# Patient Record
Sex: FEMALE | Race: WHITE | Employment: OTHER | URBAN - METROPOLITAN AREA
[De-identification: names, ages, dates, MRNs, and addresses within clinical notes are randomized per-mention and may not be internally consistent; named-entity substitution may affect disease eponyms.]

---

## 2017-02-13 PROBLEM — N63.10 BREAST MASS, RIGHT: Status: ACTIVE | Noted: 2017-02-13

## 2018-04-17 DIAGNOSIS — Z12.31 VISIT FOR SCREENING MAMMOGRAM: Primary | ICD-10-CM

## 2018-04-30 ENCOUNTER — HOSPITAL ENCOUNTER (OUTPATIENT)
Dept: GENERAL RADIOLOGY | Age: 73
Discharge: HOME | End: 2018-05-02
Payer: MEDICARE

## 2018-04-30 DIAGNOSIS — Z12.31 VISIT FOR SCREENING MAMMOGRAM: ICD-10-CM

## 2018-04-30 PROCEDURE — 77067 SCR MAMMO BI INCL CAD: CPT

## 2019-04-26 LAB
ALBUMIN SERPL-MCNC: NORMAL G/DL
ALP BLD-CCNC: NORMAL U/L
ALT SERPL-CCNC: NORMAL U/L
ANION GAP SERPL CALCULATED.3IONS-SCNC: NORMAL MMOL/L
AST SERPL-CCNC: NORMAL U/L
AVERAGE GLUCOSE: NORMAL
BILIRUB SERPL-MCNC: NORMAL MG/DL
BUN BLDV-MCNC: NORMAL MG/DL
CALCIUM SERPL-MCNC: NORMAL MG/DL
CHLORIDE BLD-SCNC: NORMAL MMOL/L
CHOLESTEROL, TOTAL: NORMAL
CHOLESTEROL/HDL RATIO: NORMAL
CO2: NORMAL
CREAT SERPL-MCNC: NORMAL MG/DL
GFR CALCULATED: NORMAL
GLUCOSE BLD-MCNC: NORMAL MG/DL
HBA1C MFR BLD: 7.6 %
HDLC SERPL-MCNC: NORMAL MG/DL
LDL CHOLESTEROL CALCULATED: NORMAL
POTASSIUM SERPL-SCNC: NORMAL MMOL/L
SODIUM BLD-SCNC: NORMAL MMOL/L
TOTAL PROTEIN: NORMAL
TRIGL SERPL-MCNC: NORMAL MG/DL
VLDLC SERPL CALC-MCNC: NORMAL MG/DL

## 2019-05-29 ENCOUNTER — TELEPHONE (OUTPATIENT)
Dept: FAMILY MEDICINE CLINIC | Age: 74
End: 2019-05-29

## 2019-05-29 ENCOUNTER — TELEPHONE (OUTPATIENT)
Dept: BREAST CENTER | Age: 74
End: 2019-05-29

## 2019-05-29 DIAGNOSIS — M53.86 SCIATICA ASSOCIATED WITH DISORDER OF LUMBAR SPINE: Primary | ICD-10-CM

## 2019-05-29 DIAGNOSIS — Z12.31 VISIT FOR SCREENING MAMMOGRAM: Primary | ICD-10-CM

## 2019-05-29 NOTE — TELEPHONE ENCOUNTER
Patient called to schedule yearly mammogram.  Patient has not seen Dr. Julissa Lamar for 2 years, will send script up for mammogram this year, but encouraged to call PCP for screening mammogram script starting next year. Patient verbalized understanding.

## 2019-05-29 NOTE — TELEPHONE ENCOUNTER
Pt called asking for order for MRI of her back. She would like order sent to [facility]. She said the pain has gotten much worse    Order in EMR. Can you make sure is set up?

## 2019-05-31 NOTE — TELEPHONE ENCOUNTER
Allyson Mohan, would you mind checking to see if central scheduling has set up patients MRI yet? Thank you!

## 2019-06-04 ENCOUNTER — HOSPITAL ENCOUNTER (OUTPATIENT)
Dept: MRI IMAGING | Age: 74
Discharge: HOME | End: 2019-06-06
Payer: MEDICARE

## 2019-06-04 DIAGNOSIS — M53.86 SCIATICA ASSOCIATED WITH DISORDER OF LUMBAR SPINE: ICD-10-CM

## 2019-06-04 PROCEDURE — 6360000004 HC RX CONTRAST MEDICATION: Performed by: RADIOLOGY

## 2019-06-04 PROCEDURE — A9579 GAD-BASE MR CONTRAST NOS,1ML: HCPCS | Performed by: RADIOLOGY

## 2019-06-04 PROCEDURE — 72158 MRI LUMBAR SPINE W/O & W/DYE: CPT

## 2019-06-04 RX ADMIN — GADOTERIDOL 14 ML: 279.3 INJECTION, SOLUTION INTRAVENOUS at 09:57

## 2019-06-07 ENCOUNTER — HOSPITAL ENCOUNTER (OUTPATIENT)
Dept: GENERAL RADIOLOGY | Age: 74
Discharge: HOME | End: 2019-06-09
Payer: MEDICARE

## 2019-06-07 DIAGNOSIS — Z12.31 VISIT FOR SCREENING MAMMOGRAM: ICD-10-CM

## 2019-06-07 PROCEDURE — 77063 BREAST TOMOSYNTHESIS BI: CPT

## 2019-06-07 RX ORDER — ASCORBIC ACID 500 MG
500 TABLET ORAL DAILY
COMMUNITY

## 2019-06-07 RX ORDER — TRAMADOL HYDROCHLORIDE 50 MG/1
50 TABLET ORAL EVERY 12 HOURS
COMMUNITY
End: 2019-09-03 | Stop reason: SDUPTHER

## 2019-06-07 RX ORDER — IBUPROFEN 800 MG/1
800 TABLET ORAL DAILY PRN
COMMUNITY
End: 2020-02-21 | Stop reason: SDUPTHER

## 2019-06-08 PROBLEM — M79.609 PAIN IN LIMB: Status: ACTIVE | Noted: 2017-09-14

## 2019-06-08 PROBLEM — I73.9 PERIPHERAL VASCULAR DISEASE (HCC): Status: ACTIVE | Noted: 2017-09-14

## 2019-06-08 PROBLEM — E11.51 PERIPHERAL VASCULAR DISORDER DUE TO DIABETES MELLITUS (HCC): Status: ACTIVE | Noted: 2017-09-14

## 2019-06-08 PROBLEM — B35.1 ONYCHOMYCOSIS: Status: ACTIVE | Noted: 2017-09-14

## 2019-06-08 PROBLEM — R26.2 DISABILITY OF WALKING: Status: ACTIVE | Noted: 2017-09-14

## 2019-06-10 ENCOUNTER — TELEPHONE (OUTPATIENT)
Dept: ONCOLOGY | Age: 74
End: 2019-06-10

## 2019-06-10 ENCOUNTER — OFFICE VISIT (OUTPATIENT)
Dept: FAMILY MEDICINE CLINIC | Age: 74
End: 2019-06-10
Payer: MEDICARE

## 2019-06-10 VITALS
WEIGHT: 155.2 LBS | HEART RATE: 77 BPM | OXYGEN SATURATION: 96 % | DIASTOLIC BLOOD PRESSURE: 64 MMHG | BODY MASS INDEX: 28.56 KG/M2 | HEIGHT: 62 IN | SYSTOLIC BLOOD PRESSURE: 124 MMHG

## 2019-06-10 DIAGNOSIS — M51.16 LUMBAR DISC DISEASE WITH RADICULOPATHY: ICD-10-CM

## 2019-06-10 DIAGNOSIS — R92.8 MAMMOGRAM ABNORMAL: ICD-10-CM

## 2019-06-10 PROCEDURE — 99213 OFFICE O/P EST LOW 20 MIN: CPT | Performed by: FAMILY MEDICINE

## 2019-06-10 ASSESSMENT — PATIENT HEALTH QUESTIONNAIRE - PHQ9
SUM OF ALL RESPONSES TO PHQ QUESTIONS 1-9: 0
SUM OF ALL RESPONSES TO PHQ QUESTIONS 1-9: 0
2. FEELING DOWN, DEPRESSED OR HOPELESS: 0
1. LITTLE INTEREST OR PLEASURE IN DOING THINGS: 0
SUM OF ALL RESPONSES TO PHQ9 QUESTIONS 1 & 2: 0

## 2019-06-10 NOTE — PROGRESS NOTES
OFFICE NOTE    6/10/19  Name: Genia Santana  :1945   Sex:female   Age:73 y.o. SUBJECTIVE  Chief Complaint   Patient presents with    Results     discuss MRi results        HPI  Came in to discuss recent MRI and plan going forward. Also had abnormal mammogram report form [facility]      Review of Systems   Sciatica LLQ for more than a mos. No lumps, galactorrhea, or pain in breast (s). Current Outpatient Medications:     ibuprofen (ADVIL;MOTRIN) 800 MG tablet, Take 800 mg by mouth daily as needed for Pain, Disp: , Rfl:     traMADol (ULTRAM) 50 MG tablet, Take 50 mg by mouth every 12 hours. , Disp: , Rfl:     vitamin E 600 units capsule, Take 600 Units by mouth daily, Disp: , Rfl:     vitamin C (ASCORBIC ACID) 500 MG tablet, Take 500 mg by mouth daily, Disp: , Rfl:     atorvastatin (LIPITOR) 20 MG tablet, Take 20 mg by mouth daily, Disp: , Rfl:     Multiple Vitamins-Minerals (MULTIVITAMIN PO), Take by mouth, Disp: , Rfl:     Biotin 5000 MCG TABS, Take by mouth, Disp: , Rfl:     Omega-3 Fatty Acids (OMEGA 3 PO), Take by mouth, Disp: , Rfl:     metFORMIN (GLUCOPHAGE) 500 MG tablet, Take 500 mg by mouth 2 times daily (with meals). , Disp: , Rfl:     GLIPIZIDE PO, Take 4 mg by mouth 2 times daily (before meals) , Disp: , Rfl:     pioglitazone (ACTOS) 45 MG tablet, Take 45 mg by mouth daily. , Disp: , Rfl:     enalapril (VASOTEC) 20 MG tablet, Take 20 mg by mouth 2 times daily. , Disp: , Rfl:   No Known Allergies    Past Medical History:   Diagnosis Date    Chronic back pain     Corns and callosities     Diabetes mellitus ([facility] [address]     Hyperlipidemia     Hypertension     Onychomycosis     Peripheral vascular disease (HCC)     Sciatica     Type 2 diabetes mellitus without complication (HCC)      Past Surgical History:   Procedure Laterality Date    BACK SURGERY      BACK SURGERY      3 back surgerys 3-4 year ago    HYSTERECTOMY      TONSILLECTOMY       No family history on file.   Social about 6 months (around 12/10/2019).     Electronically signed by Johnnie Delatorre MD on 6/10/19 at 10:16 AM

## 2019-06-10 NOTE — TELEPHONE ENCOUNTER
Call to patient in reference to her mammogram performed at [facility] on June 7, 2019. Instructed patient that the radiologist has recommended some additional breast imaging, in order to make a final determination/result. Informed her that a request for Rx has been faxed to the ordering physician. Once we receive the script a  from [facility] will contact her to schedule the additional imaging study/studies. Verbalizes understanding and is agreeable to proceed.        Los Pearce RN, BSN, [address]

## 2019-06-13 ENCOUNTER — HOSPITAL ENCOUNTER (OUTPATIENT)
Dept: GENERAL RADIOLOGY | Age: 74
Discharge: HOME | End: 2019-06-15
Payer: MEDICARE

## 2019-06-13 DIAGNOSIS — R92.8 MAMMOGRAM ABNORMAL: ICD-10-CM

## 2019-06-13 PROCEDURE — G0279 TOMOSYNTHESIS, MAMMO: HCPCS

## 2019-06-13 PROCEDURE — 76642 ULTRASOUND BREAST LIMITED: CPT

## 2019-07-02 ENCOUNTER — PROCEDURE VISIT (OUTPATIENT)
Dept: PODIATRY | Age: 74
End: 2019-07-02
Payer: MEDICARE

## 2019-07-02 VITALS — BODY MASS INDEX: 28.89 KG/M2 | HEIGHT: 62 IN | WEIGHT: 157 LBS

## 2019-07-02 DIAGNOSIS — E11.51 TYPE II DIABETES MELLITUS WITH PERIPHERAL CIRCULATORY DISORDER (HCC): ICD-10-CM

## 2019-07-02 DIAGNOSIS — M79.674 PAIN OF TOE OF RIGHT FOOT: ICD-10-CM

## 2019-07-02 DIAGNOSIS — B35.1 ONYCHOMYCOSIS: Primary | ICD-10-CM

## 2019-07-02 DIAGNOSIS — M79.675 PAIN OF TOE OF LEFT FOOT: ICD-10-CM

## 2019-07-02 DIAGNOSIS — I73.9 PVD (PERIPHERAL VASCULAR DISEASE) (HCC): ICD-10-CM

## 2019-07-02 PROCEDURE — 11721 DEBRIDE NAIL 6 OR MORE: CPT | Performed by: PODIATRIST

## 2019-07-30 DIAGNOSIS — E11.9 TYPE 2 DIABETES MELLITUS WITHOUT COMPLICATION, WITHOUT LONG-TERM CURRENT USE OF INSULIN (HCC): Primary | ICD-10-CM

## 2019-07-30 DIAGNOSIS — M15.9 PRIMARY OSTEOARTHRITIS INVOLVING MULTIPLE JOINTS: ICD-10-CM

## 2019-08-26 ASSESSMENT — ENCOUNTER SYMPTOMS
CONSTIPATION: 0
EYES NEGATIVE: 1
ABDOMINAL PAIN: 0
CHEST TIGHTNESS: 0
COUGH: 0
VOMITING: 0
BLOOD IN STOOL: 0
WHEEZING: 0
DIARRHEA: 0

## 2019-09-03 ENCOUNTER — OFFICE VISIT (OUTPATIENT)
Dept: FAMILY MEDICINE CLINIC | Age: 74
End: 2019-09-03
Payer: MEDICARE

## 2019-09-03 VITALS
HEIGHT: 62 IN | SYSTOLIC BLOOD PRESSURE: 136 MMHG | BODY MASS INDEX: 27.53 KG/M2 | WEIGHT: 149.6 LBS | OXYGEN SATURATION: 98 % | HEART RATE: 79 BPM | DIASTOLIC BLOOD PRESSURE: 70 MMHG

## 2019-09-03 DIAGNOSIS — E11.51 TYPE II DIABETES MELLITUS WITH PERIPHERAL CIRCULATORY DISORDER (HCC): ICD-10-CM

## 2019-09-03 DIAGNOSIS — M53.86 SCIATICA ASSOCIATED WITH DISORDER OF LUMBAR SPINE: ICD-10-CM

## 2019-09-03 DIAGNOSIS — E78.49 OTHER HYPERLIPIDEMIA: ICD-10-CM

## 2019-09-03 DIAGNOSIS — I10 ESSENTIAL HYPERTENSION: ICD-10-CM

## 2019-09-03 DIAGNOSIS — I73.9 PERIPHERAL VASCULAR DISEASE (HCC): ICD-10-CM

## 2019-09-03 DIAGNOSIS — M15.9 PRIMARY OSTEOARTHRITIS INVOLVING MULTIPLE JOINTS: ICD-10-CM

## 2019-09-03 DIAGNOSIS — E11.9 TYPE 2 DIABETES MELLITUS WITHOUT COMPLICATION, WITHOUT LONG-TERM CURRENT USE OF INSULIN (HCC): Primary | ICD-10-CM

## 2019-09-03 DIAGNOSIS — M51.16 LUMBAR DISC DISEASE WITH RADICULOPATHY: ICD-10-CM

## 2019-09-03 PROBLEM — E78.5 HYPERLIPIDEMIA: Status: ACTIVE | Noted: 2019-09-03

## 2019-09-03 LAB — HBA1C MFR BLD: 8 %

## 2019-09-03 PROCEDURE — 99214 OFFICE O/P EST MOD 30 MIN: CPT | Performed by: FAMILY MEDICINE

## 2019-09-03 PROCEDURE — 83036 HEMOGLOBIN GLYCOSYLATED A1C: CPT | Performed by: FAMILY MEDICINE

## 2019-09-03 RX ORDER — GLIPIZIDE 5 MG/1
2 TABLET ORAL 2 TIMES DAILY
COMMUNITY

## 2019-09-03 RX ORDER — TRAMADOL HYDROCHLORIDE 50 MG/1
50 TABLET ORAL EVERY 12 HOURS
Qty: 30 TABLET | Refills: 2 | Status: SHIPPED | OUTPATIENT
Start: 2019-09-03 | End: 2019-12-02

## 2019-10-01 ENCOUNTER — PROCEDURE VISIT (OUTPATIENT)
Dept: PODIATRY | Age: 74
End: 2019-10-01
Payer: MEDICARE

## 2019-10-01 VITALS — RESPIRATION RATE: 20 BRPM | WEIGHT: 149 LBS | BODY MASS INDEX: 27.42 KG/M2 | HEIGHT: 62 IN

## 2019-10-01 DIAGNOSIS — M79.675 PAIN OF TOE OF LEFT FOOT: ICD-10-CM

## 2019-10-01 DIAGNOSIS — B35.1 ONYCHOMYCOSIS: Primary | ICD-10-CM

## 2019-10-01 DIAGNOSIS — E11.51 TYPE II DIABETES MELLITUS WITH PERIPHERAL CIRCULATORY DISORDER (HCC): ICD-10-CM

## 2019-10-01 DIAGNOSIS — I73.9 PVD (PERIPHERAL VASCULAR DISEASE) (HCC): ICD-10-CM

## 2019-10-01 DIAGNOSIS — M79.674 PAIN OF TOE OF RIGHT FOOT: ICD-10-CM

## 2019-10-01 PROCEDURE — 11721 DEBRIDE NAIL 6 OR MORE: CPT | Performed by: PODIATRIST

## 2019-11-19 LAB — DIABETIC RETINOPATHY: NEGATIVE

## 2019-12-03 ENCOUNTER — OFFICE VISIT (OUTPATIENT)
Dept: FAMILY MEDICINE CLINIC | Age: 74
End: 2019-12-03
Payer: MEDICARE

## 2019-12-03 VITALS
BODY MASS INDEX: 26.35 KG/M2 | SYSTOLIC BLOOD PRESSURE: 132 MMHG | TEMPERATURE: 97.6 F | DIASTOLIC BLOOD PRESSURE: 70 MMHG | HEART RATE: 75 BPM | WEIGHT: 143.2 LBS | OXYGEN SATURATION: 97 % | HEIGHT: 62 IN

## 2019-12-03 DIAGNOSIS — Z23 IMMUNIZATION DUE: Primary | ICD-10-CM

## 2019-12-03 DIAGNOSIS — E11.59 TYPE 2 DIABETES MELLITUS WITH OTHER CIRCULATORY COMPLICATION, WITHOUT LONG-TERM CURRENT USE OF INSULIN (HCC): ICD-10-CM

## 2019-12-03 DIAGNOSIS — E78.49 OTHER HYPERLIPIDEMIA: ICD-10-CM

## 2019-12-03 DIAGNOSIS — I10 ESSENTIAL HYPERTENSION: ICD-10-CM

## 2019-12-03 DIAGNOSIS — N63.10 BREAST MASS, RIGHT: ICD-10-CM

## 2019-12-03 PROBLEM — I73.9 PERIPHERAL VASCULAR DISEASE (HCC): Status: RESOLVED | Noted: 2017-09-14 | Resolved: 2019-12-03

## 2019-12-03 PROBLEM — E11.51 PERIPHERAL VASCULAR DISORDER DUE TO DIABETES MELLITUS (HCC): Status: RESOLVED | Noted: 2017-09-14 | Resolved: 2019-12-03

## 2019-12-03 PROBLEM — I73.9 PVD (PERIPHERAL VASCULAR DISEASE) (HCC): Status: RESOLVED | Noted: 2019-07-02 | Resolved: 2019-12-03

## 2019-12-03 LAB
CREATININE URINE POCT: ABNORMAL
HBA1C MFR BLD: 7.1 %
MICROALBUMIN/CREAT 24H UR: ABNORMAL MG/G{CREAT}
MICROALBUMIN/CREAT UR-RTO: ABNORMAL

## 2019-12-03 PROCEDURE — 83036 HEMOGLOBIN GLYCOSYLATED A1C: CPT | Performed by: FAMILY MEDICINE

## 2019-12-03 PROCEDURE — G8400 PT W/DXA NO RESULTS DOC: HCPCS | Performed by: FAMILY MEDICINE

## 2019-12-03 PROCEDURE — 3051F HG A1C>EQUAL 7.0%<8.0%: CPT | Performed by: FAMILY MEDICINE

## 2019-12-03 PROCEDURE — 90732 PPSV23 VACC 2 YRS+ SUBQ/IM: CPT | Performed by: FAMILY MEDICINE

## 2019-12-03 PROCEDURE — 82044 UR ALBUMIN SEMIQUANTITATIVE: CPT | Performed by: FAMILY MEDICINE

## 2019-12-03 PROCEDURE — 1036F TOBACCO NON-USER: CPT | Performed by: FAMILY MEDICINE

## 2019-12-03 PROCEDURE — G8482 FLU IMMUNIZE ORDER/ADMIN: HCPCS | Performed by: FAMILY MEDICINE

## 2019-12-03 PROCEDURE — 1090F PRES/ABSN URINE INCON ASSESS: CPT | Performed by: FAMILY MEDICINE

## 2019-12-03 PROCEDURE — 1123F ACP DISCUSS/DSCN MKR DOCD: CPT | Performed by: FAMILY MEDICINE

## 2019-12-03 PROCEDURE — 99214 OFFICE O/P EST MOD 30 MIN: CPT | Performed by: FAMILY MEDICINE

## 2019-12-03 PROCEDURE — 93000 ELECTROCARDIOGRAM COMPLETE: CPT | Performed by: FAMILY MEDICINE

## 2019-12-03 PROCEDURE — G0009 ADMIN PNEUMOCOCCAL VACCINE: HCPCS | Performed by: FAMILY MEDICINE

## 2019-12-03 PROCEDURE — G0008 ADMIN INFLUENZA VIRUS VAC: HCPCS | Performed by: FAMILY MEDICINE

## 2019-12-03 PROCEDURE — 2022F DILAT RTA XM EVC RTNOPTHY: CPT | Performed by: FAMILY MEDICINE

## 2019-12-03 PROCEDURE — G8417 CALC BMI ABV UP PARAM F/U: HCPCS | Performed by: FAMILY MEDICINE

## 2019-12-03 PROCEDURE — 4040F PNEUMOC VAC/ADMIN/RCVD: CPT | Performed by: FAMILY MEDICINE

## 2019-12-03 PROCEDURE — 90653 IIV ADJUVANT VACCINE IM: CPT | Performed by: FAMILY MEDICINE

## 2019-12-03 PROCEDURE — G8427 DOCREV CUR MEDS BY ELIG CLIN: HCPCS | Performed by: FAMILY MEDICINE

## 2019-12-03 PROCEDURE — 3017F COLORECTAL CA SCREEN DOC REV: CPT | Performed by: FAMILY MEDICINE

## 2019-12-03 ASSESSMENT — ENCOUNTER SYMPTOMS
BLOOD IN STOOL: 0
VOMITING: 0
CONSTIPATION: 0
DIARRHEA: 0
EYES NEGATIVE: 1
WHEEZING: 0
ABDOMINAL PAIN: 0
CHEST TIGHTNESS: 0
COUGH: 0

## 2019-12-10 ENCOUNTER — PROCEDURE VISIT (OUTPATIENT)
Dept: PODIATRY | Age: 74
End: 2019-12-10
Payer: MEDICARE

## 2019-12-10 VITALS — BODY MASS INDEX: 26.31 KG/M2 | HEIGHT: 62 IN | WEIGHT: 143 LBS

## 2019-12-10 DIAGNOSIS — B35.1 ONYCHOMYCOSIS: Primary | ICD-10-CM

## 2019-12-10 DIAGNOSIS — M79.675 PAIN OF TOE OF LEFT FOOT: ICD-10-CM

## 2019-12-10 DIAGNOSIS — E11.51 TYPE II DIABETES MELLITUS WITH PERIPHERAL CIRCULATORY DISORDER (HCC): ICD-10-CM

## 2019-12-10 DIAGNOSIS — R26.2 DISABILITY OF WALKING: ICD-10-CM

## 2019-12-10 DIAGNOSIS — M79.674 PAIN OF TOE OF RIGHT FOOT: ICD-10-CM

## 2019-12-10 PROCEDURE — 11721 DEBRIDE NAIL 6 OR MORE: CPT | Performed by: PODIATRIST

## 2019-12-16 ENCOUNTER — HOSPITAL ENCOUNTER (OUTPATIENT)
Dept: GENERAL RADIOLOGY | Age: 74
Discharge: HOME | End: 2019-12-18
Payer: MEDICARE

## 2019-12-16 DIAGNOSIS — R92.8 BI-RADS CATEGORY 3 MAMMOGRAM RESULT: ICD-10-CM

## 2019-12-16 PROCEDURE — 76642 ULTRASOUND BREAST LIMITED: CPT

## 2020-02-21 RX ORDER — IBUPROFEN 800 MG/1
800 TABLET ORAL DAILY PRN
Qty: 60 TABLET | Refills: 1 | Status: SHIPPED
Start: 2020-02-21 | End: 2020-07-13 | Stop reason: SDUPTHER

## 2020-03-04 ENCOUNTER — OFFICE VISIT (OUTPATIENT)
Dept: FAMILY MEDICINE CLINIC | Age: 75
End: 2020-03-04
Payer: MEDICARE

## 2020-03-04 VITALS
DIASTOLIC BLOOD PRESSURE: 64 MMHG | WEIGHT: 136.4 LBS | BODY MASS INDEX: 24.95 KG/M2 | SYSTOLIC BLOOD PRESSURE: 122 MMHG | TEMPERATURE: 97.4 F | OXYGEN SATURATION: 99 % | HEART RATE: 72 BPM

## 2020-03-04 PROBLEM — N63.10 BREAST MASS, RIGHT: Status: RESOLVED | Noted: 2017-02-13 | Resolved: 2020-03-04

## 2020-03-04 PROCEDURE — 1123F ACP DISCUSS/DSCN MKR DOCD: CPT | Performed by: FAMILY MEDICINE

## 2020-03-04 PROCEDURE — 1036F TOBACCO NON-USER: CPT | Performed by: FAMILY MEDICINE

## 2020-03-04 PROCEDURE — 1090F PRES/ABSN URINE INCON ASSESS: CPT | Performed by: FAMILY MEDICINE

## 2020-03-04 PROCEDURE — 99213 OFFICE O/P EST LOW 20 MIN: CPT | Performed by: FAMILY MEDICINE

## 2020-03-04 PROCEDURE — 3046F HEMOGLOBIN A1C LEVEL >9.0%: CPT | Performed by: FAMILY MEDICINE

## 2020-03-04 PROCEDURE — 2022F DILAT RTA XM EVC RTNOPTHY: CPT | Performed by: FAMILY MEDICINE

## 2020-03-04 PROCEDURE — G8482 FLU IMMUNIZE ORDER/ADMIN: HCPCS | Performed by: FAMILY MEDICINE

## 2020-03-04 PROCEDURE — G8400 PT W/DXA NO RESULTS DOC: HCPCS | Performed by: FAMILY MEDICINE

## 2020-03-04 PROCEDURE — 4040F PNEUMOC VAC/ADMIN/RCVD: CPT | Performed by: FAMILY MEDICINE

## 2020-03-04 PROCEDURE — G8420 CALC BMI NORM PARAMETERS: HCPCS | Performed by: FAMILY MEDICINE

## 2020-03-04 PROCEDURE — G8427 DOCREV CUR MEDS BY ELIG CLIN: HCPCS | Performed by: FAMILY MEDICINE

## 2020-03-04 PROCEDURE — 3017F COLORECTAL CA SCREEN DOC REV: CPT | Performed by: FAMILY MEDICINE

## 2020-03-04 RX ORDER — TRAMADOL HYDROCHLORIDE 50 MG/1
1 TABLET ORAL 2 TIMES DAILY
COMMUNITY
Start: 2020-01-20 | End: 2020-03-04 | Stop reason: SDUPTHER

## 2020-03-04 RX ORDER — TRAMADOL HYDROCHLORIDE 50 MG/1
50 TABLET ORAL 2 TIMES DAILY
Qty: 60 TABLET | Refills: 2 | Status: SHIPPED | OUTPATIENT
Start: 2020-03-04 | End: 2020-06-02

## 2020-03-04 ASSESSMENT — PATIENT HEALTH QUESTIONNAIRE - PHQ9
SUM OF ALL RESPONSES TO PHQ QUESTIONS 1-9: 0
SUM OF ALL RESPONSES TO PHQ QUESTIONS 1-9: 0
2. FEELING DOWN, DEPRESSED OR HOPELESS: 0
SUM OF ALL RESPONSES TO PHQ9 QUESTIONS 1 & 2: 0
1. LITTLE INTEREST OR PLEASURE IN DOING THINGS: 0

## 2020-03-04 ASSESSMENT — ENCOUNTER SYMPTOMS
COUGH: 0
CONSTIPATION: 0
DIARRHEA: 0
VOMITING: 0
ABDOMINAL PAIN: 0
EYES NEGATIVE: 1
CHEST TIGHTNESS: 0
BLOOD IN STOOL: 0
WHEEZING: 0

## 2020-03-04 NOTE — PROGRESS NOTES
glipiZIDE (GLUCOTROL) 5 MG tablet, Take 2 tablets by mouth 2 times daily, Disp: , Rfl:     metFORMIN (GLUCOPHAGE) 500 MG tablet, Take 2 tablets by mouth 2 times daily, Disp: , Rfl:     vitamin E 600 units capsule, Take 600 Units by mouth daily, Disp: , Rfl:     vitamin C (ASCORBIC ACID) 500 MG tablet, Take 500 mg by mouth daily, Disp: , Rfl:     Multiple Vitamins-Minerals (MULTIVITAMIN PO), Take by mouth, Disp: , Rfl:     Biotin 5000 MCG TABS, Take by mouth, Disp: , Rfl:     Omega-3 Fatty Acids (OMEGA 3 PO), Take by mouth, Disp: , Rfl:     pioglitazone (ACTOS) 45 MG tablet, Take 45 mg by mouth daily. , Disp: , Rfl:     enalapril (VASOTEC) 20 MG tablet, Take 20 mg by mouth 2 times daily. , Disp: , Rfl:   No Known Allergies    Past Medical History:   Diagnosis Date    Chronic back pain     Corns and callosities     Diabetes mellitus ([facility] [address]     Hyperlipidemia     Hypertension     Onychomycosis     Peripheral vascular disease (HCC)     Sciatica     Type 2 diabetes mellitus without complication (HCC)      Past Surgical History:   Procedure Laterality Date    BACK SURGERY      BACK SURGERY      3 back surgerys 3-4 year ago    HYSTERECTOMY      TONSILLECTOMY       No family history on file. Social History     Tobacco History     Smoking Status  Never Smoker    Smokeless Tobacco Use  Never Used          Alcohol History     Alcohol Use Status  No          Drug Use     Drug Use Status  No          Sexual Activity     Sexually Active  Not Currently Partners  Male Birth Control/Protection  Post-menopausal              OBJECTIVE  Vitals:    03/04/20 0838   BP: 122/64   Site: Right Upper Arm   Position: Sitting   Pulse: 72   Temp: 97.4 °F (36.3 °C)   TempSrc: Temporal   SpO2: 99%   Weight: 136 lb 6.4 oz (61.9 kg)       Body mass index is 24.95 kg/m². No orders of the defined types were placed in this encounter. EXAM   Physical Exam  Vitals signs and nursing note reviewed.    Constitutional: Appearance: Normal appearance. She is well-developed and normal weight. HENT:      Right Ear: Tympanic membrane, ear canal and external ear normal.      Left Ear: Tympanic membrane, ear canal and external ear normal.      Nose: Nose normal.      Mouth/Throat:      Pharynx: Oropharynx is clear. No oropharyngeal exudate. Eyes:      General: No scleral icterus. Conjunctiva/sclera: Conjunctivae normal.      Pupils: Pupils are equal, round, and reactive to light. Neck:      Musculoskeletal: Normal range of motion and neck supple. Thyroid: No thyroid mass or thyromegaly. Vascular: No carotid bruit or JVD. Trachea: Trachea normal.   Cardiovascular:      Rate and Rhythm: Normal rate and regular rhythm. Pulses: Normal pulses. Heart sounds: Normal heart sounds. No murmur. No gallop. Pulmonary:      Effort: Pulmonary effort is normal.      Breath sounds: Normal breath sounds. No wheezing, rhonchi or rales. Abdominal:      General: Bowel sounds are normal. There is no distension. Palpations: Abdomen is soft. There is no mass. Tenderness: There is no abdominal tenderness. There is no guarding. Musculoskeletal: Normal range of motion. General: No swelling or tenderness. Right lower leg: No edema. Left lower leg: No edema. Lymphadenopathy:      Cervical: No cervical adenopathy. Skin:     General: Skin is warm and dry. Coloration: Skin is not jaundiced. Findings: No bruising or rash. Neurological:      General: No focal deficit present. Mental Status: She is alert and oriented to person, place, and time. Motor: No abnormal muscle tone. Psychiatric:         Mood and Affect: Mood normal.         Behavior: Behavior normal.           ASSESSMENT/PLAN:  Patrizia Cason was seen today for back pain and stress. Diagnoses and all orders for this visit:    Lumbar disc disease with radiculopathy  -     traMADol (ULTRAM) 50 MG tablet;  Take 1 tablet by mouth 2 times daily for 90 days. OARRS reviewed. Takes 1 at hs on most days. Will renew    Type II diabetes mellitus with peripheral circulatory disorder ([facility] [address] Labs current, A1C well controlled, no changes made    Hypertension--well controlled no changes made    Mammogram abnormal. U/s suggested benign ectasia left breast, repeat mammogram in 6 mos recommended         Return in about 3 months (around 6/4/2020). Electronically signed by Myrna Jang MD on 3/4/20 at 9:23 AM    I have personally reviewed and updated the chief complaint, HPI, Past Medical, Family and Social History, as well as the above Review of Systems.

## 2020-03-09 ENCOUNTER — OFFICE VISIT (OUTPATIENT)
Dept: FAMILY MEDICINE CLINIC | Age: 75
End: 2020-03-09
Payer: MEDICARE

## 2020-03-09 VITALS
OXYGEN SATURATION: 98 % | DIASTOLIC BLOOD PRESSURE: 62 MMHG | HEART RATE: 86 BPM | TEMPERATURE: 98.3 F | SYSTOLIC BLOOD PRESSURE: 128 MMHG

## 2020-03-09 PROCEDURE — 99213 OFFICE O/P EST LOW 20 MIN: CPT | Performed by: PHYSICIAN ASSISTANT

## 2020-03-09 RX ORDER — CEFUROXIME AXETIL 250 MG/1
250 TABLET ORAL 2 TIMES DAILY
Qty: 20 TABLET | Refills: 0 | Status: SHIPPED | OUTPATIENT
Start: 2020-03-09 | End: 2020-03-19

## 2020-03-09 RX ORDER — METHYLPREDNISOLONE 4 MG/1
TABLET ORAL
Qty: 1 KIT | Refills: 0 | Status: SHIPPED
Start: 2020-03-09 | End: 2020-10-14

## 2020-03-09 ASSESSMENT — ENCOUNTER SYMPTOMS
GASTROINTESTINAL NEGATIVE: 1
CHOKING: 0
SHORTNESS OF BREATH: 0
SORE THROAT: 0
SINUS PAIN: 1
EYES NEGATIVE: 1
SINUS PRESSURE: 1
CHEST TIGHTNESS: 0
WHEEZING: 0
STRIDOR: 0
COUGH: 1
APNEA: 0

## 2020-03-10 ENCOUNTER — PROCEDURE VISIT (OUTPATIENT)
Dept: PODIATRY | Age: 75
End: 2020-03-10
Payer: MEDICARE

## 2020-03-10 VITALS
DIASTOLIC BLOOD PRESSURE: 80 MMHG | HEART RATE: 72 BPM | TEMPERATURE: 98.4 F | BODY MASS INDEX: 25.03 KG/M2 | OXYGEN SATURATION: 96 % | HEIGHT: 62 IN | SYSTOLIC BLOOD PRESSURE: 124 MMHG | WEIGHT: 136 LBS

## 2020-03-10 PROCEDURE — 11721 DEBRIDE NAIL 6 OR MORE: CPT | Performed by: PODIATRIST

## 2020-03-10 NOTE — PROGRESS NOTES
Patient in today for nail care. Patient does not have any complaints of pain at this time.  Patient's PCP is Iva Barger MD date of last ov   3-4-20      Crystal Mir LPN

## 2020-03-10 NOTE — PROGRESS NOTES
3/10/20     Gabrielle Thao    : 1945  Sex: female  Age: 69 y.o. Subjective: The patient is seen today for evaluation regarding diabetic foot evaluation and mycotic nail care. No other complaints noted. Chief Complaint   Patient presents with    Nail Problem     nail care       Current Medications:    Current Outpatient Medications:     cefUROXime (CEFTIN) 250 MG tablet, Take 1 tablet by mouth 2 times daily for 10 days, Disp: 20 tablet, Rfl: 0    methylPREDNISolone (MEDROL DOSEPACK) 4 MG tablet, Take by mouth., Disp: 1 kit, Rfl: 0    traMADol (ULTRAM) 50 MG tablet, Take 1 tablet by mouth 2 times daily for 90 days. , Disp: 60 tablet, Rfl: 2    ibuprofen (ADVIL;MOTRIN) 800 MG tablet, Take 1 tablet by mouth daily as needed for Pain, Disp: 60 tablet, Rfl: 1    glipiZIDE (GLUCOTROL) 5 MG tablet, Take 2 tablets by mouth 2 times daily, Disp: , Rfl:     metFORMIN (GLUCOPHAGE) 500 MG tablet, Take 2 tablets by mouth 2 times daily, Disp: , Rfl:     vitamin E 600 units capsule, Take 600 Units by mouth daily, Disp: , Rfl:     vitamin C (ASCORBIC ACID) 500 MG tablet, Take 500 mg by mouth daily, Disp: , Rfl:     Multiple Vitamins-Minerals (MULTIVITAMIN PO), Take by mouth, Disp: , Rfl:     Biotin 5000 MCG TABS, Take by mouth, Disp: , Rfl:     Omega-3 Fatty Acids (OMEGA 3 PO), Take by mouth, Disp: , Rfl:     pioglitazone (ACTOS) 45 MG tablet, Take 45 mg by mouth daily. , Disp: , Rfl:     enalapril (VASOTEC) 20 MG tablet, Take 20 mg by mouth 2 times daily. , Disp: , Rfl:     Allergies:  No Known Allergies    Past Surgical History:   Procedure Laterality Date    BACK SURGERY      BACK SURGERY      3 back surgerys 3-4 year ago    HYSTERECTOMY      TONSILLECTOMY       Past Medical History:   Diagnosis Date    Chronic back pain     Corns and callosities     Diabetes mellitus ([facility] [address]     Hyperlipidemia     Hypertension     Onychomycosis     Peripheral vascular disease (HCC)     Sciatica     Type 2 5. We will see the patient back at a later date for continued podiatric management and care. Patient was advised to call the office with any questions or concerns prior to their next appointment if needed. Seen By:    Bentley Goodpasture, DPM    Electronically signed by Bentley Goodpasture, DPM on 3/10/2020 at 8:53 AM      This note was created using voice recognition software. The note was reviewed however may contain grammatical errors.

## 2020-06-02 ENCOUNTER — PROCEDURE VISIT (OUTPATIENT)
Dept: PODIATRY | Age: 75
End: 2020-06-02
Payer: MEDICARE

## 2020-06-02 VITALS
HEIGHT: 62 IN | TEMPERATURE: 97.4 F | BODY MASS INDEX: 24.48 KG/M2 | WEIGHT: 133 LBS | HEART RATE: 71 BPM | OXYGEN SATURATION: 98 %

## 2020-06-02 PROCEDURE — 11721 DEBRIDE NAIL 6 OR MORE: CPT | Performed by: PODIATRIST

## 2020-06-02 NOTE — PROGRESS NOTES
Patient in today for nail care. Patient does not have any complaints of pain at this time.  Patient's PCP is Jaquelin Cast MD date of last ov 3-4-2020        Hortensia Reyes LPN

## 2020-06-02 NOTE — PROGRESS NOTES
20     Myriam Cano    : 1945  Sex: female  Age: 69 y.o. Subjective: The patient is seen today for evaluation regarding diabetic foot evaluation and mycotic nail care. No other complaints noted. Chief Complaint   Patient presents with    Nail Problem     nail care       Current Medications:    Current Outpatient Medications:     methylPREDNISolone (MEDROL DOSEPACK) 4 MG tablet, Take by mouth., Disp: 1 kit, Rfl: 0    traMADol (ULTRAM) 50 MG tablet, Take 1 tablet by mouth 2 times daily for 90 days. , Disp: 60 tablet, Rfl: 2    ibuprofen (ADVIL;MOTRIN) 800 MG tablet, Take 1 tablet by mouth daily as needed for Pain, Disp: 60 tablet, Rfl: 1    glipiZIDE (GLUCOTROL) 5 MG tablet, Take 2 tablets by mouth 2 times daily, Disp: , Rfl:     metFORMIN (GLUCOPHAGE) 500 MG tablet, Take 2 tablets by mouth 2 times daily, Disp: , Rfl:     vitamin E 600 units capsule, Take 600 Units by mouth daily, Disp: , Rfl:     vitamin C (ASCORBIC ACID) 500 MG tablet, Take 500 mg by mouth daily, Disp: , Rfl:     Multiple Vitamins-Minerals (MULTIVITAMIN PO), Take by mouth, Disp: , Rfl:     Biotin 5000 MCG TABS, Take by mouth, Disp: , Rfl:     Omega-3 Fatty Acids (OMEGA 3 PO), Take by mouth, Disp: , Rfl:     pioglitazone (ACTOS) 45 MG tablet, Take 45 mg by mouth daily. , Disp: , Rfl:     enalapril (VASOTEC) 20 MG tablet, Take 20 mg by mouth 2 times daily. , Disp: , Rfl:     Allergies:  No Known Allergies    Past Surgical History:   Procedure Laterality Date    BACK SURGERY      BACK SURGERY      3 back surgerys 3-4 year ago    HYSTERECTOMY      TONSILLECTOMY       Past Medical History:   Diagnosis Date    Chronic back pain     Corns and callosities     Diabetes mellitus ([facility] [address]     Hyperlipidemia     Hypertension     Onychomycosis     Peripheral vascular disease (HCC)     Sciatica     Type 2 diabetes mellitus without complication (HCC)        Vitals:    20 0837   Pulse: 71   Temp: 97.4 °F (36.3 °C)

## 2020-07-13 ENCOUNTER — OFFICE VISIT (OUTPATIENT)
Dept: FAMILY MEDICINE CLINIC | Age: 75
End: 2020-07-13
Payer: MEDICARE

## 2020-07-13 ENCOUNTER — HOSPITAL ENCOUNTER (OUTPATIENT)
Age: 75
Discharge: HOME | End: 2020-07-15
Payer: MEDICARE

## 2020-07-13 VITALS
HEART RATE: 62 BPM | SYSTOLIC BLOOD PRESSURE: 140 MMHG | RESPIRATION RATE: 16 BRPM | BODY MASS INDEX: 24.22 KG/M2 | WEIGHT: 132.4 LBS | DIASTOLIC BLOOD PRESSURE: 60 MMHG | OXYGEN SATURATION: 95 % | TEMPERATURE: 98.2 F

## 2020-07-13 LAB
ALBUMIN SERPL-MCNC: 4 G/DL (ref 3.5–5.2)
ALP BLD-CCNC: 59 U/L (ref 35–104)
ALT SERPL-CCNC: 13 U/L (ref 0–32)
ANION GAP SERPL CALCULATED.3IONS-SCNC: 10 MMOL/L (ref 7–16)
AST SERPL-CCNC: 15 U/L (ref 0–31)
BACTERIA: ABNORMAL /HPF
BASOPHILS ABSOLUTE: 0.05 E9/L (ref 0–0.2)
BASOPHILS RELATIVE PERCENT: 1.1 % (ref 0–2)
BILIRUB SERPL-MCNC: 0.3 MG/DL (ref 0–1.2)
BILIRUBIN URINE: NEGATIVE
BLOOD, URINE: NEGATIVE
BUN BLDV-MCNC: 28 MG/DL (ref 8–23)
CALCIUM SERPL-MCNC: 9.5 MG/DL (ref 8.6–10.2)
CHLORIDE BLD-SCNC: 106 MMOL/L (ref 98–107)
CHOLESTEROL, TOTAL: 208 MG/DL (ref 0–199)
CLARITY: ABNORMAL
CO2: 21 MMOL/L (ref 22–29)
COLOR: YELLOW
CREAT SERPL-MCNC: 1.1 MG/DL (ref 0.5–1)
CREATININE URINE: 131 MG/DL (ref 29–226)
EOSINOPHILS ABSOLUTE: 0.15 E9/L (ref 0.05–0.5)
EOSINOPHILS RELATIVE PERCENT: 3.4 % (ref 0–6)
GFR AFRICAN AMERICAN: 59
GFR NON-AFRICAN AMERICAN: 49 ML/MIN/1.73
GLUCOSE BLD-MCNC: 136 MG/DL (ref 74–99)
GLUCOSE URINE: NEGATIVE MG/DL
HBA1C MFR BLD: 9.7 % (ref 4–5.6)
HCT VFR BLD CALC: 38.7 % (ref 34–48)
HDLC SERPL-MCNC: 64 MG/DL
HEMOGLOBIN: 12.5 G/DL (ref 11.5–15.5)
IMMATURE GRANULOCYTES #: 0.02 E9/L
IMMATURE GRANULOCYTES %: 0.4 % (ref 0–5)
KETONES, URINE: NEGATIVE MG/DL
LDL CHOLESTEROL CALCULATED: 122 MG/DL (ref 0–99)
LEUKOCYTE ESTERASE, URINE: ABNORMAL
LYMPHOCYTES ABSOLUTE: 1.16 E9/L (ref 1.5–4)
LYMPHOCYTES RELATIVE PERCENT: 26.1 % (ref 20–42)
MCH RBC QN AUTO: 31.6 PG (ref 26–35)
MCHC RBC AUTO-ENTMCNC: 32.3 % (ref 32–34.5)
MCV RBC AUTO: 98 FL (ref 80–99.9)
MICROALBUMIN UR-MCNC: 37.6 MG/L
MICROALBUMIN/CREAT UR-RTO: 28.7 (ref 0–30)
MONOCYTES ABSOLUTE: 0.37 E9/L (ref 0.1–0.95)
MONOCYTES RELATIVE PERCENT: 8.3 % (ref 2–12)
NEUTROPHILS ABSOLUTE: 2.7 E9/L (ref 1.8–7.3)
NEUTROPHILS RELATIVE PERCENT: 60.7 % (ref 43–80)
NITRITE, URINE: POSITIVE
PDW BLD-RTO: 12.3 FL (ref 11.5–15)
PH UA: 5.5 (ref 5–9)
PLATELET # BLD: 247 E9/L (ref 130–450)
PMV BLD AUTO: 10.3 FL (ref 7–12)
POTASSIUM SERPL-SCNC: 4 MMOL/L (ref 3.5–5)
PROTEIN UA: NEGATIVE MG/DL
RBC # BLD: 3.95 E12/L (ref 3.5–5.5)
RBC UA: ABNORMAL /HPF (ref 0–2)
SODIUM BLD-SCNC: 137 MMOL/L (ref 132–146)
SPECIFIC GRAVITY UA: 1.02 (ref 1–1.03)
TOTAL PROTEIN: 6.4 G/DL (ref 6.4–8.3)
TRIGL SERPL-MCNC: 112 MG/DL (ref 0–149)
TSH SERPL DL<=0.05 MIU/L-ACNC: 1.73 UIU/ML (ref 0.27–4.2)
UROBILINOGEN, URINE: 0.2 E.U./DL
VLDLC SERPL CALC-MCNC: 22 MG/DL
WBC # BLD: 4.5 E9/L (ref 4.5–11.5)
WBC UA: >20 /HPF (ref 0–5)

## 2020-07-13 PROCEDURE — 3017F COLORECTAL CA SCREEN DOC REV: CPT | Performed by: FAMILY MEDICINE

## 2020-07-13 PROCEDURE — 1123F ACP DISCUSS/DSCN MKR DOCD: CPT | Performed by: FAMILY MEDICINE

## 2020-07-13 PROCEDURE — 81003 URINALYSIS AUTO W/O SCOPE: CPT | Performed by: FAMILY MEDICINE

## 2020-07-13 PROCEDURE — 80061 LIPID PANEL: CPT

## 2020-07-13 PROCEDURE — G8427 DOCREV CUR MEDS BY ELIG CLIN: HCPCS | Performed by: FAMILY MEDICINE

## 2020-07-13 PROCEDURE — 99214 OFFICE O/P EST MOD 30 MIN: CPT | Performed by: FAMILY MEDICINE

## 2020-07-13 PROCEDURE — 3046F HEMOGLOBIN A1C LEVEL >9.0%: CPT | Performed by: FAMILY MEDICINE

## 2020-07-13 PROCEDURE — 1036F TOBACCO NON-USER: CPT | Performed by: FAMILY MEDICINE

## 2020-07-13 PROCEDURE — 84443 ASSAY THYROID STIM HORMONE: CPT

## 2020-07-13 PROCEDURE — 82570 ASSAY OF URINE CREATININE: CPT

## 2020-07-13 PROCEDURE — 36415 COLL VENOUS BLD VENIPUNCTURE: CPT

## 2020-07-13 PROCEDURE — 82044 UR ALBUMIN SEMIQUANTITATIVE: CPT

## 2020-07-13 PROCEDURE — G8420 CALC BMI NORM PARAMETERS: HCPCS | Performed by: FAMILY MEDICINE

## 2020-07-13 PROCEDURE — 81001 URINALYSIS AUTO W/SCOPE: CPT

## 2020-07-13 PROCEDURE — 83036 HEMOGLOBIN GLYCOSYLATED A1C: CPT

## 2020-07-13 PROCEDURE — 4040F PNEUMOC VAC/ADMIN/RCVD: CPT | Performed by: FAMILY MEDICINE

## 2020-07-13 PROCEDURE — 1090F PRES/ABSN URINE INCON ASSESS: CPT | Performed by: FAMILY MEDICINE

## 2020-07-13 PROCEDURE — G8400 PT W/DXA NO RESULTS DOC: HCPCS | Performed by: FAMILY MEDICINE

## 2020-07-13 PROCEDURE — 85025 COMPLETE CBC W/AUTO DIFF WBC: CPT

## 2020-07-13 PROCEDURE — 2022F DILAT RTA XM EVC RTNOPTHY: CPT | Performed by: FAMILY MEDICINE

## 2020-07-13 PROCEDURE — 80053 COMPREHEN METABOLIC PANEL: CPT

## 2020-07-13 RX ORDER — IBUPROFEN 800 MG/1
800 TABLET ORAL DAILY PRN
Qty: 60 TABLET | Refills: 5 | Status: SHIPPED
Start: 2020-07-13 | End: 2021-01-13 | Stop reason: SDUPTHER

## 2020-07-13 ASSESSMENT — ENCOUNTER SYMPTOMS
ABDOMINAL PAIN: 0
BLOOD IN STOOL: 0
DIARRHEA: 0
CONSTIPATION: 0
COUGH: 0
CHEST TIGHTNESS: 0
EYES NEGATIVE: 1
PHOTOPHOBIA: 0
WHEEZING: 0
VOMITING: 0
EYE REDNESS: 0

## 2020-07-13 NOTE — PROGRESS NOTES
OFFICE NOTE    20  Name: Caridad Torres  :1945   Sex:female   Age:74 y.o. SUBJECTIVE  Chief Complaint   Patient presents with    Hypertension    Diabetes       HPI comes in for 4 mos checkup. Lost quite a bit of weight from stress of taking care of Saige Renteria she thinks, was able to put on a few lbs recently and light at end of tunnel for  His surgery. Skips glucotrol doses if sugar low. Last A1C 7.1    Review of Systems   Constitutional: Positive for unexpected weight change. Negative for appetite change and fever. HENT: Negative for congestion, ear pain and postnasal drip. Eyes: Negative. Negative for photophobia, redness and visual disturbance. Respiratory: Negative for cough, chest tightness and wheezing. Cardiovascular: Negative for chest pain and palpitations. Gastrointestinal: Negative for abdominal pain, blood in stool, constipation, diarrhea and vomiting. Endocrine: Negative for cold intolerance, polydipsia and polyuria. Genitourinary: Negative for dysuria and hematuria. Musculoskeletal: Positive for arthralgias. Negative for gait problem and joint swelling. Hurt left shoulder and thumb in fall running to turn water off at hose attachment. Seeing Dr. Rene Esparza who injected her thumb and feels shoulder should be ok   Skin: Negative for rash and wound. Allergic/Immunologic: Negative for environmental allergies and food allergies. Neurological: Positive for numbness. Negative for dizziness, tremors, weakness and headaches. Hematological: Negative for adenopathy. Does not bruise/bleed easily. Psychiatric/Behavioral: Negative for behavioral problems, confusion, dysphoric mood and sleep disturbance. The patient is nervous/anxious.              Current Outpatient Medications:     ibuprofen (ADVIL;MOTRIN) 800 MG tablet, Take 1 tablet by mouth daily as needed for Pain, Disp: 60 tablet, Rfl: 5    glipiZIDE (GLUCOTROL) 5 MG tablet, Take 2 tablets by mouth 2 times daily, distension. Palpations: Abdomen is soft. There is no mass. Tenderness: There is no abdominal tenderness. There is no guarding. Hernia: No hernia is present. Musculoskeletal: Normal range of motion. Right lower leg: No edema. Left lower leg: No edema. Comments: OA of hands in particular basal joint on left is crepitant   Lymphadenopathy:      Cervical: No cervical adenopathy. Skin:     General: Skin is warm and dry. Coloration: Skin is not jaundiced. Findings: No bruising or rash. Neurological:      General: No focal deficit present. Mental Status: She is alert and oriented to person, place, and time. Sensory: No sensory deficit. Motor: No abnormal muscle tone. Psychiatric:         Mood and Affect: Mood normal.         Behavior: Behavior normal.           Saurav Pandey was seen today for hypertension and diabetes. Diagnoses and all orders for this visit:    Encounter for screening mammogram for malignant neoplasm of breast  -     KASSIE DIGITAL SCREEN W OR WO CAD BILATERAL; Future. Had u/s last time. Wanted routine bilateral now at 6 mos    Primary osteoarthritis involving multiple joints  -     DEXA BONE DENSITY 2 SITES; Future  -     ibuprofen (ADVIL;MOTRIN) 800 MG tablet; Take 1 tablet by mouth daily as needed for Pain  Takes Ibuprofen 1 on majority of days never 2. Is diabetic we need to monitor    Type II diabetes mellitus with peripheral circulatory disorder (HCC)  -     Microalbumin / Creatinine Urine Ratio; Future  -     Hemoglobin A1C; Future  May be able to back off with recent weight loss on her glipizide. Labs ordered  Essential hypertension  -     CBC Auto Differential; Future  -     Comprehensive Metabolic Panel; Future  -     TSH without Reflex; Future  -     Urinalysis; Future  Borderline but diastolic good, no changes made    Other hyperlipidemia  -     Lipid Panel;  Future  Not on statin at this time, labs ordered        Return in about 6 months (around 1/13/2021).     Electronically signed by Mozelle Merlin, MD on 7/13/20 at 8:31 AM EDT

## 2020-07-14 ENCOUNTER — TELEPHONE (OUTPATIENT)
Dept: FAMILY MEDICINE CLINIC | Age: 75
End: 2020-07-14

## 2020-07-14 RX ORDER — INSULIN DETEMIR 100 [IU]/ML
20 INJECTION, SOLUTION SUBCUTANEOUS NIGHTLY
Qty: 5 PEN | Refills: 5 | Status: SHIPPED
Start: 2020-07-14 | End: 2021-01-13

## 2020-07-15 ENCOUNTER — TELEPHONE (OUTPATIENT)
Dept: FAMILY MEDICINE CLINIC | Age: 75
End: 2020-07-15

## 2020-07-15 NOTE — TELEPHONE ENCOUNTER
Ad Brian called about the new script for Insulin. She does not want to start the insulin knowing that she does not watch her diet at all and does not take her diabetic meds on most days. She understands that this has increased her A1c. She is certain that if you giver her 3 more months to work on this the sugar will come down. Please advise.

## 2020-07-15 NOTE — TELEPHONE ENCOUNTER
She has to take her meds consistently. I suspect she has allowed her DM to be uncontrolled for too long and it won't work but I will give her 3 mos. She has asked us for this before though.  She needs to schedule apt for 3 mos and will do A1C during office visit at point of service

## 2020-09-02 ENCOUNTER — PROCEDURE VISIT (OUTPATIENT)
Dept: PODIATRY | Age: 75
End: 2020-09-02
Payer: MEDICARE

## 2020-09-02 VITALS — TEMPERATURE: 98 F | HEIGHT: 62 IN | WEIGHT: 132 LBS | BODY MASS INDEX: 24.29 KG/M2

## 2020-09-02 PROCEDURE — 11721 DEBRIDE NAIL 6 OR MORE: CPT | Performed by: PODIATRIST

## 2020-09-02 NOTE — PROGRESS NOTES
Patient in today for nail care. Patient does not have any complaints of pain at this time.  Patient's PCP is Sharri Pepper MD date of last ov    7-     Adeline Osorio LPN

## 2020-09-02 NOTE — PROGRESS NOTES
20     Escobar Postal    : 1945  Sex: female  Age: 69 y.o. Subjective: The patient is seen today for evaluation regarding diabetic foot evaluation and mycotic nail care. No other complaints noted. Chief Complaint   Patient presents with    Nail Problem       Current Medications:    Current Outpatient Medications:     insulin detemir (LEVEMIR FLEXTOUCH) 100 UNIT/ML injection pen, Inject 20 Units into the skin nightly Please include appropriate number of needles for 1 mos of injections with refills at same time as flexpens, Disp: 5 pen, Rfl: 5    ibuprofen (ADVIL;MOTRIN) 800 MG tablet, Take 1 tablet by mouth daily as needed for Pain, Disp: 60 tablet, Rfl: 5    methylPREDNISolone (MEDROL DOSEPACK) 4 MG tablet, Take by mouth., Disp: 1 kit, Rfl: 0    glipiZIDE (GLUCOTROL) 5 MG tablet, Take 2 tablets by mouth 2 times daily, Disp: , Rfl:     metFORMIN (GLUCOPHAGE) 500 MG tablet, Take 2 tablets by mouth 2 times daily, Disp: , Rfl:     vitamin E 600 units capsule, Take 600 Units by mouth daily, Disp: , Rfl:     vitamin C (ASCORBIC ACID) 500 MG tablet, Take 500 mg by mouth daily, Disp: , Rfl:     Multiple Vitamins-Minerals (MULTIVITAMIN PO), Take by mouth, Disp: , Rfl:     Biotin 5000 MCG TABS, Take by mouth, Disp: , Rfl:     Omega-3 Fatty Acids (OMEGA 3 PO), Take by mouth, Disp: , Rfl:     pioglitazone (ACTOS) 45 MG tablet, Take 45 mg by mouth daily. , Disp: , Rfl:     enalapril (VASOTEC) 20 MG tablet, Take 20 mg by mouth 2 times daily. , Disp: , Rfl:     Allergies:  No Known Allergies    Past Surgical History:   Procedure Laterality Date    BACK SURGERY      BACK SURGERY      3 back surgerys 3-4 year ago    HYSTERECTOMY      TONSILLECTOMY       Past Medical History:   Diagnosis Date    Chronic back pain     Corns and callosities     Diabetes mellitus ([place] [address]     Hyperlipidemia     Hypertension     Onychomycosis     Peripheral vascular disease (HCC)     Sciatica     Type 2 diabetes mellitus without complication (HCC)        Vitals:    09/02/20 0741   Temp: 98 °F (36.7 °C)   Weight: 132 lb (59.9 kg)   Height: 5' 2\" (1.575 m)       Exam:  Neurovascular status unchanged. At this time the nail/s 1, 2, 5 right foot and nail/s 1, 2, 5 left foot are noted to be thickened, dystrophic and discolored with subungual debris present. Tenderness noted to palpation. Minimal hair growth is noted to both lower extremities. Edema noted with both varicosities and stasis skin changes present bilaterally. Coolness is noted to the digital regions to palpation. Capillary fill time delayed digital areas bilateral foot. No heel fissuring or macerations of the web spaces. No plantar calluses and/or ulcerative areas are noted. Patient is having difficulty with gait/walking. Plan Per Assessment  Parth Liz was seen today for nail problem. Diagnoses and all orders for this visit:    Onychomycosis    Pain of toe of right foot    Pain of toe of left foot    PVD (peripheral vascular disease) ([facility] [address]    Type II diabetes mellitus with peripheral circulatory disorder ([facility] [address]        1. Evaluation and Management  2. Manual and electrical debridement of the mycotic nails was performed for thickness and length to prevent injection and/or ulceration. 3. I recommended antifungal cream to the nails daily. 4. It was discussed in detail with the patient proper caring for the vascular compromised foot. The fact that they have compromised blood flow put the patient at risk for infection/gangrene/amputation. The patient should not walk barefoot. Shoe gear should fit properly and socks should be worn with shoes. Exercise is very important to prevent worsening of the disease process but before performing an exercise program should check with their family physician first.  If any skin lesions are noted, they are instructed to contact the office immediately.     5. It was discussed in detail with the patient proper

## 2020-09-08 RX ORDER — TRAMADOL HYDROCHLORIDE 50 MG/1
50 TABLET ORAL 2 TIMES DAILY
Qty: 60 TABLET | Refills: 2 | Status: SHIPPED
Start: 2020-09-08 | End: 2021-01-13 | Stop reason: SDUPTHER

## 2020-09-08 NOTE — TELEPHONE ENCOUNTER
Last Appointment:  7/13/2020  Future Appointments   Date Time Provider Mariam Pleitez   10/14/2020  8:30 AM MD ROBERTA Finnegan [facility]   12/2/2020  1:30 PM JOE Florez [facility]   1/13/2021  9:00 AM MD ROBERTA Finnegan [facility]      Tr[facility]adol to [facility]

## 2020-09-17 ENCOUNTER — HOSPITAL ENCOUNTER (OUTPATIENT)
Dept: GENERAL RADIOLOGY | Age: 75
Discharge: HOME | End: 2020-09-19
Payer: MEDICARE

## 2020-09-17 PROCEDURE — 77063 BREAST TOMOSYNTHESIS BI: CPT

## 2020-09-17 PROCEDURE — 77080 DXA BONE DENSITY AXIAL: CPT

## 2020-10-14 ENCOUNTER — OFFICE VISIT (OUTPATIENT)
Dept: FAMILY MEDICINE CLINIC | Age: 75
End: 2020-10-14
Payer: MEDICARE

## 2020-10-14 VITALS
WEIGHT: 134 LBS | SYSTOLIC BLOOD PRESSURE: 140 MMHG | HEART RATE: 77 BPM | OXYGEN SATURATION: 98 % | DIASTOLIC BLOOD PRESSURE: 78 MMHG | BODY MASS INDEX: 24.51 KG/M2 | TEMPERATURE: 97.7 F

## 2020-10-14 LAB — HBA1C MFR BLD: 7 %

## 2020-10-14 PROCEDURE — G0008 ADMIN INFLUENZA VIRUS VAC: HCPCS | Performed by: FAMILY MEDICINE

## 2020-10-14 PROCEDURE — G8420 CALC BMI NORM PARAMETERS: HCPCS | Performed by: FAMILY MEDICINE

## 2020-10-14 PROCEDURE — 1036F TOBACCO NON-USER: CPT | Performed by: FAMILY MEDICINE

## 2020-10-14 PROCEDURE — 4040F PNEUMOC VAC/ADMIN/RCVD: CPT | Performed by: FAMILY MEDICINE

## 2020-10-14 PROCEDURE — 90694 VACC AIIV4 NO PRSRV 0.5ML IM: CPT | Performed by: FAMILY MEDICINE

## 2020-10-14 PROCEDURE — 99214 OFFICE O/P EST MOD 30 MIN: CPT | Performed by: FAMILY MEDICINE

## 2020-10-14 PROCEDURE — G8427 DOCREV CUR MEDS BY ELIG CLIN: HCPCS | Performed by: FAMILY MEDICINE

## 2020-10-14 PROCEDURE — 83036 HEMOGLOBIN GLYCOSYLATED A1C: CPT | Performed by: FAMILY MEDICINE

## 2020-10-14 PROCEDURE — 3051F HG A1C>EQUAL 7.0%<8.0%: CPT | Performed by: FAMILY MEDICINE

## 2020-10-14 PROCEDURE — 3017F COLORECTAL CA SCREEN DOC REV: CPT | Performed by: FAMILY MEDICINE

## 2020-10-14 PROCEDURE — 1123F ACP DISCUSS/DSCN MKR DOCD: CPT | Performed by: FAMILY MEDICINE

## 2020-10-14 PROCEDURE — 1090F PRES/ABSN URINE INCON ASSESS: CPT | Performed by: FAMILY MEDICINE

## 2020-10-14 PROCEDURE — G8484 FLU IMMUNIZE NO ADMIN: HCPCS | Performed by: FAMILY MEDICINE

## 2020-10-14 PROCEDURE — G8399 PT W/DXA RESULTS DOCUMENT: HCPCS | Performed by: FAMILY MEDICINE

## 2020-10-14 PROCEDURE — 2022F DILAT RTA XM EVC RTNOPTHY: CPT | Performed by: FAMILY MEDICINE

## 2020-10-14 RX ORDER — ESCITALOPRAM OXALATE 10 MG/1
10 TABLET ORAL DAILY
Qty: 30 TABLET | Refills: 5 | Status: SHIPPED
Start: 2020-10-14 | End: 2021-01-13 | Stop reason: SDUPTHER

## 2020-10-14 ASSESSMENT — ENCOUNTER SYMPTOMS
CHEST TIGHTNESS: 0
EYES NEGATIVE: 1
COUGH: 0
PHOTOPHOBIA: 0
EYE REDNESS: 0
VOMITING: 0
CONSTIPATION: 0
WHEEZING: 0
DIARRHEA: 0
BLOOD IN STOOL: 0
SHORTNESS OF BREATH: 0
SINUS PRESSURE: 1
ABDOMINAL PAIN: 0

## 2020-10-14 NOTE — PROGRESS NOTES
OFFICE NOTE    10/14/20  Name: Hortensia Amezquita  :1945   Sex:female   Age:74 y.o. SUBJECTIVE  Chief Complaint   Patient presents with    Diabetes       Patient presents for routine follow up. Continues to be stressed out by situation with brother. Denies need for medication refills. Never started insulin ordered after last A1c as she states she wasn't taking her already prescribed oral medications. A1c today 7.0   Admits she is overwhelmed taking care of her brother. Seems somewhat passive dependent. Experiencing caregiver burnout        Review of Systems   Constitutional: Positive for fatigue. Negative for appetite change, fever and unexpected weight change. HENT: Positive for sinus pressure. Negative for congestion, ear pain and postnasal drip. Eyes: Negative. Negative for photophobia, redness and visual disturbance. Respiratory: Negative for cough, chest tightness, shortness of breath and wheezing. Cardiovascular: Positive for palpitations. Negative for chest pain. Gastrointestinal: Negative for abdominal pain, blood in stool, constipation, diarrhea and vomiting. Endocrine: Negative for cold intolerance, polydipsia and polyuria. Genitourinary: Negative for dysuria and hematuria. Musculoskeletal: Positive for arthralgias. Negative for gait problem and joint swelling. Skin: Negative for rash and wound. Allergic/Immunologic: Negative for environmental allergies and food allergies. Neurological: Negative for dizziness, tremors, seizures, weakness, numbness and headaches. Hematological: Negative for adenopathy. Does not bruise/bleed easily. Psychiatric/Behavioral: Negative for behavioral problems, confusion, dysphoric mood and sleep disturbance. The patient is nervous/anxious.              Current Outpatient Medications:     escitalopram (LEXAPRO) 10 MG tablet, Take 1 tablet by mouth daily, Disp: 30 tablet, Rfl: 5    traMADol (ULTRAM) 50 MG tablet, Take 1 tablet by mouth 2 times daily for 90 days. , Disp: 60 tablet, Rfl: 2    insulin detemir (LEVEMIR FLEXTOUCH) 100 UNIT/ML injection pen, Inject 20 Units into the skin nightly Please include appropriate number of needles for 1 mos of injections with refills at same time as flexpens, Disp: 5 pen, Rfl: 5    ibuprofen (ADVIL;MOTRIN) 800 MG tablet, Take 1 tablet by mouth daily as needed for Pain, Disp: 60 tablet, Rfl: 5    glipiZIDE (GLUCOTROL) 5 MG tablet, Take 2 tablets by mouth 2 times daily, Disp: , Rfl:     metFORMIN (GLUCOPHAGE) 500 MG tablet, Take 2 tablets by mouth 2 times daily, Disp: , Rfl:     vitamin E 600 units capsule, Take 600 Units by mouth daily, Disp: , Rfl:     vitamin C (ASCORBIC ACID) 500 MG tablet, Take 500 mg by mouth daily, Disp: , Rfl:     Multiple Vitamins-Minerals (MULTIVITAMIN PO), Take by mouth, Disp: , Rfl:     Biotin 5000 MCG TABS, Take by mouth, Disp: , Rfl:     Omega-3 Fatty Acids (OMEGA 3 PO), Take by mouth, Disp: , Rfl:     pioglitazone (ACTOS) 45 MG tablet, Take 45 mg by mouth daily. , Disp: , Rfl:     enalapril (VASOTEC) 20 MG tablet, Take 20 mg by mouth 2 times daily. , Disp: , Rfl:   No Known Allergies    Past Medical History:   Diagnosis Date    Chronic back pain     Corns and callosities     Diabetes mellitus ([facility] [address]     Hyperlipidemia     Hypertension     Onychomycosis     Peripheral vascular disease (HCC)     Sciatica     Type 2 diabetes mellitus without complication (HCC)      Past Surgical History:   Procedure Laterality Date    BACK SURGERY      BACK SURGERY      3 back surgerys 3-4 year ago    HYSTERECTOMY      TONSILLECTOMY       No family history on file.   Social History     Tobacco History     Smoking Status  Never Smoker    Smokeless Tobacco Use  Never Used          Alcohol History     Alcohol Use Status  No          Drug Use     Drug Use Status  No          Sexual Activity     Sexually Active  Not Currently Partners  Male Birth Control/Protection  Post-menopausal OBJECTIVE  Vitals:    10/14/20 0844   BP: (!) 164/74   Site: Left Upper Arm   Position: Sitting   Pulse: 77   Temp: 97.7 °F (36.5 °C)   TempSrc: Temporal   SpO2: 98%   Weight: 134 lb (60.8 kg)        Body mass index is 24.51 kg/m². Patient is normal weight  2  Orders Placed This Encounter   Procedures    INFLUENZA, QUADV, ADJUVANTED, 65 YRS =, IM, PF, PREFILL SYR, 0.5ML (FLUAD)    POCT glycosylated hemoglobin (Hb A1C)        EXAM   Physical Exam  Vitals signs and nursing note reviewed. Constitutional:       Appearance: Normal appearance. She is normal weight. HENT:      Mouth/Throat:      Pharynx: Oropharynx is clear. Eyes:      Conjunctiva/sclera: Conjunctivae normal.   Cardiovascular:      Rate and Rhythm: Normal rate and regular rhythm. Pulses: Normal pulses. Heart sounds: No murmur. Pulmonary:      Effort: Pulmonary effort is normal.      Breath sounds: Normal breath sounds. No wheezing. Abdominal:      Palpations: There is no mass. Tenderness: There is no abdominal tenderness. Musculoskeletal:      Right lower leg: No edema. Left lower leg: No edema. Lymphadenopathy:      Cervical: No cervical adenopathy. Skin:     Coloration: Skin is not jaundiced. Findings: No bruising or rash. Neurological:      General: No focal deficit present. Mental Status: She is alert and oriented to person, place, and time. Psychiatric:      Comments: Tearful. Visibly relieved that Hgb A1C was normal           Rosamaria was seen today for diabetes. Diagnoses and all orders for this visit:    Type II diabetes mellitus with peripheral circulatory disorder (HCC)  -     POCT glycosylated hemoglobin (Hb A1C)  Hgb A1C went from 9.7 to 7.0 in 3 mos. No insulin will  Be needed    Burnout of caregiver  -     escitalopram (LEXAPRO) 10 MG tablet; Take 1 tablet by mouth daily  I will need to talk to his orthopedist about surgery.  Will clear if can go to [facility] after surgery for rehab and probably to live  Immunization due  -     INFLUENZA, QUADV, ADJUVANTED, 65 YRS =, IM, PF, PREFILL SYR, 0.5ML (FLUAD)    HBP. Came down on recheck. Was visibly upset at start of visit      Return in about 4 months (around 2/14/2021). Sooner if BP high at home or fails to improve with Lexapro. Electronically signed by Mary Albright MD on 10/14/20 at 9:13 AM EDT    I have personally reviewed and updated the chief complaint, HPI, Past Medical, Family and Social History, as well as the above Review of Systems.

## 2021-01-06 ENCOUNTER — PROCEDURE VISIT (OUTPATIENT)
Dept: PODIATRY | Age: 76
End: 2021-01-06
Payer: MEDICARE

## 2021-01-06 VITALS — WEIGHT: 134 LBS | HEIGHT: 62 IN | BODY MASS INDEX: 24.66 KG/M2

## 2021-01-06 DIAGNOSIS — M79.674 PAIN OF TOE OF RIGHT FOOT: ICD-10-CM

## 2021-01-06 DIAGNOSIS — M79.675 PAIN OF TOE OF LEFT FOOT: ICD-10-CM

## 2021-01-06 DIAGNOSIS — B35.1 ONYCHOMYCOSIS: Primary | ICD-10-CM

## 2021-01-06 DIAGNOSIS — E11.51 TYPE II DIABETES MELLITUS WITH PERIPHERAL CIRCULATORY DISORDER (HCC): ICD-10-CM

## 2021-01-06 DIAGNOSIS — I73.9 PVD (PERIPHERAL VASCULAR DISEASE) (HCC): ICD-10-CM

## 2021-01-06 PROCEDURE — 11721 DEBRIDE NAIL 6 OR MORE: CPT | Performed by: PODIATRIST

## 2021-01-06 NOTE — PROGRESS NOTES
21     Barrie Gottron    : 1945  Sex: female  Age: 70 y.o. Subjective: The patient is seen today for evaluation regarding diabetic foot evaluation and mycotic nail care. No other complaints noted. Chief Complaint   Patient presents with    Diabetes    Nail Problem     nail care       Current Medications:    Current Outpatient Medications:     escitalopram (LEXAPRO) 10 MG tablet, Take 1 tablet by mouth daily, Disp: 30 tablet, Rfl: 5    insulin detemir (LEVEMIR FLEXTOUCH) 100 UNIT/ML injection pen, Inject 20 Units into the skin nightly Please include appropriate number of needles for 1 mos of injections with refills at same time as flexpens, Disp: 5 pen, Rfl: 5    ibuprofen (ADVIL;MOTRIN) 800 MG tablet, Take 1 tablet by mouth daily as needed for Pain, Disp: 60 tablet, Rfl: 5    glipiZIDE (GLUCOTROL) 5 MG tablet, Take 2 tablets by mouth 2 times daily, Disp: , Rfl:     metFORMIN (GLUCOPHAGE) 500 MG tablet, Take 2 tablets by mouth 2 times daily, Disp: , Rfl:     vitamin E 600 units capsule, Take 600 Units by mouth daily, Disp: , Rfl:     vitamin C (ASCORBIC ACID) 500 MG tablet, Take 500 mg by mouth daily, Disp: , Rfl:     Multiple Vitamins-Minerals (MULTIVITAMIN PO), Take by mouth, Disp: , Rfl:     Biotin 5000 MCG TABS, Take by mouth, Disp: , Rfl:     Omega-3 Fatty Acids (OMEGA 3 PO), Take by mouth, Disp: , Rfl:     pioglitazone (ACTOS) 45 MG tablet, Take 45 mg by mouth daily. , Disp: , Rfl:     enalapril (VASOTEC) 20 MG tablet, Take 20 mg by mouth 2 times daily. , Disp: , Rfl:     Allergies:  No Known Allergies    Past Surgical History:   Procedure Laterality Date    BACK SURGERY      BACK SURGERY      3 back surgerys 3-4 year ago    HYSTERECTOMY      TONSILLECTOMY       Past Medical History:   Diagnosis Date    Chronic back pain     Corns and callosities     Diabetes mellitus ([address]     Hyperlipidemia     Hypertension     Onychomycosis     Peripheral vascular disease ([facility] [address]     Sciatica     Type 2 diabetes mellitus without complication ([facility])        Vitals:    01/06/21 0903   Weight: 134 lb (60.8 kg)   Height: 5' 2\" (1.575 m)       Exam:  Neurovascular status unchanged. At this time the nail/s 1, 2, 5 right foot and nail/s 1, 2, 5 left foot are noted to be thickened, dystrophic and discolored with subungual debris present. Tenderness noted to palpation. Minimal hair growth is noted to both lower extremities. Edema noted with both varicosities and stasis skin changes present bilaterally. Coolness is noted to the digital regions to palpation. Capillary fill time delayed digital areas bilateral foot. No heel fissuring or macerations of the web spaces. No plantar calluses and/or ulcerative areas are noted. Patient is having difficulty with gait/walking. Plan Per Assessment  Nuria Davies was seen today for diabetes and nail problem. Diagnoses and all orders for this visit:    Onychomycosis    Pain of toe of right foot    Pain of toe of left foot    PVD (peripheral vascular disease) ([facility] [address]    Type II diabetes mellitus with peripheral circulatory disorder ([facility] [address]        1. Evaluation and Management  2. Manual and electrical debridement of the mycotic nails was performed for thickness and length to prevent injection and/or ulceration. 3. I recommended antifungal cream to the nails daily. 4. It was discussed in detail with the patient proper caring for the vascular compromised foot. The fact that they have compromised blood flow put the patient at risk for infection/gangrene/amputation. The patient should not walk barefoot. Shoe gear should fit properly and socks should be worn with shoes. Exercise is very important to prevent worsening of the disease process but before performing an exercise program should check with their family physician first.  If any skin lesions are noted, they are instructed to contact the office immediately.     5. It was discussed in detail with the patient proper hygiene for the diabetic foot. They are to get in the habit of looking at their feet or have someone look at them. If they are unable to do daily, they are to look for any signs of redness, blistering, cracking, swelling, drainage, open lesions, etc.  They are to dry in between the toes after each bath or shower gently. The water should be tested with the elbow to prevent burns. The patient is to refrain from soaking their feet unless instructed by myself to do otherwise. They are to refrain from going barefoot. Shoe gear should be inspected for any foreign objects. Shoes should have a deep wide toe box. With any type of shoe, the feet should be inspected for any signs of pressure, i.e. redness, blistering, or open sores. Further instructional guidelines were dispensed to the patient. 6. We will see the patient back at a later date for continued podiatric management and care. Patient was advised to call the office with any questions or concerns prior to their next appointment if needed. Seen By:    Eric Plasencia DPM    Electronically signed by Eric Plasencia DPM on 1/6/2021 at 9:21 AM      This note was created using voice recognition software. The note was reviewed however may contain grammatical errors.

## 2021-01-06 NOTE — PROGRESS NOTES
Patient here for routine nail care. Patient has no new concerns for today.   Last ov with pcp was 7/13/2020 with Christiano Ruiz MD      Electronically signed by Tawana Duverney, MA on 1/6/2021 at 9:03 AM

## 2021-01-13 ENCOUNTER — OFFICE VISIT (OUTPATIENT)
Dept: FAMILY MEDICINE CLINIC | Age: 76
End: 2021-01-13
Payer: MEDICARE

## 2021-01-13 VITALS
HEART RATE: 84 BPM | SYSTOLIC BLOOD PRESSURE: 122 MMHG | DIASTOLIC BLOOD PRESSURE: 68 MMHG | OXYGEN SATURATION: 98 % | HEIGHT: 62 IN | BODY MASS INDEX: 24.84 KG/M2 | WEIGHT: 135 LBS

## 2021-01-13 DIAGNOSIS — Z73.0 BURNOUT OF CAREGIVER: ICD-10-CM

## 2021-01-13 DIAGNOSIS — K57.90 DIVERTICULOSIS: ICD-10-CM

## 2021-01-13 DIAGNOSIS — E11.51 TYPE II DIABETES MELLITUS WITH PERIPHERAL CIRCULATORY DISORDER (HCC): Primary | ICD-10-CM

## 2021-01-13 DIAGNOSIS — M15.9 PRIMARY OSTEOARTHRITIS INVOLVING MULTIPLE JOINTS: ICD-10-CM

## 2021-01-13 LAB — HBA1C MFR BLD: 6.5 %

## 2021-01-13 PROCEDURE — G8399 PT W/DXA RESULTS DOCUMENT: HCPCS | Performed by: FAMILY MEDICINE

## 2021-01-13 PROCEDURE — G8420 CALC BMI NORM PARAMETERS: HCPCS | Performed by: FAMILY MEDICINE

## 2021-01-13 PROCEDURE — G8484 FLU IMMUNIZE NO ADMIN: HCPCS | Performed by: FAMILY MEDICINE

## 2021-01-13 PROCEDURE — 83036 HEMOGLOBIN GLYCOSYLATED A1C: CPT | Performed by: FAMILY MEDICINE

## 2021-01-13 PROCEDURE — 3017F COLORECTAL CA SCREEN DOC REV: CPT | Performed by: FAMILY MEDICINE

## 2021-01-13 PROCEDURE — G8427 DOCREV CUR MEDS BY ELIG CLIN: HCPCS | Performed by: FAMILY MEDICINE

## 2021-01-13 PROCEDURE — 1036F TOBACCO NON-USER: CPT | Performed by: FAMILY MEDICINE

## 2021-01-13 PROCEDURE — 4040F PNEUMOC VAC/ADMIN/RCVD: CPT | Performed by: FAMILY MEDICINE

## 2021-01-13 PROCEDURE — 1090F PRES/ABSN URINE INCON ASSESS: CPT | Performed by: FAMILY MEDICINE

## 2021-01-13 PROCEDURE — 99214 OFFICE O/P EST MOD 30 MIN: CPT | Performed by: FAMILY MEDICINE

## 2021-01-13 PROCEDURE — 2022F DILAT RTA XM EVC RTNOPTHY: CPT | Performed by: FAMILY MEDICINE

## 2021-01-13 PROCEDURE — 3044F HG A1C LEVEL LT 7.0%: CPT | Performed by: FAMILY MEDICINE

## 2021-01-13 PROCEDURE — 1123F ACP DISCUSS/DSCN MKR DOCD: CPT | Performed by: FAMILY MEDICINE

## 2021-01-13 RX ORDER — ESCITALOPRAM OXALATE 10 MG/1
10 TABLET ORAL DAILY
Qty: 30 TABLET | Refills: 5 | Status: SHIPPED
Start: 2021-01-13 | End: 2021-05-05 | Stop reason: SDUPTHER

## 2021-01-13 RX ORDER — TRAMADOL HYDROCHLORIDE 50 MG/1
50 TABLET ORAL 2 TIMES DAILY
Qty: 60 TABLET | Refills: 2 | Status: SHIPPED
Start: 2021-01-13 | End: 2021-05-05 | Stop reason: SDUPTHER

## 2021-01-13 RX ORDER — IBUPROFEN 800 MG/1
800 TABLET ORAL DAILY PRN
Qty: 60 TABLET | Refills: 5 | Status: SHIPPED | OUTPATIENT
Start: 2021-01-13

## 2021-01-13 ASSESSMENT — ENCOUNTER SYMPTOMS
EYES NEGATIVE: 1
CHEST TIGHTNESS: 0
COUGH: 0
ABDOMINAL PAIN: 0
BLOOD IN STOOL: 0
DIARRHEA: 0
VOMITING: 0
SHORTNESS OF BREATH: 0
PHOTOPHOBIA: 0
CONSTIPATION: 0
WHEEZING: 0
EYE REDNESS: 0

## 2021-01-13 NOTE — PROGRESS NOTES
OFFICE NOTE    21  Name: Kody Segura  :1945   Sex:female   Age:75 y.o. SUBJECTIVE  Chief Complaint   Patient presents with    Diabetes       HPI comes in for f/u. Was very stressed caring for Stoney Gomez, now in ECF having fallen and broken femur proximal to TKR. We were concerned as her DM was slipping seriously and she was having a lot of anxiety and mood dysphoria    Review of Systems   Constitutional: Positive for fatigue. Negative for appetite change, fever and unexpected weight change. Appetite improving   HENT: Negative for congestion, ear pain and postnasal drip. Eyes: Negative. Negative for photophobia, redness and visual disturbance. Respiratory: Negative for cough, chest tightness, shortness of breath and wheezing. Cardiovascular: Negative for chest pain and palpitations. Gastrointestinal: Negative for abdominal pain, blood in stool, constipation, diarrhea and vomiting. Endocrine: Negative for cold intolerance, polydipsia and polyuria. Genitourinary: Positive for urgency. Negative for dysuria and hematuria. Musculoskeletal: Positive for arthralgias. Negative for gait problem and joint swelling. Skin: Negative for rash and wound. Allergic/Immunologic: Negative for environmental allergies and food allergies. Neurological: Negative for dizziness, tremors, seizures, weakness, numbness and headaches. Hematological: Negative for adenopathy. Does not bruise/bleed easily. Psychiatric/Behavioral: Negative for behavioral problems, confusion, dysphoric mood and sleep disturbance. The patient is nervous/anxious. All other systems reviewed and are negative.            Current Outpatient Medications:     ibuprofen (ADVIL;MOTRIN) 800 MG tablet, Take 1 tablet by mouth daily as needed for Pain, Disp: 60 tablet, Rfl: 5    glipiZIDE (GLUCOTROL) 5 MG tablet, Take 2 tablets by mouth 2 times daily, Disp: , Rfl:     metFORMIN (GLUCOPHAGE) 500 MG tablet, Take 2 tablets by mouth 2 times daily, Disp: , Rfl:     vitamin E 600 units capsule, Take 600 Units by mouth daily, Disp: , Rfl:     vitamin C (ASCORBIC ACID) 500 MG tablet, Take 500 mg by mouth daily, Disp: , Rfl:     Multiple Vitamins-Minerals (MULTIVITAMIN PO), Take by mouth, Disp: , Rfl:     Biotin 5000 MCG TABS, Take by mouth, Disp: , Rfl:     Omega-3 Fatty Acids (OMEGA 3 PO), Take by mouth, Disp: , Rfl:     enalapril (VASOTEC) 20 MG tablet, Take 20 mg by mouth 2 times daily. , Disp: , Rfl:     escitalopram (LEXAPRO) 10 MG tablet, Take 1 tablet by mouth daily, Disp: 30 tablet, Rfl: 5    pioglitazone (ACTOS) 45 MG tablet, Take 45 mg by mouth daily. , Disp: , Rfl:   No Known Allergies    Past Medical History:   Diagnosis Date    Chronic back pain     Corns and callosities     Diabetes mellitus ([facility] [address]     Hyperlipidemia     Hypertension     Onychomycosis     Peripheral vascular disease (HCC)     Sciatica     Type 2 diabetes mellitus without complication (HCC)      Past Surgical History:   Procedure Laterality Date    BACK SURGERY      BACK SURGERY      3 back surgerys 3-4 year ago    HYSTERECTOMY      TONSILLECTOMY       No family history on file. Social History     Tobacco History     Smoking Status  Never Smoker    Smokeless Tobacco Use  Never Used          Alcohol History     Alcohol Use Status  No          Drug Use     Drug Use Status  No          Sexual Activity     Sexually Active  Not Currently Partners  Male Birth Control/Protection  Post-menopausal                OBJECTIVE  Vitals:    01/13/21 0900   BP: 122/68   Pulse: 84   SpO2: 98%   Weight: 135 lb (61.2 kg)   Height: 5' 2\" (1.575 m)        Body mass index is 24.69 kg/m². Orders Placed This Encounter   Procedures    POCT glycosylated hemoglobin (Hb A1C)        EXAM   Physical Exam  Vitals signs and nursing note reviewed. Constitutional:       Appearance: Normal appearance. She is well-developed and normal weight.    HENT:      Right Ear: Tympanic membrane, ear canal and external ear normal.      Left Ear: Tympanic membrane, ear canal and external ear normal.      Nose: Nose normal.      Mouth/Throat:      Pharynx: Oropharynx is clear. Eyes:      Conjunctiva/sclera: Conjunctivae normal.      Pupils: Pupils are equal, round, and reactive to light. Neck:      Musculoskeletal: Normal range of motion and neck supple. Thyroid: No thyroid mass or thyromegaly. Vascular: No carotid bruit or JVD. Trachea: Trachea normal.   Cardiovascular:      Rate and Rhythm: Normal rate and regular rhythm. Pulses: Normal pulses. Heart sounds: Normal heart sounds. No murmur. No gallop. Pulmonary:      Effort: Pulmonary effort is normal.      Breath sounds: Normal breath sounds. No wheezing, rhonchi or rales. Abdominal:      General: Bowel sounds are normal. There is no distension. Palpations: Abdomen is soft. There is no mass. Tenderness: There is no abdominal tenderness. There is no guarding. Hernia: No hernia is present. Musculoskeletal: Normal range of motion. General: No swelling or tenderness. Right lower leg: No edema. Left lower leg: No edema. Lymphadenopathy:      Cervical: No cervical adenopathy. Skin:     General: Skin is warm and dry. Capillary Refill: Capillary refill takes less than 2 seconds. Coloration: Skin is not jaundiced. Findings: No bruising or rash. Neurological:      General: No focal deficit present. Mental Status: She is alert and oriented to person, place, and time. Sensory: No sensory deficit. Motor: No weakness or abnormal muscle tone. Coordination: Coordination normal.      Gait: Gait normal.   Psychiatric:         Mood and Affect: Mood normal.         Behavior: Behavior normal.           Yesenia Casas was seen today for diabetes.     Diagnoses and all orders for this visit:    Type II diabetes mellitus with peripheral circulatory disorder ([facility]  -

## 2021-04-07 ENCOUNTER — PROCEDURE VISIT (OUTPATIENT)
Dept: PODIATRY | Age: 76
End: 2021-04-07
Payer: MEDICARE

## 2021-04-07 VITALS — WEIGHT: 135 LBS | BODY MASS INDEX: 24.84 KG/M2 | HEIGHT: 62 IN

## 2021-04-07 DIAGNOSIS — M79.675 PAIN OF TOE OF LEFT FOOT: ICD-10-CM

## 2021-04-07 DIAGNOSIS — R26.2 DIFFICULTY WALKING: ICD-10-CM

## 2021-04-07 DIAGNOSIS — M79.674 PAIN OF TOE OF RIGHT FOOT: ICD-10-CM

## 2021-04-07 DIAGNOSIS — B35.1 ONYCHOMYCOSIS: Primary | ICD-10-CM

## 2021-04-07 DIAGNOSIS — E11.51 TYPE II DIABETES MELLITUS WITH PERIPHERAL CIRCULATORY DISORDER (HCC): ICD-10-CM

## 2021-04-07 DIAGNOSIS — I73.9 PVD (PERIPHERAL VASCULAR DISEASE) (HCC): ICD-10-CM

## 2021-04-07 PROCEDURE — 11721 DEBRIDE NAIL 6 OR MORE: CPT | Performed by: PODIATRIST

## 2021-04-07 NOTE — PROGRESS NOTES
m)       Exam:  Neurovascular status unchanged. At this time the nail/s 1, 2, 5 right foot and nail/s 1, 2, 5 left foot are noted to be thickened, dystrophic and discolored with subungual debris present. Tenderness noted to palpation. Minimal hair growth is noted to both lower extremities. Edema noted with both varicosities and stasis skin changes present bilaterally. Coolness is noted to the digital regions to palpation. Capillary fill time delayed digital areas bilateral foot. No heel fissuring or macerations of the web spaces. No plantar calluses and/or ulcerative areas are noted. Patient is having difficulty with gait/walking. Plan Per Assessment  Maximilian Rey was seen today for nail problem. Diagnoses and all orders for this visit:    Onychomycosis    Pain of toe of right foot    Pain of toe of left foot    Type II diabetes mellitus with peripheral circulatory disorder (HCC)    PVD (peripheral vascular disease) ([facility])    Difficulty walking        1. Evaluation and Management  2. Manual and electrical debridement of the mycotic nails was performed for thickness and length to prevent injection and/or ulceration. 3. I recommended antifungal cream to the nails daily. 4. It was discussed in detail with the patient proper caring for the vascular compromised foot. The fact that they have compromised blood flow put the patient at risk for infection/gangrene/amputation. The patient should not walk barefoot. Shoe gear should fit properly and socks should be worn with shoes. Exercise is very important to prevent worsening of the disease process but before performing an exercise program should check with their family physician first.  If any skin lesions are noted, they are instructed to contact the office immediately. 5. It was discussed in detail with the patient proper hygiene for the diabetic foot. They are to get in the habit of looking at their feet or have someone look at them.    If they are unable to do daily, they are to look for any signs of redness, blistering, cracking, swelling, drainage, open lesions, etc.  They are to dry in between the toes after each bath or shower gently. The water should be tested with the elbow to prevent burns. The patient is to refrain from soaking their feet unless instructed by myself to do otherwise. They are to refrain from going barefoot. Shoe gear should be inspected for any foreign objects. Shoes should have a deep wide toe box. With any type of shoe, the feet should be inspected for any signs of pressure, i.e. redness, blistering, or open sores. Further instructional guidelines were dispensed to the patient. 6. We will see the patient back at a later date for continued podiatric management and care. Patient was advised to call the office with any questions or concerns prior to their next appointment if needed. Seen By:    Linnette Flood DPM    Electronically signed by Linnette Flood DPM on 4/7/2021 at 9:47 AM      This note was created using voice recognition software. The note was reviewed however may contain grammatical errors.

## 2021-05-05 ENCOUNTER — OFFICE VISIT (OUTPATIENT)
Dept: FAMILY MEDICINE CLINIC | Age: 76
End: 2021-05-05
Payer: MEDICARE

## 2021-05-05 VITALS
SYSTOLIC BLOOD PRESSURE: 122 MMHG | HEIGHT: 62 IN | HEART RATE: 75 BPM | DIASTOLIC BLOOD PRESSURE: 70 MMHG | WEIGHT: 140.8 LBS | BODY MASS INDEX: 25.91 KG/M2 | TEMPERATURE: 97.8 F | OXYGEN SATURATION: 97 %

## 2021-05-05 DIAGNOSIS — M15.9 PRIMARY OSTEOARTHRITIS INVOLVING MULTIPLE JOINTS: ICD-10-CM

## 2021-05-05 DIAGNOSIS — Z00.00 ROUTINE GENERAL MEDICAL EXAMINATION AT A HEALTH CARE FACILITY: ICD-10-CM

## 2021-05-05 DIAGNOSIS — E78.49 OTHER HYPERLIPIDEMIA: ICD-10-CM

## 2021-05-05 DIAGNOSIS — K57.90 DIVERTICULOSIS: Primary | ICD-10-CM

## 2021-05-05 DIAGNOSIS — Z73.0 BURNOUT OF CAREGIVER: ICD-10-CM

## 2021-05-05 DIAGNOSIS — E11.51 TYPE II DIABETES MELLITUS WITH PERIPHERAL CIRCULATORY DISORDER (HCC): ICD-10-CM

## 2021-05-05 LAB
CREATININE URINE POCT: ABNORMAL
HBA1C MFR BLD: 6.4 %
MICROALBUMIN/CREAT 24H UR: ABNORMAL MG/G{CREAT}
MICROALBUMIN/CREAT UR-RTO: ABNORMAL

## 2021-05-05 PROCEDURE — 1090F PRES/ABSN URINE INCON ASSESS: CPT | Performed by: FAMILY MEDICINE

## 2021-05-05 PROCEDURE — 99214 OFFICE O/P EST MOD 30 MIN: CPT | Performed by: FAMILY MEDICINE

## 2021-05-05 PROCEDURE — G8417 CALC BMI ABV UP PARAM F/U: HCPCS | Performed by: FAMILY MEDICINE

## 2021-05-05 PROCEDURE — 82044 UR ALBUMIN SEMIQUANTITATIVE: CPT | Performed by: FAMILY MEDICINE

## 2021-05-05 PROCEDURE — 3017F COLORECTAL CA SCREEN DOC REV: CPT | Performed by: FAMILY MEDICINE

## 2021-05-05 PROCEDURE — G0439 PPPS, SUBSEQ VISIT: HCPCS | Performed by: FAMILY MEDICINE

## 2021-05-05 PROCEDURE — G8427 DOCREV CUR MEDS BY ELIG CLIN: HCPCS | Performed by: FAMILY MEDICINE

## 2021-05-05 PROCEDURE — 2022F DILAT RTA XM EVC RTNOPTHY: CPT | Performed by: FAMILY MEDICINE

## 2021-05-05 PROCEDURE — G8399 PT W/DXA RESULTS DOCUMENT: HCPCS | Performed by: FAMILY MEDICINE

## 2021-05-05 PROCEDURE — 4040F PNEUMOC VAC/ADMIN/RCVD: CPT | Performed by: FAMILY MEDICINE

## 2021-05-05 PROCEDURE — 1036F TOBACCO NON-USER: CPT | Performed by: FAMILY MEDICINE

## 2021-05-05 PROCEDURE — 3044F HG A1C LEVEL LT 7.0%: CPT | Performed by: FAMILY MEDICINE

## 2021-05-05 PROCEDURE — 83036 HEMOGLOBIN GLYCOSYLATED A1C: CPT | Performed by: FAMILY MEDICINE

## 2021-05-05 PROCEDURE — 1123F ACP DISCUSS/DSCN MKR DOCD: CPT | Performed by: FAMILY MEDICINE

## 2021-05-05 RX ORDER — ESCITALOPRAM OXALATE 10 MG/1
10 TABLET ORAL DAILY
Qty: 30 TABLET | Refills: 5 | Status: SHIPPED | OUTPATIENT
Start: 2021-05-05

## 2021-05-05 RX ORDER — TRAMADOL HYDROCHLORIDE 50 MG/1
50 TABLET ORAL 2 TIMES DAILY
Qty: 60 TABLET | Refills: 2 | Status: SHIPPED | OUTPATIENT
Start: 2021-05-05 | End: 2021-08-03

## 2021-05-05 ASSESSMENT — PATIENT HEALTH QUESTIONNAIRE - PHQ9: SUM OF ALL RESPONSES TO PHQ QUESTIONS 1-9: 0

## 2021-05-05 ASSESSMENT — LIFESTYLE VARIABLES
HOW OFTEN DO YOU HAVE SIX OR MORE DRINKS ON ONE OCCASION: NEVER
HOW OFTEN DO YOU HAVE A DRINK CONTAINING ALCOHOL: MONTHLY OR LESS
HOW OFTEN DURING THE LAST YEAR HAVE YOU FAILED TO DO WHAT WAS NORMALLY EXPECTED FROM YOU BECAUSE OF DRINKING: NEVER
HOW OFTEN DURING THE LAST YEAR HAVE YOU NEEDED AN ALCOHOLIC DRINK FIRST THING IN THE MORNING TO GET YOURSELF GOING AFTER A NIGHT OF HEAVY DRINKING: 0
AUDIT-C TOTAL SCORE: 0
HOW OFTEN DURING THE LAST YEAR HAVE YOU FAILED TO DO WHAT WAS NORMALLY EXPECTED FROM YOU BECAUSE OF DRINKING: 0
HOW OFTEN DURING THE LAST YEAR HAVE YOU HAD A FEELING OF GUILT OR REMORSE AFTER DRINKING: 0
HOW MANY STANDARD DRINKS CONTAINING ALCOHOL DO YOU HAVE ON A TYPICAL DAY: ONE OR TWO
AUDIT TOTAL SCORE: 0
HAS A RELATIVE, FRIEND, DOCTOR, OR ANOTHER HEALTH PROFESSIONAL EXPRESSED CONCERN ABOUT YOUR DRINKING OR SUGGESTED YOU CUT DOWN: NO
HAVE YOU OR SOMEONE ELSE BEEN INJURED AS A RESULT OF YOUR DRINKING: NO
HOW OFTEN DURING THE LAST YEAR HAVE YOU BEEN UNABLE TO REMEMBER WHAT HAPPENED THE NIGHT BEFORE BECAUSE YOU HAD BEEN DRINKING: NEVER
HOW OFTEN DURING THE LAST YEAR HAVE YOU BEEN UNABLE TO REMEMBER WHAT HAPPENED THE NIGHT BEFORE BECAUSE YOU HAD BEEN DRINKING: 0
HAVE YOU OR SOMEONE ELSE BEEN INJURED AS A RESULT OF YOUR DRINKING: 0
HOW OFTEN DURING THE LAST YEAR HAVE YOU FOUND THAT YOU WERE NOT ABLE TO STOP DRINKING ONCE YOU HAD STARTED: NEVER
AUDIT TOTAL SCORE: 1
HOW OFTEN DURING THE LAST YEAR HAVE YOU HAD A FEELING OF GUILT OR REMORSE AFTER DRINKING: NEVER
HOW OFTEN DURING THE LAST YEAR HAVE YOU NEEDED AN ALCOHOLIC DRINK FIRST THING IN THE MORNING TO GET YOURSELF GOING AFTER A NIGHT OF HEAVY DRINKING: NEVER
HOW OFTEN DURING THE LAST YEAR HAVE YOU FOUND THAT YOU WERE NOT ABLE TO STOP DRINKING ONCE YOU HAD STARTED: 0

## 2021-05-05 NOTE — PROGRESS NOTES
Medicare Annual Wellness Visit  Name: Amanda Georges Date: 2021   MRN: [id number] Sex: Female   Age: 70 y.o. Ethnicity: Non-/Non    : 1945 Race: Annabelle Duenas is here for Medicare AWV and Diabetes    Screenings for behavioral, psychosocial and functional/safety risks, and cognitive dysfunction are all negative except as indicated below. These results, as well as other patient data from the [address] form, are documented in Flowsheets linked to this Encounter. No Known Allergies    Prior to Visit Medications    Medication Sig Taking? Authorizing Provider   traMADol (ULTRAM) 50 MG tablet Take 1 tablet by mouth 2 times daily for 90 days. Yes Yvette Isidro MD   escitalopram (LEXAPRO) 10 MG tablet Take 1 tablet by mouth daily Yes Yvette Isidro MD   ibuprofen (ADVIL;MOTRIN) 800 MG tablet Take 1 tablet by mouth daily as needed for Pain  Yvette Isidro MD   glipiZIDE (GLUCOTROL) 5 MG tablet Take 2 tablets by mouth 2 times daily  Historical Provider, MD   metFORMIN (GLUCOPHAGE) 500 MG tablet Take 2 tablets by mouth 2 times daily  Historical Provider, MD   vitamin E 600 units capsule Take 600 Units by mouth daily  Historical Provider, MD   vitamin C (ASCORBIC ACID) 500 MG tablet Take 500 mg by mouth daily  Historical Provider, MD   Multiple Vitamins-Minerals (MULTIVITAMIN PO) Take by mouth  Historical Provider, MD   Biotin 5000 MCG TABS Take by mouth  Historical Provider, MD   Omega-3 Fatty Acids (OMEGA 3 PO) Take by mouth  Historical Provider, MD   pioglitazone (ACTOS) 45 MG tablet Take 45 mg by mouth daily. Historical Provider, MD   enalapril (VASOTEC) 20 MG tablet Take 20 mg by mouth 2 times daily.   Historical Provider, MD       Past Medical History:   Diagnosis Date    Chronic back pain     Corns and callosities     Diabetes mellitus ([facility]     Hyperlipidemia     Hypertension     Onychomycosis     Peripheral vascular disease (HCC)     Sciatica     Type 2 for at least 20 minutes 2-3 times per week?: (!) No  Have you lost any weight without trying in the past 3 months?: No  Do you eat only one meal per day?: No  Have you seen the dentist within the past year?: Yes  Body mass index: (!) 25.75  Health Habits/Nutrition Interventions:  · Inadequate physical activity:  educational materials provided to promote increased physical activity       Personalized Preventive Plan   Current Health Maintenance Status  Immunization History   Administered Date(s) Administered    Influenza, High Dose (Fluzone 65 yrs and older) 10/19/2018    Influenza, Quadv, adjuvanted, 65 yrs +, IM, PF (Fluad) 10/14/2020    Influenza, Triv, inactivated, subunit, adjuvanted, IM (Fluad 65 yrs and older) 12/03/2019    Pneumococcal Polysaccharide ([id number]) 12/03/2019    Td, unspecified formulation 06/20/2015        Health Maintenance   Topic Date Due    Hepatitis C screen  Never done    COVID-19 Vaccine (1) Never done    DTaP/Tdap/Td vaccine (1 - Tdap) 11/29/1966    Shingles Vaccine (1 of 2) Never done   [employer] Visit (AWV)  Never done    Diabetic microalbuminuria test  07/13/2021    Lipid screen  07/13/2021    Potassium monitoring  07/13/2021    Creatinine monitoring  07/13/2021    Diabetic retinal exam  11/19/2021    Diabetic foot exam  01/06/2022    A1C test (Diabetic or Prediabetic)  01/13/2022    Breast cancer screen  09/17/2022    Colon cancer screen colonoscopy  04/24/2028    DEXA (modify frequency per FRAX score)  Completed    Flu vaccine  Completed    Pneumococcal 65+ years Vaccine  Completed    Hepatitis A vaccine  Aged Out    Hib vaccine  Aged Out    Meningococcal (ACWY) vaccine  Aged Out     Recommendations for [place] Due: see orders and patient instructions/AVS.  .   Recommended screening schedule for the next 5-10 years is provided to the patient in written form: see Patient Ryder Schrader was seen today for medicare awv and diabetes. Diagnoses and all orders for this visit:    Diverticulosis    Primary osteoarthritis involving multiple joints  -     traMADol (ULTRAM) 50 MG tablet; Take 1 tablet by mouth 2 times daily for 90 days. Burnout of caregiver  -     escitalopram (LEXAPRO) 10 MG tablet; Take 1 tablet by mouth daily    Other hyperlipidemia    Type II diabetes mellitus with peripheral circulatory disorder (HCC)  -     POCT glycosylated hemoglobin (Hb A1C)  -     POCT microalbumin    Routine general medical examination at a health care facility           OFFICE NOTE    21  Name: Zaira Munoz  :1945   Sex:female   Age:75 y.o. SUBJECTIVE  Chief Complaint   Patient presents with    Medicare AWV    Diabetes       HPI /comes in for checkup and refills. Nickolas Jaramillo not showing much interest in making friends at [facility], stays in his room. She visits him twice a week, by appointment  Review of Systems   Primarily negative. No HEENT complaints, gets regular eye checkups. Denies cough, wheezing, palpitations, orthopnea, syncope. No rectal bleeding. Some heartburn and crampy abdominal pain. OA symptoms no joint swelling, no sciatica or neuralgia.  Anxious no psychotic symptoms        Current Outpatient Medications:     ibuprofen (ADVIL;MOTRIN) 800 MG tablet, Take 1 tablet by mouth daily as needed for Pain, Disp: 60 tablet, Rfl: 5    escitalopram (LEXAPRO) 10 MG tablet, Take 1 tablet by mouth daily, Disp: 30 tablet, Rfl: 5    glipiZIDE (GLUCOTROL) 5 MG tablet, Take 2 tablets by mouth 2 times daily, Disp: , Rfl:     metFORMIN (GLUCOPHAGE) 500 MG tablet, Take 2 tablets by mouth 2 times daily, Disp: , Rfl:     vitamin E 600 units capsule, Take 600 Units by mouth daily, Disp: , Rfl:     vitamin C (ASCORBIC ACID) 500 MG tablet, Take 500 mg by mouth daily, Disp: , Rfl:     Multiple Vitamins-Minerals (MULTIVITAMIN PO), Take by mouth, Disp: , Rfl:     Biotin 5000 MCG TABS, Take by mouth, Disp: , Rfl:     Omega-3 Fatty Acids (OMEGA 3 PO), Take by mouth, Disp: , Rfl:     pioglitazone (ACTOS) 45 MG tablet, Take 45 mg by mouth daily. , Disp: , Rfl:     enalapril (VASOTEC) 20 MG tablet, Take 20 mg by mouth 2 times daily. , Disp: , Rfl:   No Known Allergies    Past Medical History:   Diagnosis Date    Chronic back pain     Corns and callosities     Diabetes mellitus ([facility] [facility]     Hyperlipidemia     Hypertension     Onychomycosis     Peripheral vascular disease (HCC)     Sciatica     Type 2 diabetes mellitus without complication (HCC)      Past Surgical History:   Procedure Laterality Date    BACK SURGERY      BACK SURGERY      3 back surgerys 3-4 year ago    HYSTERECTOMY      TONSILLECTOMY       No family history on file. Social History     Tobacco History     Smoking Status  Never Smoker    Smokeless Tobacco Use  Never Used          Alcohol History     Alcohol Use Status  No          Drug Use     Drug Use Status  No          Sexual Activity     Sexually Active  Not Currently Partners  Male Birth Control/Protection  Post-menopausal                OBJECTIVE  Vitals:    05/05/21 0902   BP: 122/70   Site: Left Upper Arm   Position: Sitting   Pulse: 75   Temp: 97.8 °F (36.6 °C)   TempSrc: Temporal   SpO2: 97%   Weight: 140 lb 12.8 oz (63.9 kg)   Height: 5' 2\" (1.575 m)        Body mass index is 25.75 kg/m². No orders of the defined types were placed in this encounter. EXAM   Physical Exam  HEENT slight rosacea. TMs and throat clear, no thyroid enlargement or carotid bruits. Lungs clear to A&P, Cors RSR without murmurs. Abdomen without organomegally or masses, no joint effusions. Anxious, no psychotic symptoms      [place] was seen today for medicare awv and diabetes. Diagnoses and all orders for this visit:    Primary osteoarthritis involving multiple joints  Discouraged use Ibuprofen ant more than 400 mg 2-3 times a day    Burnout of caregiver  Things are better than when Juvenal Garrett was living with her.  Still frets, but has gotten him into Medicaid. At this point not interested in counselling. Will continue Lexapro        No follow-ups on file.     Electronically signed by Noel Reed MD on 5/5/21 at 9:21 AM EDT

## 2021-05-05 NOTE — LETTER
[address] [place] [address] Drive  [address] [place] [address]  Phone: [phone]  Fax: Lakesha Schumacher MD        May 5, 2021     [facility] [address] [address]      Dear Ian Hull:    Below are the results from your recent visit:    Resulted Orders   POCT glycosylated hemoglobin (Hb A1C)   Result Value Ref Range    Hemoglobin A1C 6.4 %   POCT microalbumin   Result Value Ref Range    Microalb, Ur 80mg/L     Creatinine Ur POCT 200mg/dL     Microalbumin Creatinine Ratio 30-300mg/g      The test results show that your current treatment is working. Please continue your current medication and plan. We recommend that you repeat the above test(s) in 1 year. If you have any questions or concerns, please don't hesitate to call.     Sincerely,        Linda Salazar MD

## 2021-07-07 ENCOUNTER — TELEPHONE (OUTPATIENT)
Dept: PODIATRY | Age: 76
End: 2021-07-07

## 2021-07-09 ENCOUNTER — PROCEDURE VISIT (OUTPATIENT)
Dept: PODIATRY | Age: 76
End: 2021-07-09
Payer: MEDICARE

## 2021-07-09 VITALS — BODY MASS INDEX: 25.76 KG/M2 | HEIGHT: 62 IN | WEIGHT: 140 LBS

## 2021-07-09 DIAGNOSIS — M79.674 PAIN OF TOE OF RIGHT FOOT: ICD-10-CM

## 2021-07-09 DIAGNOSIS — M79.675 PAIN OF TOE OF LEFT FOOT: ICD-10-CM

## 2021-07-09 DIAGNOSIS — I73.9 PVD (PERIPHERAL VASCULAR DISEASE) (HCC): ICD-10-CM

## 2021-07-09 DIAGNOSIS — R26.2 DIFFICULTY WALKING: ICD-10-CM

## 2021-07-09 DIAGNOSIS — E11.51 TYPE II DIABETES MELLITUS WITH PERIPHERAL CIRCULATORY DISORDER (HCC): ICD-10-CM

## 2021-07-09 DIAGNOSIS — B35.1 ONYCHOMYCOSIS: Primary | ICD-10-CM

## 2021-07-09 PROCEDURE — 11721 DEBRIDE NAIL 6 OR MORE: CPT | Performed by: PODIATRIST

## 2021-07-09 NOTE — PROGRESS NOTES
Patient in today for nail care. Patient does not have any complaints of pain at this time.  Patient's PCP is Vivi Pascual MD date of last ov 5/5/2021        Dasha Madrigal LPN

## 2021-07-10 NOTE — PROGRESS NOTES
21     [facility]    : 1945  Sex: female  Age: 70 y.o. Subjective: The patient is seen today for evaluation regarding diabetic foot evaluation and mycotic nail care. No other complaints noted. Chief Complaint   Patient presents with    Nail Problem       Current Medications:    Current Outpatient Medications:     traMADol (ULTRAM) 50 MG tablet, Take 1 tablet by mouth 2 times daily for 90 days. , Disp: 60 tablet, Rfl: 2    escitalopram (LEXAPRO) 10 MG tablet, Take 1 tablet by mouth daily, Disp: 30 tablet, Rfl: 5    ibuprofen (ADVIL;MOTRIN) 800 MG tablet, Take 1 tablet by mouth daily as needed for Pain, Disp: 60 tablet, Rfl: 5    glipiZIDE (GLUCOTROL) 5 MG tablet, Take 2 tablets by mouth 2 times daily, Disp: , Rfl:     metFORMIN (GLUCOPHAGE) 500 MG tablet, Take 2 tablets by mouth 2 times daily, Disp: , Rfl:     vitamin E 600 units capsule, Take 600 Units by mouth daily, Disp: , Rfl:     vitamin C (ASCORBIC ACID) 500 MG tablet, Take 500 mg by mouth daily, Disp: , Rfl:     Multiple Vitamins-Minerals (MULTIVITAMIN PO), Take by mouth, Disp: , Rfl:     Biotin 5000 MCG TABS, Take by mouth, Disp: , Rfl:     Omega-3 Fatty Acids (OMEGA 3 PO), Take by mouth, Disp: , Rfl:     pioglitazone (ACTOS) 45 MG tablet, Take 45 mg by mouth daily. , Disp: , Rfl:     enalapril (VASOTEC) 20 MG tablet, Take 20 mg by mouth 2 times daily. , Disp: , Rfl:     Allergies:  No Known Allergies    Past Surgical History:   Procedure Laterality Date    BACK SURGERY      BACK SURGERY      3 back surgerys 3-4 year ago    HYSTERECTOMY      TONSILLECTOMY       Past Medical History:   Diagnosis Date    Chronic back pain     Corns and callosities     Diabetes [facility] [address]     Hyperlipidemia     Hypertension     Onychomycosis     Peripheral vascular disease (HCC)     Sciatica     Type 2 diabetes mellitus without complication (HCC)        Vitals:    21 0821   Weight: 140 lb (63.5 kg)   Height: 5' 2\" (1.575 m)       Exam:  Neurovascular status unchanged. At this time the nail/s 1, 2, 5 right foot and nail/s 1, 2, 5 left foot are noted to be thickened, dystrophic and discolored with subungual debris present. Tenderness noted to palpation. Minimal hair growth is noted to both lower extremities. Edema noted with both varicosities and stasis skin changes present. Coolness is noted to the digital regions to palpation. Capillary fill time delayed digital areas bilateral foot. No heel fissuring or macerations of the web spaces. No plantar calluses and/or ulcerative areas are noted. Patient is having difficulty with gait/walking. Plan Per Assessment  Fannie Hodge was seen today for nail problem. Diagnoses and all orders for this visit:    Onychomycosis    Pain of toe of right foot    Pain of toe of left foot    PVD (peripheral vascular disease) ([address]    Type II diabetes mellitus with peripheral circulatory disorder (HCC)    Difficulty walking        1. Evaluation and Management  2. Manual and electrical debridement of the mycotic nails was performed for thickness and length to prevent injection and/or ulceration. 3. I recommended antifungal cream to the nails daily. 4. It was discussed in detail with the patient proper hygiene for the diabetic foot. They are to get in the habit of looking at their feet or have someone look at them. If they are unable to do daily, they are to look for any signs of redness, blistering, cracking, swelling, drainage, open lesions, etc.  They are to dry in between the toes after each bath or shower gently. The water should be tested with the elbow to prevent burns. The patient is to refrain from soaking their feet unless instructed by myself to do otherwise. They are to refrain from going barefoot. Shoe gear should be inspected for any foreign objects. Shoes should have a deep wide toe box.   With any type of shoe, the feet should be inspected for any signs of pressure, i.e. redness, blistering, or open sores. Further instructional guidelines were dispensed to the patient. 5. We will see the patient back at a later date for continued podiatric management and care. Patient was advised to call the office with any questions or concerns prior to their next appointment if needed. Seen By:    Jyotsna Pinzon DPM    Electronically signed by Jyotsna Pinzon DPM on 7/9/2021 at 10:15 PM      This note was created using voice recognition software. The note was reviewed however may contain grammatical errors.

## 2021-09-07 ENCOUNTER — OFFICE VISIT (OUTPATIENT)
Dept: FAMILY MEDICINE CLINIC | Age: 76
End: 2021-09-07
Payer: MEDICARE

## 2021-09-07 VITALS
BODY MASS INDEX: 25.42 KG/M2 | WEIGHT: 139 LBS | SYSTOLIC BLOOD PRESSURE: 126 MMHG | TEMPERATURE: 97.5 F | HEART RATE: 85 BPM | DIASTOLIC BLOOD PRESSURE: 66 MMHG | OXYGEN SATURATION: 97 %

## 2021-09-07 DIAGNOSIS — Z11.59 ENCOUNTER FOR HEPATITIS C SCREENING TEST FOR LOW RISK PATIENT: ICD-10-CM

## 2021-09-07 DIAGNOSIS — E78.49 OTHER HYPERLIPIDEMIA: ICD-10-CM

## 2021-09-07 DIAGNOSIS — Z23 IMMUNIZATION DUE: ICD-10-CM

## 2021-09-07 DIAGNOSIS — E11.51 TYPE II DIABETES MELLITUS WITH PERIPHERAL CIRCULATORY DISORDER (HCC): ICD-10-CM

## 2021-09-07 DIAGNOSIS — Z12.31 ENCOUNTER FOR SCREENING MAMMOGRAM FOR MALIGNANT NEOPLASM OF BREAST: Primary | ICD-10-CM

## 2021-09-07 PROCEDURE — 4040F PNEUMOC VAC/ADMIN/RCVD: CPT | Performed by: FAMILY MEDICINE

## 2021-09-07 PROCEDURE — 1123F ACP DISCUSS/DSCN MKR DOCD: CPT | Performed by: FAMILY MEDICINE

## 2021-09-07 PROCEDURE — 1090F PRES/ABSN URINE INCON ASSESS: CPT | Performed by: FAMILY MEDICINE

## 2021-09-07 PROCEDURE — 99214 OFFICE O/P EST MOD 30 MIN: CPT | Performed by: FAMILY MEDICINE

## 2021-09-07 PROCEDURE — 3017F COLORECTAL CA SCREEN DOC REV: CPT | Performed by: FAMILY MEDICINE

## 2021-09-07 PROCEDURE — G8417 CALC BMI ABV UP PARAM F/U: HCPCS | Performed by: FAMILY MEDICINE

## 2021-09-07 PROCEDURE — 2022F DILAT RTA XM EVC RTNOPTHY: CPT | Performed by: FAMILY MEDICINE

## 2021-09-07 PROCEDURE — G8427 DOCREV CUR MEDS BY ELIG CLIN: HCPCS | Performed by: FAMILY MEDICINE

## 2021-09-07 PROCEDURE — G8399 PT W/DXA RESULTS DOCUMENT: HCPCS | Performed by: FAMILY MEDICINE

## 2021-09-07 PROCEDURE — 1036F TOBACCO NON-USER: CPT | Performed by: FAMILY MEDICINE

## 2021-09-07 PROCEDURE — 3044F HG A1C LEVEL LT 7.0%: CPT | Performed by: FAMILY MEDICINE

## 2021-09-07 PROCEDURE — 90694 VACC AIIV4 NO PRSRV 0.5ML IM: CPT | Performed by: FAMILY MEDICINE

## 2021-09-07 PROCEDURE — G0008 ADMIN INFLUENZA VIRUS VAC: HCPCS | Performed by: FAMILY MEDICINE

## 2021-09-07 ASSESSMENT — ENCOUNTER SYMPTOMS
CONSTIPATION: 0
VOMITING: 0
BLOOD IN STOOL: 0
CHEST TIGHTNESS: 0
SINUS PRESSURE: 1
COUGH: 0
EYES NEGATIVE: 1
DIARRHEA: 1
SHORTNESS OF BREATH: 0
PHOTOPHOBIA: 0
WHEEZING: 0
ABDOMINAL PAIN: 0
EYE REDNESS: 0

## 2021-09-07 NOTE — PROGRESS NOTES
OFFICE NOTE    21  Name: Rylan Santos  :1945   Sex:female   Age:75 y.o. SUBJECTIVE  Chief Complaint   Patient presents with    Anxiety       Patient presents for routine follow up. Denies new complaints or concerns. Requires routine medication refills. sees Idalia Duffy twice a week, still frets about him        Review of Systems   Constitutional: Positive for fatigue. Negative for appetite change, fever and unexpected weight change. HENT: Positive for congestion and sinus pressure. Negative for ear pain and postnasal drip. Eyes: Negative. Negative for photophobia and redness. Respiratory: Negative for cough, chest tightness, shortness of breath and wheezing. Cardiovascular: Negative for chest pain and palpitations. Gastrointestinal: Positive for diarrhea. Negative for abdominal pain, blood in stool, constipation and vomiting. Sounds like IBS-D   Endocrine: Negative for cold intolerance, polydipsia and polyuria. Genitourinary: Positive for urgency. Negative for dysuria and hematuria. Musculoskeletal: Negative for arthralgias, gait problem and joint swelling. Skin: Negative for rash and wound. Allergic/Immunologic: Negative for environmental allergies and food allergies. Neurological: Negative for dizziness, tremors, seizures, weakness, numbness and headaches. Hematological: Negative for adenopathy. Does not bruise/bleed easily. Psychiatric/Behavioral: Negative for behavioral problems, confusion, dysphoric mood and sleep disturbance. The patient is nervous/anxious.              Current Outpatient Medications:     escitalopram (LEXAPRO) 10 MG tablet, Take 1 tablet by mouth daily, Disp: 30 tablet, Rfl: 5    ibuprofen (ADVIL;MOTRIN) 800 MG tablet, Take 1 tablet by mouth daily as needed for Pain, Disp: 60 tablet, Rfl: 5    glipiZIDE (GLUCOTROL) 5 MG tablet, Take 2 tablets by mouth 2 times daily, Disp: , Rfl:     metFORMIN (GLUCOPHAGE) 500 MG tablet, Take 2 tablets by mouth 2 times daily, Disp: , Rfl:     vitamin E 600 units capsule, Take 600 Units by mouth daily, Disp: , Rfl:     vitamin C (ASCORBIC ACID) 500 MG tablet, Take 500 mg by mouth daily, Disp: , Rfl:     Multiple Vitamins-Minerals (MULTIVITAMIN PO), Take by mouth, Disp: , Rfl:     Biotin 5000 MCG TABS, Take by mouth, Disp: , Rfl:     Omega-3 Fatty Acids (OMEGA 3 PO), Take by mouth, Disp: , Rfl:     pioglitazone (ACTOS) 45 MG tablet, Take 45 mg by mouth daily. , Disp: , Rfl:     enalapril (VASOTEC) 20 MG tablet, Take 20 mg by mouth 2 times daily. , Disp: , Rfl:   No Known Allergies    Past Medical History:   Diagnosis Date    Chronic back pain     Corns and callosities     Diabetes mellitus ([facility] [address]     Hyperlipidemia     Hypertension     Onychomycosis     Peripheral vascular disease (HCC)     Sciatica     Type 2 diabetes mellitus without complication (HCC)      Past Surgical History:   Procedure Laterality Date    BACK SURGERY      BACK SURGERY      3 back surgerys 3-4 year ago    HYSTERECTOMY      TONSILLECTOMY       No family history on file. Social History     Tobacco History     Smoking Status  Never Smoker    Smokeless Tobacco Use  Never Used          Alcohol History     Alcohol Use Status  No          Drug Use     Drug Use Status  No          Sexual Activity     Sexually Active  Not Currently Partners  Male Birth Control/Protection  Post-menopausal              OBJECTIVE  Vitals:    09/07/21 1009   BP: 126/66   Site: Left Upper Arm   Position: Sitting   Pulse: 85   Temp: 97.5 °F (36.4 °C)   TempSrc: Temporal   SpO2: 97%   Weight: 139 lb (63 kg)        Body mass index is 25.42 kg/m².     Patient is normal weight    Orders Placed This Encounter   Procedures    [facility]SIE WIL DIGITAL SCREEN BILATERAL PER PROTOCOL     Further imaging can be completed per [address] protocol     Standing Status:   Future     Standing Expiration Date:   11/7/2022    INFLUENZA, QUADV, ADJUVANTED, 65 YRS =, IM, PF, PREFILL SYR, 0.5ML (FLUAD)    CBC Auto Differential     Standing Status:   Future     Number of Occurrences:   1     Standing Expiration Date:   9/7/2022    Comprehensive Metabolic Panel     Standing Status:   Future     Number of Occurrences:   1     Standing Expiration Date:   9/7/2022    Lipid Panel     Standing Status:   Future     Number of Occurrences:   1     Standing Expiration Date:   9/7/2022     Order Specific Question:   Is Patient Fasting?/# of Hours     Answer:   8    TSH without Reflex     Standing Status:   Future     Number of Occurrences:   1     Standing Expiration Date:   9/7/2022    Hemoglobin A1C     Standing Status:   Future     Number of Occurrences:   1     Standing Expiration Date:   9/7/2022    Hepatitis C Antibody     Standing Status:   Future     Number of Occurrences:   1     Standing Expiration Date:   9/7/2022    Urinalysis     Standing Status:   Future     Number of Occurrences:   1     Standing Expiration Date:   9/7/2022     Order Specific Question:   SPECIFY(EX-CATH,MIDSTREAM,CYSTO,ETC)? Answer:   midstream        EXAM   Physical Exam  Vitals and nursing note reviewed. Constitutional:       Appearance: Normal appearance. She is well-developed and normal weight. HENT:      Right Ear: Tympanic membrane, ear canal and external ear normal.      Left Ear: Tympanic membrane, ear canal and external ear normal.      Nose: Nose normal.      Mouth/Throat:      Pharynx: Oropharynx is clear. No posterior oropharyngeal erythema. Eyes:      General: No scleral icterus. Conjunctiva/sclera: Conjunctivae normal.      Pupils: Pupils are equal, round, and reactive to light. Neck:      Thyroid: No thyroid mass or thyromegaly. Vascular: No carotid bruit or JVD. Trachea: Trachea normal.   Cardiovascular:      Rate and Rhythm: Normal rate and regular rhythm. Pulses: Normal pulses. Heart sounds: Normal heart sounds. No murmur heard. No gallop.     Pulmonary: Effort: Pulmonary effort is normal.      Breath sounds: Normal breath sounds. No wheezing, rhonchi or rales. Abdominal:      General: Bowel sounds are normal. There is no distension. Palpations: Abdomen is soft. There is no mass. Tenderness: There is no abdominal tenderness. There is no guarding. Hernia: No hernia is present. Musculoskeletal:         General: No swelling or tenderness. Normal range of motion. Cervical back: Normal range of motion and neck supple. Right lower leg: No edema. Left lower leg: No edema. Comments: Some OA of knees   Lymphadenopathy:      Cervical: No cervical adenopathy. Skin:     General: Skin is warm and dry. Capillary Refill: Capillary refill takes less than 2 seconds. Coloration: Skin is not jaundiced. Findings: No bruising or rash. Neurological:      General: No focal deficit present. Mental Status: She is alert and oriented to person, place, and time. Motor: No abnormal muscle tone. Psychiatric:         Mood and Affect: Mood normal.         Behavior: Behavior normal.           Hortensia Baires was seen today for anxiety. Diagnoses and all orders for this visit:    Encounter for screening mammogram for malignant neoplasm of breast  -     [facility] WIL DIGITAL SCREEN BILATERAL PER PROTOCOL; Future    Type II diabetes mellitus with peripheral circulatory disorder (HCC)  -     CBC Auto Differential; Future  -     Comprehensive Metabolic Panel; Future  -     Lipid Panel; Future  -     TSH without Reflex; Future  -     Hemoglobin A1C; Future  -     Urinalysis; Future  Control historically has been pretty good  Other hyperlipidemia  -     CBC Auto Differential; Future  -     Comprehensive Metabolic Panel; Future  -     Lipid Panel; Future  -     TSH without Reflex; Future  -     Urinalysis;  Future  Is not on a statin at this time  Encounter for hepatitis C screening test for low risk patient  -     Hepatitis C Antibody; Future    Immunization due  -     INFLUENZA, QUADV, ADJUVANTED, 65 YRS =, IM, PF, PREFILL SYR, 0.5ML (FLUAD)          Return in about 6 months (around 3/7/2022). Electronically signed by Anay Molina MD on 9/7/21 at 11:26 AM EDT    I have personally reviewed and updated the chief complaint, HPI, Past Medical, Family and Social History, as well as the above Review of Systems.

## 2021-09-13 ENCOUNTER — PROCEDURE VISIT (OUTPATIENT)
Dept: PODIATRY | Age: 76
End: 2021-09-13
Payer: MEDICARE

## 2021-09-13 VITALS — HEIGHT: 62 IN | WEIGHT: 139 LBS | BODY MASS INDEX: 25.58 KG/M2

## 2021-09-13 DIAGNOSIS — B35.1 ONYCHOMYCOSIS: Primary | ICD-10-CM

## 2021-09-13 DIAGNOSIS — E11.51 TYPE II DIABETES MELLITUS WITH PERIPHERAL CIRCULATORY DISORDER (HCC): ICD-10-CM

## 2021-09-13 DIAGNOSIS — M79.675 PAIN OF TOE OF LEFT FOOT: ICD-10-CM

## 2021-09-13 DIAGNOSIS — I73.9 PVD (PERIPHERAL VASCULAR DISEASE) (HCC): ICD-10-CM

## 2021-09-13 DIAGNOSIS — R26.2 DIFFICULTY WALKING: ICD-10-CM

## 2021-09-13 DIAGNOSIS — M79.674 PAIN OF TOE OF RIGHT FOOT: ICD-10-CM

## 2021-09-13 PROCEDURE — 11721 DEBRIDE NAIL 6 OR MORE: CPT | Performed by: PODIATRIST

## 2021-09-13 NOTE — PROGRESS NOTES
Patient in today for nail care. Patient does not have any complaints of pain at this time.  Patient's PCP is Shasta Sanchez MD date of last ov   9/7/2021      Lauri Rubin LPN

## 2021-09-13 NOTE — PROGRESS NOTES
21     Francis Li    : 1945  Sex: female  Age: 70 y.o. Subjective: The patient is seen today for evaluation regarding diabetic foot evaluation and mycotic nail care. No other complaints noted. Chief Complaint   Patient presents with    Nail Problem       Current Medications:    Current Outpatient Medications:     escitalopram (LEXAPRO) 10 MG tablet, Take 1 tablet by mouth daily, Disp: 30 tablet, Rfl: 5    ibuprofen (ADVIL;MOTRIN) 800 MG tablet, Take 1 tablet by mouth daily as needed for Pain, Disp: 60 tablet, Rfl: 5    glipiZIDE (GLUCOTROL) 5 MG tablet, Take 2 tablets by mouth 2 times daily, Disp: , Rfl:     metFORMIN (GLUCOPHAGE) 500 MG tablet, Take 2 tablets by mouth 2 times daily, Disp: , Rfl:     vitamin E 600 units capsule, Take 600 Units by mouth daily, Disp: , Rfl:     vitamin C (ASCORBIC ACID) 500 MG tablet, Take 500 mg by mouth daily, Disp: , Rfl:     Multiple Vitamins-Minerals (MULTIVITAMIN PO), Take by mouth, Disp: , Rfl:     Biotin 5000 MCG TABS, Take by mouth, Disp: , Rfl:     Omega-3 Fatty Acids (OMEGA 3 PO), Take by mouth, Disp: , Rfl:     pioglitazone (ACTOS) 45 MG tablet, Take 45 mg by mouth daily. , Disp: , Rfl:     enalapril (VASOTEC) 20 MG tablet, Take 20 mg by mouth 2 times daily. , Disp: , Rfl:     Allergies:  No Known Allergies    Past Surgical History:   Procedure Laterality Date    BACK SURGERY      BACK SURGERY      3 back surgerys 3-4 year ago    HYSTERECTOMY      TONSILLECTOMY       Past Medical History:   Diagnosis Date    Chronic back pain     Corns and callosities     Diabetes mellitus ([address]     Hyperlipidemia     Hypertension     Onychomycosis     [facility]     Sciatica     Type 2 diabetes mellitus without complication ([facility])        Vitals:    21 0926   Weight: 139 lb (63 kg)   Height: 5' 2\" (1.575 m)       Exam:  Pedal pulses diminished to palpation bilateral foot.   Capillary refill time delayed digital regions bilateral foot. At this time the nail/s 1, 2, 5 right foot and nail/s 1, 2, 5 left foot are noted to be thickened, dystrophic and discolored with subungual debris present. Tenderness noted to palpation. Minimal hair growth is noted to both lower extremities. Edema noted with both varicosities and stasis skin changes present bilaterally. Coolness is noted to the digital regions to palpation. Capillary fill time delayed digital areas bilateral foot. No heel fissuring or macerations of the web spaces. No plantar calluses and/or ulcerative areas are noted. Patient is having difficulty with gait/walking. Plan Per Assessment  Karin Sexton was seen today for nail problem. Diagnoses and all orders for this visit:    Onychomycosis    Pain of toe of right foot    Pain of toe of left foot    PVD (peripheral vascular disease) ([address]    Type II diabetes mellitus with peripheral circulatory disorder (HCC)    Difficulty walking        1. Evaluation and Management  2. Manual and electrical debridement of the mycotic nails was performed for thickness and length to prevent injection and/or ulceration. 3. I recommended antifungal cream to the nails daily. 4. It was discussed in detail with the patient proper caring for the vascular compromised foot. The fact that they have compromised blood flow put the patient at risk for infection/gangrene/amputation. The patient should not walk barefoot. Shoe gear should fit properly and socks should be worn with shoes. Exercise is very important to prevent worsening of the disease process but before performing an exercise program should check with their family physician first.  If any skin lesions are noted, they are instructed to contact the office immediately. 5. We will see the patient back at a later date for continued podiatric management and care.   Patient was advised to call the office with any questions or concerns prior to their next appointment if

## 2021-09-28 ENCOUNTER — HOSPITAL ENCOUNTER (OUTPATIENT)
Dept: GENERAL RADIOLOGY | Age: 76
Discharge: HOME | End: 2021-09-30
Payer: MEDICARE

## 2021-09-28 DIAGNOSIS — Z12.31 ENCOUNTER FOR SCREENING MAMMOGRAM FOR MALIGNANT NEOPLASM OF BREAST: ICD-10-CM

## 2021-09-28 PROCEDURE — 77063 BREAST TOMOSYNTHESIS BI: CPT

## 2021-11-19 ENCOUNTER — PROCEDURE VISIT (OUTPATIENT)
Dept: PODIATRY | Age: 76
End: 2021-11-19
Payer: MEDICARE

## 2021-11-19 VITALS — BODY MASS INDEX: 25.58 KG/M2 | WEIGHT: 139 LBS | HEIGHT: 62 IN

## 2021-11-19 DIAGNOSIS — E11.51 TYPE II DIABETES MELLITUS WITH PERIPHERAL CIRCULATORY DISORDER (HCC): ICD-10-CM

## 2021-11-19 DIAGNOSIS — B35.1 ONYCHOMYCOSIS: Primary | ICD-10-CM

## 2021-11-19 DIAGNOSIS — M79.674 PAIN OF TOE OF RIGHT FOOT: ICD-10-CM

## 2021-11-19 DIAGNOSIS — I73.9 PVD (PERIPHERAL VASCULAR DISEASE) (HCC): ICD-10-CM

## 2021-11-19 DIAGNOSIS — R26.2 DIFFICULTY WALKING: ICD-10-CM

## 2021-11-19 DIAGNOSIS — M79.675 PAIN OF TOE OF LEFT FOOT: ICD-10-CM

## 2021-11-19 PROCEDURE — 11721 DEBRIDE NAIL 6 OR MORE: CPT | Performed by: PODIATRIST

## 2021-11-19 NOTE — PROGRESS NOTES
21     [facility]    : 1945  Sex: female  Age: 70 y.o. Subjective: The patient is seen today for evaluation regarding diabetic foot evaluation and mycotic nail care. No other complaints noted. Chief Complaint   Patient presents with    Nail Problem       Current Medications:    Current Outpatient Medications:     escitalopram (LEXAPRO) 10 MG tablet, Take 1 tablet by mouth daily, Disp: 30 tablet, Rfl: 5    ibuprofen (ADVIL;MOTRIN) 800 MG tablet, Take 1 tablet by mouth daily as needed for Pain, Disp: 60 tablet, Rfl: 5    glipiZIDE (GLUCOTROL) 5 MG tablet, Take 2 tablets by mouth 2 times daily, Disp: , Rfl:     metFORMIN (GLUCOPHAGE) 500 MG tablet, Take 2 tablets by mouth 2 times daily, Disp: , Rfl:     vitamin E 600 units capsule, Take 600 Units by mouth daily, Disp: , Rfl:     vitamin C (ASCORBIC ACID) 500 MG tablet, Take 500 mg by mouth daily, Disp: , Rfl:     Multiple Vitamins-Minerals (MULTIVITAMIN PO), Take by mouth, Disp: , Rfl:     Biotin 5000 MCG TABS, Take by mouth, Disp: , Rfl:     Omega-3 Fatty Acids (OMEGA 3 PO), Take by mouth, Disp: , Rfl:     pioglitazone (ACTOS) 45 MG tablet, Take 45 mg by mouth daily. , Disp: , Rfl:     enalapril (VASOTEC) 20 MG tablet, Take 20 mg by mouth 2 times daily. , Disp: , Rfl:     Allergies:  No Known Allergies    Past Surgical History:   Procedure Laterality Date    BACK SURGERY      BACK SURGERY      3 back surgerys 3-4 year ago    BREAST BIOPSY Right     stereotactic    HYSTERECTOMY      TONSILLECTOMY       Past Medical History:   Diagnosis Date    Chronic back pain     Corns and callosities     Diabetes mellitus ([address]     Hyperlipidemia     Hypertension     Onychomycosis     Peripheral vascular disease (HCC)     Sciatica     Type 2 diabetes mellitus without complication ([address]        Vitals:    21 0907   Weight: 139 lb (63 kg)   Height: 5' 2\" (1.575 m)       Exam:  Pedal pulses diminished to palpation bilateral foot. Capillary refill time delayed digital regions bilateral foot. At this time the nail/s 1, 2, 5 right foot and nail/s 1, 2, 5 left foot are noted to be thickened, dystrophic and discolored with subungual debris present. Tenderness noted to palpation. Minimal hair growth is noted to both lower extremities. Edema noted with both varicosities and stasis skin changes noted bilaterally. Coolness is noted to the digital regions to palpation. Capillary fill time delayed digital areas bilateral foot. No heel fissuring or macerations of the web spaces. No plantar calluses and/or ulcerative areas are noted. Patient is having difficulty with gait/walking. Plan Per Assessment  Rosamaria was seen today for nail problem. Diagnoses and all orders for this visit:    Onychomycosis    Pain of toe of right foot    Pain of toe of left foot    PVD (peripheral vascular disease) ([address]    Type II diabetes mellitus with peripheral circulatory disorder (HCC)    Difficulty walking        1. Evaluation and Management  2. Manual and electrical debridement of the mycotic nails was performed for thickness and length to prevent injection and/or ulceration. 3. I recommended antifungal cream to the nails daily. 4. It was discussed in detail with the patient proper caring for the vascular compromised foot. The fact that they have compromised blood flow put the patient at risk for infection/gangrene/amputation. The patient should not walk barefoot. Shoe gear should fit properly and socks should be worn with shoes. Exercise is very important to prevent worsening of the disease process but before performing an exercise program should check with their family physician first.  If any skin lesions are noted, they are instructed to contact the office immediately. 5. We will see the patient back at a later date for continued podiatric management and care.   Patient was advised to call the office with any questions or concerns prior to their next appointment if needed. Seen By:    Caryn Dakins, DPM    Electronically signed by Caryn Dakins, DPM on 11/19/2021 at 9:25 AM      This note was created using voice recognition software. The note was reviewed however may contain grammatical errors.

## 2021-11-19 NOTE — PROGRESS NOTES
Patient in today for nail care. Patient does not have any complaints of pain at this time.  Patient's PCP is Estephanie Rosales MD date of last ov  9/7/2021       Silvia Funes LPN

## 2022-01-21 ENCOUNTER — PROCEDURE VISIT (OUTPATIENT)
Dept: PODIATRY | Age: 77
End: 2022-01-21
Payer: MEDICARE

## 2022-01-21 VITALS — WEIGHT: 139 LBS | HEIGHT: 62 IN | BODY MASS INDEX: 25.58 KG/M2

## 2022-01-21 DIAGNOSIS — E11.51 TYPE II DIABETES MELLITUS WITH PERIPHERAL CIRCULATORY DISORDER (HCC): ICD-10-CM

## 2022-01-21 DIAGNOSIS — R26.2 DIFFICULTY WALKING: ICD-10-CM

## 2022-01-21 DIAGNOSIS — I73.9 PVD (PERIPHERAL VASCULAR DISEASE) (HCC): ICD-10-CM

## 2022-01-21 DIAGNOSIS — M79.675 PAIN OF TOE OF LEFT FOOT: ICD-10-CM

## 2022-01-21 DIAGNOSIS — B35.1 ONYCHOMYCOSIS: Primary | ICD-10-CM

## 2022-01-21 DIAGNOSIS — M79.674 PAIN OF TOE OF RIGHT FOOT: ICD-10-CM

## 2022-01-21 PROCEDURE — 11721 DEBRIDE NAIL 6 OR MORE: CPT | Performed by: PODIATRIST

## 2022-01-21 NOTE — PROGRESS NOTES
22     [facility]    : 1945  Sex: female  Age: 78 y.o. Subjective: The patient is seen today for evaluation regarding diabetic foot evaluation and mycotic nail care. No other complaints noted. Chief Complaint   Patient presents with    Nail Problem       Current Medications:    Current Outpatient Medications:     escitalopram (LEXAPRO) 10 MG tablet, Take 1 tablet by mouth daily, Disp: 30 tablet, Rfl: 5    ibuprofen (ADVIL;MOTRIN) 800 MG tablet, Take 1 tablet by mouth daily as needed for Pain, Disp: 60 tablet, Rfl: 5    glipiZIDE (GLUCOTROL) 5 MG tablet, Take 2 tablets by mouth 2 times daily, Disp: , Rfl:     metFORMIN (GLUCOPHAGE) 500 MG tablet, Take 2 tablets by mouth 2 times daily, Disp: , Rfl:     vitamin E 600 units capsule, Take 600 Units by mouth daily, Disp: , Rfl:     vitamin C (ASCORBIC ACID) 500 MG tablet, Take 500 mg by mouth daily, Disp: , Rfl:     Multiple Vitamins-Minerals (MULTIVITAMIN PO), Take by mouth, Disp: , Rfl:     Biotin 5000 MCG TABS, Take by mouth, Disp: , Rfl:     Omega-3 Fatty Acids (OMEGA 3 PO), Take by mouth, Disp: , Rfl:     pioglitazone (ACTOS) 45 MG tablet, Take 45 mg by mouth daily. , Disp: , Rfl:     enalapril (VASOTEC) 20 MG tablet, Take 20 mg by mouth 2 times daily. , Disp: , Rfl:     Allergies:  No Known Allergies    Past Surgical History:   Procedure Laterality Date    BACK SURGERY      BACK SURGERY      3 back surgerys 3-4 year ago    BREAST BIOPSY Right     stereotactic    HYSTERECTOMY      TONSILLECTOMY       Past Medical History:   Diagnosis Date    Chronic back pain     Corns and callosities     Diabetes mellitus ([address]     Hyperlipidemia     Hypertension     Onychomycosis     Peripheral vascular disease (HCC)     Sciatica     Type 2 diabetes mellitus without complication [facility]        Vitals:    22 0857   Weight: 139 lb (63 kg)   Height: 5' 2\" (1.575 m)       Exam:  Pedal pulses diminished to palpation bilateral foot. Capillary refill time delayed digital regions bilateral foot. At this time the nail/s 1, 2, 5 right foot and nail/s 1, 2, 5 left foot are noted to be thickened, dystrophic and discolored with subungual debris present. Tenderness noted to palpation. Minimal hair growth is noted to both lower extremities. Edema noted with both varicosities and stasis skin changes present bilaterally. Coolness is noted to the digital regions to palpation. Capillary fill time delayed digital areas bilateral foot. No heel fissuring or macerations of the web spaces. No plantar calluses and/or ulcerative areas are noted. Patient is having difficulty with gait/walking. Plan Per Assessment  Jazzy Ward was seen today for nail problem. Diagnoses and all orders for this visit:    Onychomycosis    Pain of toe of right foot    Pain of toe of left foot    PVD (peripheral vascular disease) ([address]    Type II diabetes mellitus with peripheral circulatory disorder (HCC)    Difficulty walking        1. Evaluation and Management  2. Manual and electrical debridement of the mycotic nails was performed for thickness and length to prevent injection and/or ulceration. 3. I recommended antifungal cream to the nails daily. 4. It was discussed in detail with the patient proper caring for the vascular compromised foot. The fact that they have compromised blood flow put the patient at risk for infection/gangrene/amputation. The patient should not walk barefoot. Shoe gear should fit properly and socks should be worn with shoes. Exercise is very important to prevent worsening of the disease process but before performing an exercise program should check with their family physician first.  If any skin lesions are noted, they are instructed to contact the office immediately. 5. We will see the patient back at a later date for continued podiatric management and care.   Patient was advised to call the office with any questions or concerns prior to their next appointment if needed. Seen By:    Alok Sierra DPM    Electronically signed by Alok Sierra DPM on 1/21/2022 at 9:14 AM      This note was created using voice recognition software. The note was reviewed however may contain grammatical errors.

## 2022-01-26 ENCOUNTER — OFFICE VISIT (OUTPATIENT)
Dept: FAMILY MEDICINE CLINIC | Age: 77
End: 2022-01-26
Payer: MEDICARE

## 2022-01-26 VITALS
HEART RATE: 86 BPM | TEMPERATURE: 97.3 F | SYSTOLIC BLOOD PRESSURE: 150 MMHG | OXYGEN SATURATION: 98 % | WEIGHT: 138.8 LBS | DIASTOLIC BLOOD PRESSURE: 82 MMHG | BODY MASS INDEX: 25.39 KG/M2

## 2022-01-26 DIAGNOSIS — E78.49 OTHER HYPERLIPIDEMIA: ICD-10-CM

## 2022-01-26 DIAGNOSIS — I10 PRIMARY HYPERTENSION: Primary | ICD-10-CM

## 2022-01-26 DIAGNOSIS — Z12.11 SCREEN FOR COLON CANCER: ICD-10-CM

## 2022-01-26 DIAGNOSIS — E11.42 TYPE 2 DIABETES MELLITUS WITH DIABETIC POLYNEUROPATHY, WITHOUT LONG-TERM CURRENT USE OF INSULIN (HCC): ICD-10-CM

## 2022-01-26 PROBLEM — E11.51 TYPE II DIABETES MELLITUS WITH PERIPHERAL CIRCULATORY DISORDER (HCC): Status: RESOLVED | Noted: 2019-07-02 | Resolved: 2022-01-26

## 2022-01-26 LAB
CREATININE URINE POCT: NORMAL
HBA1C MFR BLD: 7.7 %
MICROALBUMIN/CREAT 24H UR: NORMAL MG/G{CREAT}
MICROALBUMIN/CREAT UR-RTO: NORMAL

## 2022-01-26 PROCEDURE — 3051F HG A1C>EQUAL 7.0%<8.0%: CPT | Performed by: FAMILY MEDICINE

## 2022-01-26 PROCEDURE — 3017F COLORECTAL CA SCREEN DOC REV: CPT | Performed by: FAMILY MEDICINE

## 2022-01-26 PROCEDURE — G8399 PT W/DXA RESULTS DOCUMENT: HCPCS | Performed by: FAMILY MEDICINE

## 2022-01-26 PROCEDURE — 93000 ELECTROCARDIOGRAM COMPLETE: CPT | Performed by: FAMILY MEDICINE

## 2022-01-26 PROCEDURE — G8417 CALC BMI ABV UP PARAM F/U: HCPCS | Performed by: FAMILY MEDICINE

## 2022-01-26 PROCEDURE — 82044 UR ALBUMIN SEMIQUANTITATIVE: CPT | Performed by: FAMILY MEDICINE

## 2022-01-26 PROCEDURE — 2022F DILAT RTA XM EVC RTNOPTHY: CPT | Performed by: FAMILY MEDICINE

## 2022-01-26 PROCEDURE — 4040F PNEUMOC VAC/ADMIN/RCVD: CPT | Performed by: FAMILY MEDICINE

## 2022-01-26 PROCEDURE — 99214 OFFICE O/P EST MOD 30 MIN: CPT | Performed by: FAMILY MEDICINE

## 2022-01-26 PROCEDURE — 1036F TOBACCO NON-USER: CPT | Performed by: FAMILY MEDICINE

## 2022-01-26 PROCEDURE — G8484 FLU IMMUNIZE NO ADMIN: HCPCS | Performed by: FAMILY MEDICINE

## 2022-01-26 PROCEDURE — 83036 HEMOGLOBIN GLYCOSYLATED A1C: CPT | Performed by: FAMILY MEDICINE

## 2022-01-26 PROCEDURE — 1123F ACP DISCUSS/DSCN MKR DOCD: CPT | Performed by: FAMILY MEDICINE

## 2022-01-26 PROCEDURE — 1090F PRES/ABSN URINE INCON ASSESS: CPT | Performed by: FAMILY MEDICINE

## 2022-01-26 PROCEDURE — G8427 DOCREV CUR MEDS BY ELIG CLIN: HCPCS | Performed by: FAMILY MEDICINE

## 2022-01-26 RX ORDER — TRAMADOL HYDROCHLORIDE 50 MG/1
TABLET ORAL
COMMUNITY
Start: 2022-01-05 | End: 2022-08-30

## 2022-01-26 RX ORDER — AMLODIPINE BESYLATE 5 MG/1
5 TABLET ORAL DAILY
Qty: 90 TABLET | Refills: 1 | Status: SHIPPED
Start: 2022-01-26 | End: 2022-08-30 | Stop reason: SDUPTHER

## 2022-01-26 ASSESSMENT — ENCOUNTER SYMPTOMS
CHEST TIGHTNESS: 0
CONSTIPATION: 0
VOMITING: 0
BLOOD IN STOOL: 0
EYE REDNESS: 0
PHOTOPHOBIA: 0
DIARRHEA: 0
EYES NEGATIVE: 1
ABDOMINAL PAIN: 0
SHORTNESS OF BREATH: 0
COUGH: 0
WHEEZING: 0

## 2022-01-26 NOTE — PROGRESS NOTES
OFFICE NOTE    22  Name: Alberto Headings  :1945   Sex:female   Age:76 y.o. SUBJECTIVE  Chief Complaint   Patient presents with    Diabetes       HPI comes in for checkup and refills. Deepti Patterson settled in at [facility], she still has to pay him daily visits. Discussed how she could wean this down some. Review of Systems   Constitutional: Positive for fatigue. Negative for appetite change, fever and unexpected weight change. HENT: Positive for congestion. Negative for ear pain and postnasal drip. Eyes: Negative. Negative for photophobia, redness and visual disturbance. Respiratory: Negative for cough, chest tightness, shortness of breath and wheezing. Cardiovascular: Negative for chest pain and palpitations. Gastrointestinal: Negative for abdominal pain, blood in stool, constipation, diarrhea and vomiting. Endocrine: Negative for cold intolerance, polydipsia and polyuria. Genitourinary: Positive for urgency. Negative for dysuria and hematuria. Musculoskeletal: Positive for arthralgias. Negative for gait problem and joint swelling. Skin: Negative for rash and wound. Allergic/Immunologic: Negative for environmental allergies and food allergies. Neurological: Negative for dizziness, tremors, seizures, weakness, numbness and headaches. Hematological: Negative for adenopathy. Does not bruise/bleed easily. Psychiatric/Behavioral: Negative for behavioral problems, confusion, dysphoric mood and sleep disturbance. The patient is nervous/anxious. All other systems reviewed and are negative.            Current Outpatient Medications:     traMADol (ULTRAM) 50 MG tablet, TAKE 1 TABLET BY MOUTH EVERY 4 TO 6 HOURS AS NEEDED FOR PAIN, Disp: , Rfl:     escitalopram (LEXAPRO) 10 MG tablet, Take 1 tablet by mouth daily, Disp: 30 tablet, Rfl: 5    ibuprofen (ADVIL;MOTRIN) 800 MG tablet, Take 1 tablet by mouth daily as needed for Pain, Disp: 60 tablet, Rfl: 5    glipiZIDE (GLUCOTROL) 5 MG tablet, Take 2 tablets by mouth 2 times daily, Disp: , Rfl:     metFORMIN (GLUCOPHAGE) 500 MG tablet, Take 2 tablets by mouth 2 times daily, Disp: , Rfl:     vitamin E 600 units capsule, Take 600 Units by mouth daily, Disp: , Rfl:     vitamin C (ASCORBIC ACID) 500 MG tablet, Take 500 mg by mouth daily, Disp: , Rfl:     Multiple Vitamins-Minerals (MULTIVITAMIN PO), Take by mouth, Disp: , Rfl:     Biotin 5000 MCG TABS, Take by mouth, Disp: , Rfl:     Omega-3 Fatty Acids (OMEGA 3 PO), Take by mouth, Disp: , Rfl:     pioglitazone (ACTOS) 45 MG tablet, Take 45 mg by mouth daily. , Disp: , Rfl:     enalapril (VASOTEC) 20 MG tablet, Take 20 mg by mouth 2 times daily. , Disp: , Rfl:   No Known Allergies    Past Medical History:   Diagnosis Date    Chronic back pain     Corns and callosities     [facility]     Hyperlipidemia     Hypertension     Onychomycosis     Peripheral vascular disease (HCC)     Sciatica     Type 2 diabetes mellitus without complication (HCC)      Past Surgical History:   Procedure Laterality Date    BACK SURGERY      BACK SURGERY      3 back surgerys 3-4 year ago    BREAST BIOPSY Right     stereotactic    HYSTERECTOMY      TONSILLECTOMY       No family history on file. Social History     Tobacco History     Smoking Status  Never Smoker    Smokeless Tobacco Use  Never Used          Alcohol History     Alcohol Use Status  No          Drug Use     Drug Use Status  No          Sexual Activity     Sexually Active  Not Currently Partners  Male Birth Control/Protection  Post-menopausal                OBJECTIVE  Vitals:    01/26/22 1012   BP: (!) 182/86   Site: Right Upper Arm   Position: Sitting   Pulse: 86   Temp: 97.3 °F (36.3 °C)   TempSrc: Temporal   SpO2: 98%   Weight: 138 lb 12.8 oz (63 kg)        Body mass index is 25.39 kg/m².     Orders Placed This Encounter   Procedures    POCT glycosylated hemoglobin (Hb A1C)    POCT microalbumin    POCT Fit Test     Standing Status: Future     Standing Expiration Date:   1/26/2023    EKG 12 Lead     Standing Status:   Future     Standing Expiration Date:   1/26/2023     Order Specific Question:   Reason for Exam?     Answer:   Hypertension        EXAM   Physical Exam  Vitals and nursing note reviewed. Constitutional:       Appearance: She is well-developed. HENT:      Right Ear: Tympanic membrane, ear canal and external ear normal.      Left Ear: Tympanic membrane, ear canal and external ear normal.      Nose: Congestion and rhinorrhea present. Mouth/Throat:      Pharynx: Oropharynx is clear. Posterior oropharyngeal erythema present. Eyes:      Conjunctiva/sclera: Conjunctivae normal.      Pupils: Pupils are equal, round, and reactive to light. Neck:      Thyroid: No thyroid mass or thyromegaly. Vascular: No carotid bruit or JVD. Trachea: Trachea normal.   Cardiovascular:      Rate and Rhythm: Normal rate and regular rhythm. Pulses: Normal pulses. Heart sounds: Normal heart sounds. No murmur heard. No gallop. Pulmonary:      Effort: Pulmonary effort is normal.      Breath sounds: Normal breath sounds. No wheezing, rhonchi or rales. Abdominal:      General: Bowel sounds are normal. There is no distension. Palpations: Abdomen is soft. There is no mass. Tenderness: There is no abdominal tenderness. There is no guarding. Musculoskeletal:         General: Normal range of motion. Cervical back: Normal range of motion and neck supple. No tenderness. Lymphadenopathy:      Cervical: No cervical adenopathy. Skin:     General: Skin is warm and dry. Findings: No rash. Neurological:      Mental Status: She is alert and oriented to person, place, and time. Motor: No abnormal muscle tone. Psychiatric:         Behavior: Behavior normal.           Jay Jay Momin was seen today for diabetes.     Diagnoses and all orders for this visit:    Primary hypertension  -     EKG 12 Lead; Future  -     POCT glycosylated hemoglobin (Hb A1C)  -     POCT microalbumin  Not taking her meds reliably she admits. Says she is too busy worrying about Eliza Danielle. Will add amlodipine 5 mg at hs  Type 2 diabetes mellitus with diabetic polyneuropathy, without long-term current use of insulin ([facility])  -     POCT glycosylated hemoglobin (Hb A1C)  -     POCT microalbumin  If above 8 will need to refer her to Endocrinology. Other hyperlipidemia  Not on statin, not interested at this time. May try her on combination product with amlodipine  Screen for colon cancer  -     POCT Fit Test; Future          No follow-ups on file.     Electronically signed by Elise Aviles MD on 1/26/22 at 11:15 AM EST

## 2022-04-15 ENCOUNTER — OFFICE VISIT (OUTPATIENT)
Dept: ORTHOPEDIC SURGERY | Age: 77
End: 2022-04-15
Payer: MEDICARE

## 2022-04-15 VITALS
SYSTOLIC BLOOD PRESSURE: 178 MMHG | WEIGHT: 138 LBS | DIASTOLIC BLOOD PRESSURE: 86 MMHG | BODY MASS INDEX: 25.4 KG/M2 | HEIGHT: 62 IN

## 2022-04-15 DIAGNOSIS — S80.02XA CONTUSION OF LEFT KNEE, INITIAL ENCOUNTER: ICD-10-CM

## 2022-04-15 DIAGNOSIS — M25.562 ACUTE PAIN OF LEFT KNEE: Primary | ICD-10-CM

## 2022-04-15 PROCEDURE — 99212 OFFICE O/P EST SF 10 MIN: CPT | Performed by: PHYSICIAN ASSISTANT

## 2022-04-15 NOTE — PATIENT INSTRUCTIONS
Patient Education        Contusion: Care Instructions  Overview     Contusion is the medical term for a bruise. It is the result of a direct blowor an impact, such as a fall. Contusions are common sports injuries. Most people think of a bruise as a black-and-blue spot. This happens when small blood vessels get torn and leak blood under the skin. But bones, muscles, and organs can also get bruised. This may damage deep tissues but not cause abruise you can see. The doctor will do a physical exam to find the location of your contusion. You may also have tests to make sure you do not have a more serious injury, such as a broken bone or nerve damage. These may include X-rays or other imaging testslike a CT scan or MRI. Deep-tissue contusions may cause pain and swelling. But if there is no seriousdamage, they will often get better in a few weeks with home treatment. The doctor has checked you carefully, but problems can develop later. If you notice any problems or new symptoms, get medical treatment right away. Follow-up care is a key part of your treatment and safety. Be sure to make and go to all appointments, and call your doctor if you are having problems. It's also a good idea to know your test results and keep alist of the medicines you take. How can you care for yourself at home?  Put ice or a cold pack on the sore area for 10 to 20 minutes at a time to stop swelling. Put a thin cloth between the ice pack and your skin.  Be safe with medicines. Read and follow all instructions on the label. ? If the doctor gave you a prescription medicine for pain, take it as prescribed. ? If you are not taking a prescription pain medicine, ask your doctor if you can take an over-the-counter medicine.  If you can, prop up the sore area on pillows as much as possible for the next few days. Try to keep the sore area above the level of your heart. When should you call for help?    Call your doctor now or seek immediate medical care if:     Your pain gets worse.      You have new or worse swelling.      You have tingling, weakness, or numbness in the area near the contusion.      The area near the contusion is cold or pale. Watch closely for changes in your health, and be sure to contact your doctor if:     You do not get better as expected. Where can you learn more? Go to https://chpepiceweb.[employer]. org and sign in to your dotCloud account. Enter S947 in the [facility] box to learn more about \"Contusion: Care Instructions. \"     If you do not have an account, please click on the \"Sign Up Now\" link. Current as of: July 1, 2021               Content Version: 13.2  © 2006-2022 [employer]. Care instructions adapted under license by [facility] ([facility]). If you have questions about a medical condition or this instruction, always ask your healthcare professional. [place] [address] any warranty or liability for your use of this information. Patient Education        Knee Pain or Injury: Care Instructions  Your Care Instructions     Injuries are a common cause of knee problems. Sudden (acute) injuries may be caused by a direct blow to the knee. They can also be caused by abnormal twisting, bending, or falling on the knee. Pain, bruising, or swelling may besevere, and may start within minutes of the injury. Overuse is another cause of knee pain. Other causes are climbing stairs, kneeling, and other activities that use the knee. Everyday wear and tear,especially as you get older, also can cause knee pain. Rest, along with home treatment, often relieves pain and allows your knee toheal. If you have a serious knee injury, you may need tests and treatment. Follow-up care is a key part of your treatment and safety. Be sure to make and go to all appointments, and call your doctor if you are having problems.  It's also a good idea to know your test results and keep alist of the medicines you take. How can you care for yourself at home?  Be safe with medicines. Read and follow all instructions on the label. ? If the doctor gave you a prescription medicine for pain, take it as prescribed. ? If you are not taking a prescription pain medicine, ask your doctor if you can take an over-the-counter medicine.  Rest and protect your knee. Take a break from any activity that may cause pain.  Put ice or a cold pack on your knee for 10 to 20 minutes at a time. Put a thin cloth between the ice and your skin.  Prop up a sore knee on a pillow when you ice it or anytime you sit or lie down for the next 3 days. Try to keep it above the level of your heart. This will help reduce swelling.  If your knee is not swollen, you can put moist heat, a heating pad, or a warm cloth on your knee.  If your doctor recommends an elastic bandage, sleeve, or other type of support for your knee, wear it as directed.  Follow your doctor's instructions about how much weight you can put on your leg. Use a cane, crutches, or a walker as instructed.  Follow your doctor's instructions about activity during your healing process. If you can do mild exercise, slowly increase your activity.  Reach and stay at a healthy weight. Extra weight can strain the joints, especially the knees and hips, and make the pain worse. Losing even a few pounds may help. When should you call for help? Call 911 anytime you think you may need emergency care. For example, call if:     You have symptoms of a blood clot in your lung (called a pulmonary embolism). These may include:  ? Sudden chest pain. ? Trouble breathing. ? Coughing up blood. Call your doctor now or seek immediate medical care if:     You have severe or increasing pain.      Your leg or foot turns cold or changes color.      You cannot stand or put weight on your knee.      Your knee looks twisted or bent out of shape.      You cannot move your knee.    You have signs of infection, such as:  ? Increased pain, swelling, warmth, or redness. ? Red streaks leading from the knee. ? Pus draining from a place on your knee. ? A fever.      You have signs of a blood clot in your leg (called a deep vein thrombosis), such as:  ? Pain in your calf, back of the knee, thigh, or groin. ? Redness and swelling in your leg or groin. Watch closely for changes in your health, and be sure to contact your doctor if:     You have tingling, weakness, or numbness in your knee.      You have any new symptoms, such as swelling.      You have bruises from a knee injury that last longer than 2 weeks.      You do not get better as expected. Where can you learn more? Go to https://[employer]. org and sign in to your [employer] account. Enter K195 in the [employer] box to learn more about \"Knee Pain or Injury: Care Instructions. \"     If you do not have an account, please click on the \"Sign Up Now\" link. Current as of: July 1, 2021               Content Version: 13.2  © 8548-6079 [employer]. Care instructions adapted under license by [facility] ([facility]). If you have questions about a medical condition or this instruction, always ask your healthcare professional. [address] any warranty or liability for your use of this information.

## 2022-04-15 NOTE — PROGRESS NOTES
[address] [facility] In Care  New Patient Note      CHIEF COMPLAINT:   Chief Complaint   Patient presents with    Knee Pain     Pt presents this this PM with L knee pain that she noticed after falling on the sidewalk. States that she notices tightness with movement, and pain is medial.       HISTORY OF PRESENT ILLNESS:                The patient is a 78 y.o. female who presents today with complaints of left knee pain that started earlier today after falling on the side walk. She states she hit her left knee and scraped her chin but did not hit her head in the fall. She does have an abrasion on the front of the knee. She does not have much pain now as she has used ice, tylenol, ibuprofen. She also uses Tramadol as she just had rotator cuff surgery recently. She denies any calf pain, pain that radiates into the lower leg or foot or decreased sensation in the leg. She has never injured this knee in the past.       Past Medical History:        Diagnosis Date    Arthritis     Carpal tunnel syndrome     Chronic back pain     Corns and callosities     Diabetes mellitus ([address]     Fibromyositis     Hyperlipidemia     Hypertension     Onychomycosis     Osteoarthritis     Osteoporosis     Peripheral vascular disease ([address]     Sciatica     Type 2 diabetes mellitus without complication (HCC)      Past Surgical History:        Procedure Laterality Date    ABDOMEN SURGERY      hysterectomy    BACK SURGERY      BACK SURGERY      3 back surgerys 3-4 year ago    BREAST BIOPSY Right     stereotactic    CARPAL TUNNEL RELEASE  2019    Left    HYSTERECTOMY      SHOULDER SURGERY      RTC, B    SPINAL FUSION      lumbar    TONSILLECTOMY       Current Medications:   No current facility-administered medications for this visit. Allergies:  Patient has no known allergies. Social History:   TOBACCO:   reports that she has never smoked.  She has never used smokeless tobacco.  ETOH:   reports no history of alcohol use. DRUGS:   reports no history of drug use. Review of Systems   Constitutional: Negative for fever, chills, diaphoresis, appetite change and fatigue. HENT: Negative for dental issues, hearing loss and tinnitus. Negative for congestion, sinus pressure, sneezing, sore throat. Negative for headache. Eyes: Negative for visual disturbance, blurred and double vision. Negative for pain, discharge, redness and itching  Respiratory: Negative for cough, shortness of breath and wheezing. Cardiovascular: Negative for chest pain, palpitations and leg swelling. No dyspnea on exertion   Gastrointestinal:   Negative for nausea, vomiting, abdominal pain, diarrhea, constipation  or black or bloody. Hematologic\Lymphatic:  negative for bleeding, petechiae,   Genitourinary: Negative for hematuria and difficulty urinating. Musculoskeletal: Negative for neck pain and stiffness. Negative for back pain and gait problem. +left knee pain, swelling  Skin: Negative for pallor, rash. +Abrasion on her chin  Neurological: Negative for dizziness, tremors, seizures, weakness, light-headedness, no TIA or stroke symptoms. No numbness and headaches. Psychiatric/Behavioral: Negative.      Physical Examination:   General appearance: alert, well appearing, and in no distress,  normal appearing weight  Mental status: alert, oriented to person, place, and time, normal mood, behavior, speech, dress, motor activity, and thought processes  Resp:   resp easy and unlabored, no audible wheezes note  Cardiac: distal pulses palpable, skin well perfused  Neurological: alert, oriented X3, normal speech, no focal findings or movement disorder noted, motor and sensory grossly normal bilaterally, normal muscle tone  HEENT: normochephalic atraumatic, external ears and eyes normal, sclera normal, neck supple  Extremities:   peripheral pulses normal, no edema, redness or tenderness in the calves   Skin: normal coloration, no rashes, no suspicious for any signs of infection such as erythema, drainage. If she develops any signs of infection she will call our office. We did obtain 4 view left knee x-rays in the office today which were negative for fx or dislocation. She does have moderate fluid collection. Because of this I recommended left knee aspiration. She gave verbal consent. We were unable to aspirate any fluid from her knee. I recommended ice, 20 minutes on 20 minutes off repeating throughout the day as tolerated. She can continue her ibuprofen, tylenol and tramadol as prescribed for symptomatic relief. I also recommended compression using ace wrap during the day for the next 48-72 hours, removing for sleep. Lastly, she should elevate the extremity to help decrease swelling. Patient voiced understanding and agrees with the treatment plan outlined for her in the office today. She will call the office with any additional questions or concerns she may have. If her pain does not improve or symptoms worsen she should call the office or return to the clinic. Otherwise, I am more than happy to see her back on an as-needed basis. Electronically signed by Geovanna Thurston PA-C on 4/15/22 at 2:44 PM EDT    **This report was transcribed using voice recognition software. Every effort was made to ensure accuracy; however, inadvertent computerized transcription errors may be present. **

## 2022-04-22 ENCOUNTER — PROCEDURE VISIT (OUTPATIENT)
Dept: PODIATRY | Age: 77
End: 2022-04-22
Payer: MEDICARE

## 2022-04-22 VITALS — BODY MASS INDEX: 25.4 KG/M2 | WEIGHT: 138 LBS | HEIGHT: 62 IN

## 2022-04-22 DIAGNOSIS — R26.2 DIFFICULTY WALKING: ICD-10-CM

## 2022-04-22 DIAGNOSIS — E11.51 TYPE II DIABETES MELLITUS WITH PERIPHERAL CIRCULATORY DISORDER (HCC): ICD-10-CM

## 2022-04-22 DIAGNOSIS — M79.674 PAIN OF TOE OF RIGHT FOOT: ICD-10-CM

## 2022-04-22 DIAGNOSIS — M79.675 PAIN OF TOE OF LEFT FOOT: ICD-10-CM

## 2022-04-22 DIAGNOSIS — I73.9 PVD (PERIPHERAL VASCULAR DISEASE) (HCC): ICD-10-CM

## 2022-04-22 DIAGNOSIS — B35.1 ONYCHOMYCOSIS: Primary | ICD-10-CM

## 2022-04-22 PROCEDURE — 11721 DEBRIDE NAIL 6 OR MORE: CPT | Performed by: PODIATRIST

## 2022-04-22 NOTE — PROGRESS NOTES
22     Barry Calderon    : 1945  Sex: female  Age: 78 y.o. Subjective: The patient is seen today for evaluation regarding diabetic foot evaluation and mycotic nail care. No other complaints noted. Chief Complaint   Patient presents with    Nail Problem    Diabetes    Callouses       Current Medications:    Current Outpatient Medications:     traMADol (ULTRAM) 50 MG tablet, TAKE 1 TABLET BY MOUTH EVERY 4 TO 6 HOURS AS NEEDED FOR PAIN, Disp: , Rfl:     amLODIPine (NORVASC) 5 MG tablet, Take 1 tablet by mouth daily, Disp: 90 tablet, Rfl: 1    escitalopram (LEXAPRO) 10 MG tablet, Take 1 tablet by mouth daily, Disp: 30 tablet, Rfl: 5    ibuprofen (ADVIL;MOTRIN) 800 MG tablet, Take 1 tablet by mouth daily as needed for Pain, Disp: 60 tablet, Rfl: 5    glipiZIDE (GLUCOTROL) 5 MG tablet, Take 2 tablets by mouth 2 times daily, Disp: , Rfl:     metFORMIN (GLUCOPHAGE) 500 MG tablet, Take 2 tablets by mouth 2 times daily, Disp: , Rfl:     vitamin E 600 units capsule, Take 600 Units by mouth daily, Disp: , Rfl:     vitamin C (ASCORBIC ACID) 500 MG tablet, Take 500 mg by mouth daily, Disp: , Rfl:     Multiple Vitamins-Minerals (MULTIVITAMIN PO), Take by mouth, Disp: , Rfl:     Biotin 5000 MCG TABS, Take by mouth, Disp: , Rfl:     Omega-3 Fatty Acids (OMEGA 3 PO), Take by mouth, Disp: , Rfl:     pioglitazone (ACTOS) 45 MG tablet, Take 45 mg by mouth daily. , Disp: , Rfl:     enalapril (VASOTEC) 20 MG tablet, Take 20 mg by mouth 2 times daily. , Disp: , Rfl:     Allergies:  No Known Allergies    Past Surgical History:   Procedure Laterality Date    ABDOMEN SURGERY      hysterectomy    BACK SURGERY      BACK SURGERY      3 back surgerys 3-4 year ago    BREAST BIOPSY Right     stereotactic    CARPAL TUNNEL RELEASE  2019    Left    HYSTERECTOMY      SHOULDER SURGERY      RTC, B    SPINAL FUSION      lumbar    TONSILLECTOMY       Past Medical History:   Diagnosis Date    Arthritis     Carpal tunnel syndrome     Chronic back pain     Corns and callosities     Diabetes mellitus [facility]     Fibromyositis     Hyperlipidemia     Hypertension     Onychomycosis     Osteoarthritis     Osteoporosis     Peripheral vascular disease ([facility]     Sciatica     Type 2 diabetes mellitus without complication (HCC)        Vitals:    04/22/22 0831   Weight: 138 lb (62.6 kg)   Height: 5' 2\" (1.575 m)       Exam:  Pedal pulses diminished to palpation bilateral foot. Capillary refill time delayed digital regions bilateral foot. At this time the nail/s 1, 2, 5 right foot and nail/s 1, 2, 5 left foot are noted to be thickened, dystrophic and discolored with subungual debris present. Tenderness noted to palpation. Minimal hair growth is noted to both lower extremities. Edema noted with both varicosities and stasis skin changes present bilaterally. Coolness is noted to the digital regions to palpation. Capillary fill time delayed digital areas bilateral foot. No heel fissuring or macerations of the web spaces. No plantar calluses and/or ulcerative areas are noted. Patient is having difficulty with gait/walking. Plan Per Assessment  Roc Velazquez was seen today for nail problem, diabetes and callouses. Diagnoses and all orders for this visit:    Onychomycosis    Pain of toe of right foot    Pain of toe of left foot    PVD (peripheral vascular disease) ([facility]    Type II diabetes mellitus with peripheral circulatory disorder (HCC)    Difficulty walking        1. Evaluation and Management  2. Manual and electrical debridement of the mycotic nails was performed for thickness and length to prevent injection and/or ulceration. 3. Discussed additional diabetic foot care techniques with patient in detail today. 4. It was discussed in detail with the patient proper caring for the vascular compromised foot. The fact that they have compromised blood flow put the patient at risk for infection/gangrene/amputation. The patient should not walk barefoot. Shoe gear should fit properly and socks should be worn with shoes. Exercise is very important to prevent worsening of the disease process but before performing an exercise program should check with their family physician first.  If any skin lesions are noted, they are instructed to contact the office immediately. 5. We will see the patient back at a later date for continued podiatric management and care. Patient was advised to call the office with any questions or concerns prior to their next appointment if needed. Seen By:    Joy Edward DPM    Electronically signed by Joy Edward DPM on 4/22/2022 at 8:45 AM      This note was created using voice recognition software. The note was reviewed however may contain grammatical errors.

## 2022-04-22 NOTE — PROGRESS NOTES
Patient in today for nail care and DM foot exam. Patient does not have any complaints of pain at this time.  Patient's PCP is Humble Rodríguez MD date of last ov 01/26/2022        Lien De Dios LPN

## 2022-07-25 ENCOUNTER — PROCEDURE VISIT (OUTPATIENT)
Dept: PODIATRY | Age: 77
End: 2022-07-25
Payer: MEDICARE

## 2022-07-25 VITALS — WEIGHT: 138 LBS | BODY MASS INDEX: 25.4 KG/M2 | HEIGHT: 62 IN

## 2022-07-25 DIAGNOSIS — M79.674 PAIN OF TOE OF RIGHT FOOT: ICD-10-CM

## 2022-07-25 DIAGNOSIS — R26.2 DIFFICULTY WALKING: ICD-10-CM

## 2022-07-25 DIAGNOSIS — B35.1 ONYCHOMYCOSIS: Primary | ICD-10-CM

## 2022-07-25 DIAGNOSIS — E11.51 TYPE II DIABETES MELLITUS WITH PERIPHERAL CIRCULATORY DISORDER (HCC): ICD-10-CM

## 2022-07-25 DIAGNOSIS — M79.675 PAIN OF TOE OF LEFT FOOT: ICD-10-CM

## 2022-07-25 DIAGNOSIS — I73.9 PVD (PERIPHERAL VASCULAR DISEASE) (HCC): ICD-10-CM

## 2022-07-25 PROCEDURE — 11721 DEBRIDE NAIL 6 OR MORE: CPT | Performed by: PODIATRIST

## 2022-07-25 NOTE — PROGRESS NOTES
Patient in today for nail care. Patient does not have any complaints of pain at this time.  Patient's PCP is Sonam Souza MD date of last ov 01/26/2022        Tatum Bains LPN

## 2022-07-25 NOTE — PROGRESS NOTES
22     Tamiko Keller    : 1945  Sex: female  Age: 78 y.o. Subjective: The patient is seen today for evaluation regarding diabetic foot evaluation and mycotic nail care. No other complaints noted. Chief Complaint   Patient presents with    Nail Problem       Current Medications:    Current Outpatient Medications:     traMADol (ULTRAM) 50 MG tablet, TAKE 1 TABLET BY MOUTH EVERY 4 TO 6 HOURS AS NEEDED FOR PAIN, Disp: , Rfl:     amLODIPine (NORVASC) 5 MG tablet, Take 1 tablet by mouth daily, Disp: 90 tablet, Rfl: 1    escitalopram (LEXAPRO) 10 MG tablet, Take 1 tablet by mouth daily, Disp: 30 tablet, Rfl: 5    ibuprofen (ADVIL;MOTRIN) 800 MG tablet, Take 1 tablet by mouth daily as needed for Pain, Disp: 60 tablet, Rfl: 5    glipiZIDE (GLUCOTROL) 5 MG tablet, Take 2 tablets by mouth 2 times daily, Disp: , Rfl:     metFORMIN (GLUCOPHAGE) 500 MG tablet, Take 2 tablets by mouth 2 times daily, Disp: , Rfl:     vitamin E 600 units capsule, Take 600 Units by mouth daily, Disp: , Rfl:     vitamin C (ASCORBIC ACID) 500 MG tablet, Take 500 mg by mouth daily, Disp: , Rfl:     Multiple Vitamins-Minerals (MULTIVITAMIN PO), Take by mouth, Disp: , Rfl:     Biotin 5000 MCG TABS, Take by mouth, Disp: , Rfl:     Omega-3 Fatty Acids (OMEGA 3 PO), Take by mouth, Disp: , Rfl:     pioglitazone (ACTOS) 45 MG tablet, Take 45 mg by mouth daily. , Disp: , Rfl:     enalapril (VASOTEC) 20 MG tablet, Take 20 mg by mouth 2 times daily. , Disp: , Rfl:     Allergies:  No Known Allergies    Past Surgical History:   Procedure Laterality Date    ABDOMEN SURGERY      hysterectomy    BACK SURGERY      BACK SURGERY      3 back surgerys 3-4 year ago    BREAST BIOPSY Right     stereotactic    CARPAL TUNNEL RELEASE  2019    Left    HYSTERECTOMY (CERVIX STATUS UNKNOWN)      SHOULDER SURGERY      RTC, B    SPINAL FUSION      lumbar    TONSILLECTOMY       Past Medical History:   Diagnosis Date    Arthritis     Carpal tunnel syndrome     Chronic back pain     Corns and callosities     Diabetes mellitus ([facility]     Fibromyositis     Hyperlipidemia     Hypertension     Onychomycosis     Osteoarthritis     Osteoporosis     Peripheral vascular disease ([facility]     Sciatica     Type 2 diabetes mellitus without complication (HCC)        Vitals:    07/25/22 0825   Weight: 138 lb (62.6 kg)   Height: 5' 2\" (1.575 m)       Exam:  Pedal pulses diminished to palpation bilateral foot. Capillary refill time delayed digital regions bilateral foot. At this time the nail/s 1, 2, 5 right foot and nail/s 1, 2, 5 left foot are noted to be thickened, dystrophic and discolored with subungual debris present. Tenderness noted to palpation. Minimal hair growth is noted to both lower extremities. Edema noted with both varicosities and stasis skin changes present bilaterally. Coolness is noted to the digital regions to palpation. Capillary fill time delayed digital areas bilateral foot. No heel fissuring or macerations of the web spaces. No plantar calluses and/or ulcerative areas are noted. Patient is having difficulty with gait/walking. Plan Per Assessment  Jamel Andersen was seen today for nail problem. Diagnoses and all orders for this visit:    Onychomycosis    Pain of toe of right foot    Pain of toe of left foot    PVD (peripheral vascular disease) ([facility]    Type II diabetes mellitus with peripheral circulatory disorder (HCC)    Difficulty walking        1. Evaluation and Management  2. Manual and electrical debridement of the mycotic nails was performed for thickness and length to prevent injection and/or ulceration. 3. Discussed additional diabetic lower extremity care techniques with patient today. 4. It was discussed in detail with the patient proper caring for the vascular compromised foot. The fact that they have compromised blood flow put the patient at risk for infection/gangrene/amputation. The patient should not walk barefoot.   Shoe gear should fit properly and socks should be worn with shoes. If any skin lesions are noted, they are instructed to contact the office immediately. 5. We will see the patient back at a later date for continued podiatric management and care. Patient was advised to call the office with any questions or concerns prior to their next appointment if needed. Seen By:    Maurisio Armendariz DPM    Electronically signed by Maurisio Armendariz DPM on 7/25/2022 at 8:51 AM      This note was created using voice recognition software. The note was reviewed however may contain grammatical errors.

## 2022-08-30 ENCOUNTER — OFFICE VISIT (OUTPATIENT)
Dept: FAMILY MEDICINE CLINIC | Age: 77
End: 2022-08-30
Payer: MEDICARE

## 2022-08-30 VITALS
WEIGHT: 143 LBS | TEMPERATURE: 97.8 F | HEART RATE: 70 BPM | BODY MASS INDEX: 26.31 KG/M2 | SYSTOLIC BLOOD PRESSURE: 136 MMHG | DIASTOLIC BLOOD PRESSURE: 82 MMHG | OXYGEN SATURATION: 98 % | HEIGHT: 62 IN | RESPIRATION RATE: 18 BRPM

## 2022-08-30 DIAGNOSIS — I10 PRIMARY HYPERTENSION: ICD-10-CM

## 2022-08-30 DIAGNOSIS — E11.22 TYPE 2 DIABETES MELLITUS WITH STAGE 3 CHRONIC KIDNEY DISEASE, WITHOUT LONG-TERM CURRENT USE OF INSULIN, UNSPECIFIED WHETHER STAGE 3A OR 3B CKD (HCC): ICD-10-CM

## 2022-08-30 DIAGNOSIS — N18.31 STAGE 3A CHRONIC KIDNEY DISEASE (HCC): ICD-10-CM

## 2022-08-30 DIAGNOSIS — N18.30 TYPE 2 DIABETES MELLITUS WITH STAGE 3 CHRONIC KIDNEY DISEASE, WITHOUT LONG-TERM CURRENT USE OF INSULIN, UNSPECIFIED WHETHER STAGE 3A OR 3B CKD (HCC): ICD-10-CM

## 2022-08-30 DIAGNOSIS — Z12.31 ENCOUNTER FOR SCREENING MAMMOGRAM FOR BREAST CANCER: ICD-10-CM

## 2022-08-30 DIAGNOSIS — M51.36 LUMBAR DEGENERATIVE DISC DISEASE: ICD-10-CM

## 2022-08-30 DIAGNOSIS — Z91.81 AT HIGH RISK FOR FALLS: Primary | ICD-10-CM

## 2022-08-30 PROBLEM — E11.9 TYPE 2 DIABETES MELLITUS (HCC): Status: ACTIVE | Noted: 2022-08-30

## 2022-08-30 LAB
ALBUMIN SERPL-MCNC: 4.3 G/DL (ref 3.5–5.2)
ALP BLD-CCNC: 73 U/L (ref 35–104)
ALT SERPL-CCNC: 19 U/L (ref 0–32)
ANION GAP SERPL CALCULATED.3IONS-SCNC: 16 MMOL/L (ref 7–16)
AST SERPL-CCNC: 19 U/L (ref 0–31)
BASOPHILS ABSOLUTE: 0.03 E9/L (ref 0–0.2)
BASOPHILS RELATIVE PERCENT: 0.5 % (ref 0–2)
BILIRUB SERPL-MCNC: 0.2 MG/DL (ref 0–1.2)
BUN BLDV-MCNC: 33 MG/DL (ref 6–23)
CALCIUM SERPL-MCNC: 9.3 MG/DL (ref 8.6–10.2)
CHLORIDE BLD-SCNC: 111 MMOL/L (ref 98–107)
CHOLESTEROL, TOTAL: 217 MG/DL (ref 0–199)
CO2: 17 MMOL/L (ref 22–29)
CREAT SERPL-MCNC: 1.2 MG/DL (ref 0.5–1)
CREATININE URINE: 107 MG/DL (ref 29–226)
EOSINOPHILS ABSOLUTE: 0.13 E9/L (ref 0.05–0.5)
EOSINOPHILS RELATIVE PERCENT: 2.2 % (ref 0–6)
GFR AFRICAN AMERICAN: 53
GFR NON-AFRICAN AMERICAN: 44 ML/MIN/1.73
GLUCOSE BLD-MCNC: 74 MG/DL (ref 74–99)
HBA1C MFR BLD: 8 % (ref 4–5.6)
HCT VFR BLD CALC: 36.6 % (ref 34–48)
HDLC SERPL-MCNC: 61 MG/DL
HEMOGLOBIN: 11.7 G/DL (ref 11.5–15.5)
IMMATURE GRANULOCYTES #: 0.01 E9/L
IMMATURE GRANULOCYTES %: 0.2 % (ref 0–5)
LDL CHOLESTEROL CALCULATED: 136 MG/DL (ref 0–99)
LYMPHOCYTES ABSOLUTE: 1.37 E9/L (ref 1.5–4)
LYMPHOCYTES RELATIVE PERCENT: 23.5 % (ref 20–42)
MCH RBC QN AUTO: 31.3 PG (ref 26–35)
MCHC RBC AUTO-ENTMCNC: 32 % (ref 32–34.5)
MCV RBC AUTO: 97.9 FL (ref 80–99.9)
MICROALBUMIN UR-MCNC: 66.4 MG/L
MICROALBUMIN/CREAT UR-RTO: 62.1 (ref 0–30)
MONOCYTES ABSOLUTE: 0.38 E9/L (ref 0.1–0.95)
MONOCYTES RELATIVE PERCENT: 6.5 % (ref 2–12)
NEUTROPHILS ABSOLUTE: 3.92 E9/L (ref 1.8–7.3)
NEUTROPHILS RELATIVE PERCENT: 67.1 % (ref 43–80)
PDW BLD-RTO: 13.4 FL (ref 11.5–15)
PLATELET # BLD: 235 E9/L (ref 130–450)
PMV BLD AUTO: 10.2 FL (ref 7–12)
POTASSIUM SERPL-SCNC: 4.2 MMOL/L (ref 3.5–5)
RBC # BLD: 3.74 E12/L (ref 3.5–5.5)
SODIUM BLD-SCNC: 144 MMOL/L (ref 132–146)
TOTAL PROTEIN: 6.7 G/DL (ref 6.4–8.3)
TRIGL SERPL-MCNC: 101 MG/DL (ref 0–149)
TSH SERPL DL<=0.05 MIU/L-ACNC: 0.68 UIU/ML (ref 0.27–4.2)
VLDLC SERPL CALC-MCNC: 20 MG/DL
WBC # BLD: 5.8 E9/L (ref 4.5–11.5)

## 2022-08-30 PROCEDURE — 3017F COLORECTAL CA SCREEN DOC REV: CPT | Performed by: FAMILY MEDICINE

## 2022-08-30 PROCEDURE — 1123F ACP DISCUSS/DSCN MKR DOCD: CPT | Performed by: FAMILY MEDICINE

## 2022-08-30 PROCEDURE — 2022F DILAT RTA XM EVC RTNOPTHY: CPT | Performed by: FAMILY MEDICINE

## 2022-08-30 PROCEDURE — G8399 PT W/DXA RESULTS DOCUMENT: HCPCS | Performed by: FAMILY MEDICINE

## 2022-08-30 PROCEDURE — 3052F HG A1C>EQUAL 8.0%<EQUAL 9.0%: CPT | Performed by: FAMILY MEDICINE

## 2022-08-30 PROCEDURE — 99214 OFFICE O/P EST MOD 30 MIN: CPT | Performed by: FAMILY MEDICINE

## 2022-08-30 PROCEDURE — G8417 CALC BMI ABV UP PARAM F/U: HCPCS | Performed by: FAMILY MEDICINE

## 2022-08-30 PROCEDURE — 1036F TOBACCO NON-USER: CPT | Performed by: FAMILY MEDICINE

## 2022-08-30 PROCEDURE — G8427 DOCREV CUR MEDS BY ELIG CLIN: HCPCS | Performed by: FAMILY MEDICINE

## 2022-08-30 PROCEDURE — 1090F PRES/ABSN URINE INCON ASSESS: CPT | Performed by: FAMILY MEDICINE

## 2022-08-30 RX ORDER — MULTIVITAMIN WITH IRON
250 TABLET ORAL DAILY
COMMUNITY

## 2022-08-30 RX ORDER — TRAMADOL HYDROCHLORIDE 50 MG/1
50 TABLET ORAL 2 TIMES DAILY PRN
Qty: 40 TABLET | Refills: 2 | Status: SHIPPED | OUTPATIENT
Start: 2022-08-30 | End: 2022-09-29

## 2022-08-30 RX ORDER — AMLODIPINE BESYLATE 5 MG/1
5 TABLET ORAL DAILY
Qty: 90 TABLET | Refills: 1 | Status: SHIPPED | OUTPATIENT
Start: 2022-08-30

## 2022-08-30 SDOH — ECONOMIC STABILITY: FOOD INSECURITY: WITHIN THE PAST 12 MONTHS, THE FOOD YOU BOUGHT JUST DIDN'T LAST AND YOU DIDN'T HAVE MONEY TO GET MORE.: NEVER TRUE

## 2022-08-30 SDOH — ECONOMIC STABILITY: FOOD INSECURITY: WITHIN THE PAST 12 MONTHS, YOU WORRIED THAT YOUR FOOD WOULD RUN OUT BEFORE YOU GOT MONEY TO BUY MORE.: NEVER TRUE

## 2022-08-30 ASSESSMENT — ENCOUNTER SYMPTOMS
BACK PAIN: 1
ABDOMINAL PAIN: 0
CONSTIPATION: 0
EYES NEGATIVE: 1
EYE REDNESS: 0
WHEEZING: 0
DIARRHEA: 0
COUGH: 0
SHORTNESS OF BREATH: 0
CHEST TIGHTNESS: 0
VOMITING: 0
PHOTOPHOBIA: 0
BLOOD IN STOOL: 0

## 2022-08-30 ASSESSMENT — PATIENT HEALTH QUESTIONNAIRE - PHQ9
SUM OF ALL RESPONSES TO PHQ QUESTIONS 1-9: 0
1. LITTLE INTEREST OR PLEASURE IN DOING THINGS: 0
SUM OF ALL RESPONSES TO PHQ QUESTIONS 1-9: 0
SUM OF ALL RESPONSES TO PHQ9 QUESTIONS 1 & 2: 0
SUM OF ALL RESPONSES TO PHQ QUESTIONS 1-9: 0
2. FEELING DOWN, DEPRESSED OR HOPELESS: 0
SUM OF ALL RESPONSES TO PHQ QUESTIONS 1-9: 0

## 2022-08-30 ASSESSMENT — SOCIAL DETERMINANTS OF HEALTH (SDOH): HOW HARD IS IT FOR YOU TO PAY FOR THE VERY BASICS LIKE FOOD, HOUSING, MEDICAL CARE, AND HEATING?: NOT HARD AT ALL

## 2022-08-30 NOTE — PROGRESS NOTES
OFFICE NOTE    22  Name: Navarro Cameron  :1945   Sex:female   Age:76 y.o. SUBJECTIVE  Chief Complaint   Patient presents with    Foot Pain     Left foot     Insomnia     Trouble sleep at night        HPI reports some pain medial arch left foot. Is hoping will get shot in her back by Dr. Areli Mistry in about a week. Review of Systems   Constitutional:  Positive for fatigue. Negative for activity change, appetite change, fever and unexpected weight change. HENT:  Negative for congestion, ear pain, nosebleeds and postnasal drip. Eyes: Negative. Negative for photophobia, redness and visual disturbance. Respiratory:  Negative for cough, chest tightness, shortness of breath and wheezing. Cardiovascular:  Negative for chest pain, palpitations and leg swelling. Gastrointestinal:  Negative for abdominal pain, blood in stool, constipation, diarrhea and vomiting. Endocrine: Negative for cold intolerance, polydipsia and polyuria. Genitourinary:  Positive for frequency and urgency. Negative for dysuria, hematuria and vaginal bleeding. Musculoskeletal:  Positive for arthralgias and back pain. Negative for gait problem and joint swelling. Skin:  Negative for pallor, rash and wound. Allergic/Immunologic: Negative for environmental allergies and food allergies. Neurological:  Negative for dizziness, tremors, weakness and headaches. Hematological:  Negative for adenopathy. Does not bruise/bleed easily. Psychiatric/Behavioral:  Negative for behavioral problems, confusion, dysphoric mood and sleep disturbance. All other systems reviewed and are negative.          Current Outpatient Medications:     magnesium (MAGNESIUM-OXIDE) 250 MG TABS tablet, Take 250 mg by mouth daily, Disp: , Rfl:     amLODIPine (NORVASC) 5 MG tablet, Take 1 tablet by mouth daily, Disp: 90 tablet, Rfl: 1    diclofenac sodium (VOLTAREN) 1 % GEL, Apply 2 g topically 2 times daily, Disp: 120 g, Rfl: 5    glipiZIDE (GLUCOTROL) 5 MG tablet, Take 2 tablets by mouth 2 times daily, Disp: , Rfl:     metFORMIN (GLUCOPHAGE) 500 MG tablet, Take 2 tablets by mouth 2 times daily, Disp: , Rfl:     vitamin E 600 units capsule, Take 600 Units by mouth daily, Disp: , Rfl:     vitamin C (ASCORBIC ACID) 500 MG tablet, Take 500 mg by mouth daily, Disp: , Rfl:     Multiple Vitamins-Minerals (MULTIVITAMIN PO), Take by mouth, Disp: , Rfl:     Biotin 5000 MCG TABS, Take by mouth, Disp: , Rfl:     Omega-3 Fatty Acids (OMEGA 3 PO), Take by mouth, Disp: , Rfl:     pioglitazone (ACTOS) 45 MG tablet, Take 45 mg by mouth daily. , Disp: , Rfl:     enalapril (VASOTEC) 20 MG tablet, Take 20 mg by mouth 2 times daily. , Disp: , Rfl:     traMADol (ULTRAM) 50 MG tablet, Take 1 tablet by mouth 2 times daily as needed for Pain for up to 30 days. Intended supply: 5 days.  Take lowest dose possible to manage pain, Disp: 40 tablet, Rfl: 2    escitalopram (LEXAPRO) 10 MG tablet, Take 1 tablet by mouth daily (Patient not taking: Reported on 8/30/2022), Disp: 30 tablet, Rfl: 5    ibuprofen (ADVIL;MOTRIN) 800 MG tablet, Take 1 tablet by mouth daily as needed for Pain (Patient not taking: Reported on 8/30/2022), Disp: 60 tablet, Rfl: 5  No Known Allergies    Past Medical History:   Diagnosis Date    Arthritis     Carpal tunnel syndrome     Chronic back pain     Corns and callosities     Diabetes mellitus ([address]     Fibromyositis     Hyperlipidemia     Hypertension     Onychomycosis     Osteoarthritis     Osteoporosis     Peripheral vascular disease ([address]     Sciatica     Type 2 diabetes mellitus without complication (HCC)      Past Surgical History:   Procedure Laterality Date    ABDOMEN SURGERY      hysterectomy    BACK SURGERY      BACK SURGERY      3 back surgerys 3-4 year ago    BREAST BIOPSY Right     stereotactic    CARPAL TUNNEL RELEASE  2019    Left    HYSTERECTOMY (CERVIX STATUS UNKNOWN)      SHOULDER SURGERY      RTC, B    SPINAL FUSION      lumbar TONSILLECTOMY       No family history on file. Social History       Tobacco History       Smoking Status  Never      Smokeless Tobacco Use  Never              Alcohol History       Alcohol Use Status  No              Drug Use       Drug Use Status  No              Sexual Activity       Sexually Active  Not Currently Partners  Male Birth Control/Protection  Post-menopausal                    OBJECTIVE  Vitals:    08/30/22 1108   BP: 136/82   Pulse: 70   Resp: 18   Temp: 97.8 °F (36.6 °C)   TempSrc: Temporal   SpO2: 98%   Weight: 143 lb (64.9 kg)   Height: 5' 2\" (1.575 m)        Body mass index is 26.16 kg/m². Orders Placed This Encounter   Procedures    [facility] WIL DIGITAL SCREEN BILATERAL     Further imaging can be completed per [address] protocol     Standing Status:   Future     Standing Expiration Date:   10/30/2023    CBC with Auto Differential     Standing Status:   Future     Standing Expiration Date:   8/30/2023    Comprehensive Metabolic Panel     Standing Status:   Future     Standing Expiration Date:   8/30/2023    Lipid Panel     Standing Status:   Future     Standing Expiration Date:   8/30/2023    TSH     Standing Status:   Future     Standing Expiration Date:   8/30/2023    Hemoglobin A1C     Standing Status:   Future     Standing Expiration Date:   8/30/2023    Microalbumin / Creatinine Urine Ratio     Standing Status:   Future     Standing Expiration Date:   8/30/2023        EXAM   Physical Exam  Vitals and nursing note reviewed. Constitutional:       Appearance: Normal appearance. She is normal weight. HENT:      Right Ear: Tympanic membrane and external ear normal.      Left Ear: Tympanic membrane and external ear normal.      Nose: Congestion present. No rhinorrhea. Mouth/Throat:      Pharynx: Oropharynx is clear. No oropharyngeal exudate or posterior oropharyngeal erythema.    Eyes:      Conjunctiva/sclera: Conjunctivae normal.      Pupils: Pupils are equal, round, and reactive to light. Neck:      Vascular: No carotid bruit. Cardiovascular:      Rate and Rhythm: Normal rate and regular rhythm. Heart sounds: No murmur heard. Pulmonary:      Effort: Pulmonary effort is normal.      Breath sounds: Rales present. No wheezing. Abdominal:      General: Abdomen is flat. Palpations: There is no mass. Tenderness: There is no abdominal tenderness. There is no guarding. Hernia: No hernia is present. Musculoskeletal:         General: No swelling, tenderness, deformity or signs of injury. Cervical back: No tenderness. Lymphadenopathy:      Cervical: No cervical adenopathy. Skin:     Capillary Refill: Capillary refill takes less than 2 seconds. Coloration: Skin is not jaundiced or pale. Findings: No bruising or rash. Neurological:      General: No focal deficit present. Mental Status: She is alert and oriented to person, place, and time. Sensory: No sensory deficit. Motor: No weakness. Coordination: Coordination normal.      Gait: Gait normal.   Psychiatric:         Mood and Affect: Mood normal.         Behavior: Behavior normal.         Raegan Menjivar was seen today for foot pain and insomnia. Diagnoses and all orders for this visit:    At high risk for falls  Tripped over object on floor in shed and  over leg of chair in house. Discussed home safety  Primary hypertension  -     amLODIPine (NORVASC) 5 MG tablet; Take 1 tablet by mouth daily  -     CBC with Auto Differential; Future  -     Comprehensive Metabolic Panel; Future  Well controlled, no changes made  Lumbar degenerative disc disease  -     traMADol (ULTRAM) 50 MG tablet; Take 1 tablet by mouth 2 times daily as needed for Pain for up to 30 days. Intended supply: 5 days. Take lowest dose possible to manage pain  Works for her, can take 1 or 2 per day.  Use only topical NSSAID  Stage 3a chronic kidney disease ([facility]  -     CBC with Auto Differential; Future  -     Comprehensive Metabolic Panel; Future    Type 2 diabetes mellitus with stage 3 chronic kidney disease, without long-term current use of insulin, unspecified whether stage 3a or 3b CKD (HCC)  -     CBC with Auto Differential; Future  -     Comprehensive Metabolic Panel; Future  -     Lipid Panel; Future  -     TSH; Future  -     Hemoglobin A1C; Future  -     Microalbumin / Creatinine Urine Ratio; Future  A1C has come down nicely. Will continue to watch  Encounter for screening mammogram for breast cancer  -     [facility] WIL DIGITAL SCREEN BILATERAL; Future    Other orders  -     diclofenac sodium (VOLTAREN) 1 % GEL; Apply 2 g topically 2 times daily  Will try this on foot. May want to see Podiatrist for orthotic, will consider      No follow-ups on file.     Electronically signed by Brandy Fabian MD on 8/30/22 at 11:36 AM DAVIDT

## 2022-10-07 ENCOUNTER — OFFICE VISIT (OUTPATIENT)
Dept: PODIATRY | Age: 77
End: 2022-10-07
Payer: MEDICARE

## 2022-10-07 VITALS — BODY MASS INDEX: 26.13 KG/M2 | WEIGHT: 142 LBS | HEIGHT: 62 IN

## 2022-10-07 DIAGNOSIS — M79.672 LEFT FOOT PAIN: ICD-10-CM

## 2022-10-07 DIAGNOSIS — R60.0 LOCALIZED EDEMA: ICD-10-CM

## 2022-10-07 DIAGNOSIS — R26.2 DIFFICULTY WALKING: ICD-10-CM

## 2022-10-07 DIAGNOSIS — M19.072 ARTHRITIS OF LEFT FOOT: Primary | ICD-10-CM

## 2022-10-07 PROCEDURE — 1036F TOBACCO NON-USER: CPT | Performed by: PODIATRIST

## 2022-10-07 PROCEDURE — G8484 FLU IMMUNIZE NO ADMIN: HCPCS | Performed by: PODIATRIST

## 2022-10-07 PROCEDURE — G8417 CALC BMI ABV UP PARAM F/U: HCPCS | Performed by: PODIATRIST

## 2022-10-07 PROCEDURE — G8427 DOCREV CUR MEDS BY ELIG CLIN: HCPCS | Performed by: PODIATRIST

## 2022-10-07 PROCEDURE — 1090F PRES/ABSN URINE INCON ASSESS: CPT | Performed by: PODIATRIST

## 2022-10-07 PROCEDURE — 1123F ACP DISCUSS/DSCN MKR DOCD: CPT | Performed by: PODIATRIST

## 2022-10-07 PROCEDURE — 99213 OFFICE O/P EST LOW 20 MIN: CPT | Performed by: PODIATRIST

## 2022-10-07 PROCEDURE — 3017F COLORECTAL CA SCREEN DOC REV: CPT | Performed by: PODIATRIST

## 2022-10-07 PROCEDURE — G8399 PT W/DXA RESULTS DOCUMENT: HCPCS | Performed by: PODIATRIST

## 2022-10-07 NOTE — PROGRESS NOTES
Patient is in today for left foot pain. Patient says she is having a flare up with plantar fasciitis. Patient says she fell recently a couple times. Patient says when she first wakes up in the morning is when it really hurts.  Pcp is Dwight Sam MD  Last ov 9/7/22

## 2022-10-07 NOTE — PROGRESS NOTES
10/7/22     Render [facility]    : 1945   Sex: female    Age: 78 y.o. Patient's PCP/Provider is:  Ludwig Pritchard MD    Subjective:  Patient is seen today for follow-up regarding continued evaluation regarding pain and swelling into her left midfoot region. She noticed the issue about 7 to 10 days ago. Is been having issues upon awakening in the morning and after periods of rest.  No recent injuries or changes in activities noted. Chief Complaint   Patient presents with    Foot Pain     Left foot     Nail Problem     Nail care       ROS:  Const: Positives and pertinent negatives as per HPI. Musculo: Denies symptoms other than stated above. Neuro: Denies symptoms other than stated above. Skin: Denies symptoms other than stated above. Current Medications:    Current Outpatient Medications:     magnesium (MAGNESIUM-OXIDE) 250 MG TABS tablet, Take 250 mg by mouth daily, Disp: , Rfl:     amLODIPine (NORVASC) 5 MG tablet, Take 1 tablet by mouth daily, Disp: 90 tablet, Rfl: 1    diclofenac sodium (VOLTAREN) 1 % GEL, Apply 2 g topically 2 times daily, Disp: 120 g, Rfl: 5    ibuprofen (ADVIL;MOTRIN) 800 MG tablet, Take 1 tablet by mouth daily as needed for Pain, Disp: 60 tablet, Rfl: 5    glipiZIDE (GLUCOTROL) 5 MG tablet, Take 2 tablets by mouth 2 times daily, Disp: , Rfl:     metFORMIN (GLUCOPHAGE) 500 MG tablet, Take 2 tablets by mouth 2 times daily, Disp: , Rfl:     vitamin E 600 units capsule, Take 600 Units by mouth daily, Disp: , Rfl:     vitamin C (ASCORBIC ACID) 500 MG tablet, Take 500 mg by mouth daily, Disp: , Rfl:     Multiple Vitamins-Minerals (MULTIVITAMIN PO), Take by mouth, Disp: , Rfl:     Biotin 5000 MCG TABS, Take by mouth, Disp: , Rfl:     Omega-3 Fatty Acids (OMEGA 3 PO), Take by mouth, Disp: , Rfl:     pioglitazone (ACTOS) 45 MG tablet, Take 45 mg by mouth daily. , Disp: , Rfl:     enalapril (VASOTEC) 20 MG tablet, Take 20 mg by mouth 2 times daily. , Disp: , Rfl:     escitalopram (LEXAPRO) 10 MG tablet, Take 1 tablet by mouth daily (Patient not taking: No sig reported), Disp: 30 tablet, Rfl: 5    Allergies:  No Known Allergies    Vitals:    10/07/22 0822   Weight: 142 lb (64.4 kg)   Height: 5' 2\" (1.575 m)       Exam:  Neurovascular status unchanged. Tenderness noted into the dorsal midfoot region left lower extremity. Limited range of motion left ankle and subtalar joint secondary to edema present and symptoms. Antalgic gait noted upon evaluation. No plantar calluses, ulcerations, heel fissuring noted left foot. Diagnostic Studies:     No results found. Procedures: An unna boot  compressive wrap was applied to the left lower extremity. It's purpose is to  decrease  the amount of edema present, and to allow proper healing of the soft tissues. The patient was instructed to keep the unna boot clean, dry and intact until the next follow up visit. The patient was instructed to look for signs of redness, irritation, blistering and pain. If these or any other symptoms were to develop, they were advised to contact the office immediately for reevaluation. Plan Per Assessment  Christ Fox was seen today for foot pain and nail problem. Diagnoses and all orders for this visit:    Arthritis of left foot    Localized edema    Left foot pain  -     XR FOOT LEFT (MIN 3 VIEWS); Future    Difficulty walking      Evaluation and management  Radiographs were ordered and reviewed with patient today. No acute osseous abnormalities noted. Compression dressing applied symptomatic left lower extremity to reduce current edema and inflammation. She was given information on purchasing OTC compression socks as well. Did discuss additional use of her good supportive shoe gear with every day ambulatory activities. Patient will be followed up at a later date for continued evaluation and management.       Seen By:    Sudheer Segura DPM    Electronically signed by Sudheer Segura DPM on 10/7/2022 at 8:45 AM    This note was created using voice recognition software. The note was reviewed however may contain grammatical errors.

## 2022-10-10 ENCOUNTER — TELEPHONE (OUTPATIENT)
Dept: PODIATRY | Age: 77
End: 2022-10-10

## 2022-10-10 DIAGNOSIS — M79.672 LEFT FOOT PAIN: ICD-10-CM

## 2022-10-10 DIAGNOSIS — M19.072 ARTHRITIS OF LEFT FOOT: Primary | ICD-10-CM

## 2022-10-10 NOTE — TELEPHONE ENCOUNTER
Patient called and states a script is suppose to be sent to [facility] in [place] for arthritis pain. Patient was seen 10/7/2022. Please advise.

## 2022-10-10 NOTE — TELEPHONE ENCOUNTER
Patient notified Voltaren gel sent to pharmacy. Patient seemed unaware of CKD. Patient was notified her pcp has placed the dx in her chart to further talk to her pcp office about dx. Patient was understanding to use Voltaren gel.

## 2022-10-11 ENCOUNTER — TELEPHONE (OUTPATIENT)
Dept: PODIATRY | Age: 77
End: 2022-10-11

## 2022-10-11 NOTE — TELEPHONE ENCOUNTER
Patient called back and states medication was not at her pharmacy. I informed patient the she has script already, that  sent in the medication. Patient understood, and she states, it doesn't help. I informed her we can not send in anything stronger due to Dx. I advised her to reach back out to her pcp for pain control.

## 2022-10-21 ENCOUNTER — OFFICE VISIT (OUTPATIENT)
Dept: PODIATRY | Age: 77
End: 2022-10-21
Payer: MEDICARE

## 2022-10-21 VITALS — BODY MASS INDEX: 26.13 KG/M2 | WEIGHT: 142 LBS | HEIGHT: 62 IN

## 2022-10-21 DIAGNOSIS — R26.2 DIFFICULTY WALKING: ICD-10-CM

## 2022-10-21 DIAGNOSIS — R60.0 LOCALIZED EDEMA: ICD-10-CM

## 2022-10-21 DIAGNOSIS — M19.072 ARTHRITIS OF LEFT FOOT: Primary | ICD-10-CM

## 2022-10-21 PROCEDURE — G8399 PT W/DXA RESULTS DOCUMENT: HCPCS | Performed by: PODIATRIST

## 2022-10-21 PROCEDURE — 1090F PRES/ABSN URINE INCON ASSESS: CPT | Performed by: PODIATRIST

## 2022-10-21 PROCEDURE — 99213 OFFICE O/P EST LOW 20 MIN: CPT | Performed by: PODIATRIST

## 2022-10-21 PROCEDURE — 1123F ACP DISCUSS/DSCN MKR DOCD: CPT | Performed by: PODIATRIST

## 2022-10-21 PROCEDURE — 3017F COLORECTAL CA SCREEN DOC REV: CPT | Performed by: PODIATRIST

## 2022-10-21 PROCEDURE — G8484 FLU IMMUNIZE NO ADMIN: HCPCS | Performed by: PODIATRIST

## 2022-10-21 PROCEDURE — G8427 DOCREV CUR MEDS BY ELIG CLIN: HCPCS | Performed by: PODIATRIST

## 2022-10-21 PROCEDURE — G8417 CALC BMI ABV UP PARAM F/U: HCPCS | Performed by: PODIATRIST

## 2022-10-21 PROCEDURE — 1036F TOBACCO NON-USER: CPT | Performed by: PODIATRIST

## 2022-10-21 NOTE — PROGRESS NOTES
Patient is here for follow up left foot. Patient states she left unna boot on for 3/4 days. Patient states improvement with compression.  Herminia Castillo MD  Electronically signed by Kyle Dugan LPN on 56/15/8368 at 7:01 AM

## 2022-10-21 NOTE — PROGRESS NOTES
10/21/22     Rosmery Guerrero    : 1945   Sex: female    Age: 78 y.o. Patient's PCP/Provider is:  Rubia Ponce MD    Subjective:  Patient is seen today for follow-up regarding continued arthritic issues associated tendinitis issues left lower extremity. Patient has noticed improvement with the compression dressing applied last visit. She did not  the compression sock as recommended on last visit. No changes in activities recently. No other additional abnormalities noted. Chief Complaint   Patient presents with    Follow-up     Left foot        ROS:  Const: Positives and pertinent negatives as per HPI. Musculo: Denies symptoms other than stated above. Neuro: Denies symptoms other than stated above. Skin: Denies symptoms other than stated above. Current Medications:    Current Outpatient Medications:     magnesium (MAGNESIUM-OXIDE) 250 MG TABS tablet, Take 250 mg by mouth daily, Disp: , Rfl:     amLODIPine (NORVASC) 5 MG tablet, Take 1 tablet by mouth daily, Disp: 90 tablet, Rfl: 1    diclofenac sodium (VOLTAREN) 1 % GEL, Apply 2 g topically 2 times daily, Disp: 120 g, Rfl: 5    escitalopram (LEXAPRO) 10 MG tablet, Take 1 tablet by mouth daily, Disp: 30 tablet, Rfl: 5    ibuprofen (ADVIL;MOTRIN) 800 MG tablet, Take 1 tablet by mouth daily as needed for Pain, Disp: 60 tablet, Rfl: 5    glipiZIDE (GLUCOTROL) 5 MG tablet, Take 2 tablets by mouth 2 times daily, Disp: , Rfl:     metFORMIN (GLUCOPHAGE) 500 MG tablet, Take 2 tablets by mouth 2 times daily, Disp: , Rfl:     vitamin E 600 units capsule, Take 600 Units by mouth daily, Disp: , Rfl:     vitamin C (ASCORBIC ACID) 500 MG tablet, Take 500 mg by mouth daily, Disp: , Rfl:     Multiple Vitamins-Minerals (MULTIVITAMIN PO), Take by mouth, Disp: , Rfl:     Biotin 5000 MCG TABS, Take by mouth, Disp: , Rfl:     Omega-3 Fatty Acids (OMEGA 3 PO), Take by mouth, Disp: , Rfl:     pioglitazone (ACTOS) 45 MG tablet, Take 45 mg by mouth daily. , Disp: , Rfl:     enalapril (VASOTEC) 20 MG tablet, Take 20 mg by mouth 2 times daily. , Disp: , Rfl:     Allergies:  No Known Allergies    Vitals:    10/21/22 0659   Weight: 142 lb (64.4 kg)   Height: 5' 2\" (1.575 m)       Exam:  Neurovascular status unchanged. Tenderness noted along the dorsal left midfoot region with range of motion and muscle testing performed. Edematous changes stable left lower extremity. No plantar calluses or heel fissuring noted left foot. Mild antalgic gait noted upon evaluation. Diagnostic Studies:     XR FOOT LEFT (MIN 3 VIEWS)    Result Date: 10/7/2022  EXAMINATION: THREE XRAY VIEWS OF THE LEFT FOOT 10/7/2022 8:09 am COMPARISON: None. HISTORY: ORDERING SYSTEM PROVIDED HISTORY: Left foot pain TECHNOLOGIST PROVIDED HISTORY: Standing unless patient unable to stand FINDINGS: Osteopenia is noted. No acute fracture or dislocation is identified. Arthritic changes are seen at the tarsal-metatarsal junction. There is plantar calcaneal spurring. There is hammer toe deformity of the 2nd digit. No bony erosion or destruction is visualized. No significant soft tissue swelling is noted. Chronic findings as described. No acute bony abnormality. Procedures:    None    Plan Per Assessment  Angelina Huitron was seen today for follow-up. Diagnoses and all orders for this visit:    Arthritis of left foot    Localized edema    Difficulty walking      Evaluation and management  We did discuss appropriate compression therapy techniques to the left lower extremity to reduce residual inflammation. Was stressed the importance of purchasing a compression sock to be worn on her left lower extremity with her every day ambulatory activities. Patient will be followed up at a later date when she does get [place]. She was advised to call the office with any questions or concerns in the interim.       Seen By:    Dylan Mayfield DPM    Electronically signed by Benjamin Duggan Jadon Flores DPM on 10/21/2022 at 7:23 AM    This note was created using voice recognition software. The note was reviewed however may contain grammatical errors.

## 2022-12-16 ENCOUNTER — PROCEDURE VISIT (OUTPATIENT)
Dept: PODIATRY | Age: 77
End: 2022-12-16

## 2022-12-16 VITALS — WEIGHT: 140 LBS | HEIGHT: 62 IN | BODY MASS INDEX: 25.76 KG/M2

## 2022-12-16 DIAGNOSIS — I73.9 PVD (PERIPHERAL VASCULAR DISEASE) (HCC): ICD-10-CM

## 2022-12-16 DIAGNOSIS — E11.51 TYPE II DIABETES MELLITUS WITH PERIPHERAL CIRCULATORY DISORDER (HCC): ICD-10-CM

## 2022-12-16 DIAGNOSIS — M79.675 PAIN OF TOE OF LEFT FOOT: ICD-10-CM

## 2022-12-16 DIAGNOSIS — M79.674 PAIN OF TOE OF RIGHT FOOT: ICD-10-CM

## 2022-12-16 DIAGNOSIS — R26.2 DIFFICULTY WALKING: ICD-10-CM

## 2022-12-16 DIAGNOSIS — B35.1 ONYCHOMYCOSIS: Primary | ICD-10-CM

## 2022-12-16 RX ORDER — TRAMADOL HYDROCHLORIDE 50 MG/1
TABLET ORAL
COMMUNITY
Start: 2022-12-07

## 2022-12-16 NOTE — PROGRESS NOTES
RELEASE  2019    Left    HYSTERECTOMY (CERVIX STATUS UNKNOWN)      SHOULDER SURGERY      RTC, B    SPINAL FUSION      lumbar    TONSILLECTOMY       Past Medical History:   Diagnosis Date    Arthritis     Carpal tunnel syndrome     Chronic back pain     Corns and callosities     Diabetes mellitus ([address]     Fibromyositis     Hyperlipidemia     Hypertension     Onychomycosis     Osteoarthritis     Osteoporosis     Peripheral vascular disease ([address]     Sciatica     Type 2 diabetes mellitus without complication ([address]        Vitals:    12/16/22 0859   Weight: 140 lb (63.5 kg)   Height: 5' 2\" (1.575 m)       Exam:  Pedal pulses diminished to palpation bilateral foot. At this time the nail/s 1, 2, 5 right foot and nail/s 1, 2, 5 left foot are noted to be thickened, dystrophic and discolored with subungual debris present. Tenderness noted to palpation. Minimal hair growth is noted to both lower extremities. Edema noted with both varicosities and stasis skin changes present bilaterally. Coolness is noted to the digital regions to palpation. Capillary fill time delayed digital areas bilateral foot. No heel fissuring or macerations of the web spaces. No plantar calluses and/or ulcerative areas are noted. Patient is having difficulty with gait/walking. Plan Per Assessment  May Rodriguez was seen today for nail problem and foot pain. Diagnoses and all orders for this visit:    Onychomycosis    Pain of toe of right foot    Pain of toe of left foot    PVD (peripheral vascular disease) ([address]    Type II diabetes mellitus with peripheral circulatory disorder (HCC)    Difficulty walking        1. Evaluation and Management  2. Manual and electrical debridement of the mycotic nails was performed for thickness and length to prevent injection and/or ulceration. 3. Discussed additional diabetic lower extremity care techniques with patient today.   4. It was discussed in detail with the patient proper caring for the vascular compromised foot. The fact that they have compromised blood flow put the patient at risk for infection/gangrene/amputation. The patient should not walk barefoot. Shoe gear should fit properly and socks should be worn with shoes. If any skin lesions are noted, they are instructed to contact the office immediately. 5. We will see the patient back at a later date for continued podiatric management and care. Patient was advised to call the office with any questions or concerns prior to their next appointment if needed. Seen By:    Kasi Steele DPM    Electronically signed by Kasi Steele DPM on 12/16/2022 at 9:32 AM      This note was created using voice recognition software. The note was reviewed however may contain grammatical errors.

## 2022-12-16 NOTE — PROGRESS NOTES
Patient in today for nail care. Patient does not have any complaints of pain at this time.  Patient's PCP is Alhaji Ferguson MD date of last ov 8/30/22        Lisa Haywood LPN

## 2022-12-21 ENCOUNTER — HOSPITAL ENCOUNTER (OUTPATIENT)
Dept: GENERAL RADIOLOGY | Age: 77
Discharge: HOME | End: 2022-12-23
Payer: MEDICARE

## 2022-12-21 DIAGNOSIS — Z12.31 ENCOUNTER FOR SCREENING MAMMOGRAM FOR BREAST CANCER: ICD-10-CM

## 2022-12-21 PROCEDURE — 77063 BREAST TOMOSYNTHESIS BI: CPT

## 2022-12-26 ENCOUNTER — HOSPITAL ENCOUNTER (OUTPATIENT)
Dept: ULTRASOUND IMAGING | Age: 77
Discharge: HOME | End: 2022-12-28

## 2022-12-26 DIAGNOSIS — I73.9 PVD (PERIPHERAL VASCULAR DISEASE) (HCC): ICD-10-CM

## 2022-12-26 DIAGNOSIS — E11.51 TYPE II DIABETES MELLITUS WITH PERIPHERAL CIRCULATORY DISORDER (HCC): ICD-10-CM

## 2022-12-28 ENCOUNTER — HOSPITAL ENCOUNTER (OUTPATIENT)
Dept: INTERVENTIONAL RADIOLOGY/VASCULAR | Age: 77
Discharge: HOME | End: 2022-12-30
Payer: MEDICARE

## 2022-12-28 PROCEDURE — 93923 UPR/LXTR ART STDY 3+ LVLS: CPT

## 2023-01-13 ENCOUNTER — OFFICE VISIT (OUTPATIENT)
Dept: PODIATRY | Age: 78
End: 2023-01-13

## 2023-01-13 VITALS — HEIGHT: 62 IN | WEIGHT: 140 LBS | BODY MASS INDEX: 25.76 KG/M2

## 2023-01-13 DIAGNOSIS — R26.2 DIFFICULTY WALKING: ICD-10-CM

## 2023-01-13 DIAGNOSIS — M19.072 ARTHRITIS OF LEFT FOOT: Primary | ICD-10-CM

## 2023-01-13 DIAGNOSIS — M79.672 PAIN IN LEFT FOOT: ICD-10-CM

## 2023-01-13 DIAGNOSIS — R60.0 LOCALIZED EDEMA: ICD-10-CM

## 2023-01-13 NOTE — PROGRESS NOTES
Patient is here today to review results of a vascular study. She complains of continuous, nagging pain to the top of her left foot. PCP is Dr. Talon Brandon, last seen 08/30/2022.

## 2023-01-13 NOTE — PROGRESS NOTES
23     Rubia Onofre    : 1945   Sex: female    Age: 78 y.o. Patient's PCP/Provider is:  Michelle Mireles MD    Subjective:  Patient is seen today for follow-up regarding continued evaluation regarding chronic arthritic issues and tendinitis issues left lower extremity. She presents to discuss vascular results which were ordered due to chronic pain into both lower extremities. No other additional abnormalities noted at this time. Chief Complaint   Patient presents with    Foot Pain     Left foot pain       ROS:  Const: Positives and pertinent negatives as per HPI. Musculo: Denies symptoms other than stated above. Neuro: Denies symptoms other than stated above. Skin: Denies symptoms other than stated above.     Current Medications:    Current Outpatient Medications:     traMADol (ULTRAM) 50 MG tablet, TAKE 1 TABLET BY MOUTH EVERY 4 TO 6 HOURS AS NEEDED FOR PAIN, Disp: , Rfl:     magnesium (MAGNESIUM-OXIDE) 250 MG TABS tablet, Take 250 mg by mouth daily, Disp: , Rfl:     amLODIPine (NORVASC) 5 MG tablet, Take 1 tablet by mouth daily, Disp: 90 tablet, Rfl: 1    diclofenac sodium (VOLTAREN) 1 % GEL, Apply 2 g topically 2 times daily, Disp: 120 g, Rfl: 5    escitalopram (LEXAPRO) 10 MG tablet, Take 1 tablet by mouth daily, Disp: 30 tablet, Rfl: 5    ibuprofen (ADVIL;MOTRIN) 800 MG tablet, Take 1 tablet by mouth daily as needed for Pain, Disp: 60 tablet, Rfl: 5    glipiZIDE (GLUCOTROL) 5 MG tablet, Take 2 tablets by mouth 2 times daily, Disp: , Rfl:     metFORMIN (GLUCOPHAGE) 500 MG tablet, Take 2 tablets by mouth 2 times daily, Disp: , Rfl:     vitamin E 600 units capsule, Take 600 Units by mouth daily, Disp: , Rfl:     vitamin C (ASCORBIC ACID) 500 MG tablet, Take 500 mg by mouth daily, Disp: , Rfl:     Multiple Vitamins-Minerals (MULTIVITAMIN PO), Take by mouth, Disp: , Rfl:     Biotin 5000 MCG TABS, Take by mouth, Disp: , Rfl:     Omega-3 Fatty Acids (OMEGA 3 PO), Take by mouth, Disp: , Rfl: pioglitazone (ACTOS) 45 MG tablet, Take 45 mg by mouth daily. , Disp: , Rfl:     enalapril (VASOTEC) 20 MG tablet, Take 20 mg by mouth 2 times daily. , Disp: , Rfl:     Allergies:  No Known Allergies    Vitals:    01/13/23 1029   Weight: 140 lb (63.5 kg)   Height: 5' 2\" (1.575 m)       Exam:  Neurovascular status unchanged. Tenderness noted into the plantar arch and heel region with range of motion and muscle testing performed. Edematous issues noted left lower extremity without ecchymotic skin changes present. Antalgic gait noted upon evaluation. Diagnostic Studies:     VL LOWER EXTREMITY ARTERIAL SEGMENTAL PRESSURES W PPG    Result Date: 12/29/2022  EXAMINATION: LOWER ARTERIAL DUPLEX BILATERAL WITH PVR AND SEGMENTAL PRESSURE 12/28/2022 11:14 am TECHNIQUE: Arterial duplex LISSETT ultrasound. Segmental pressures and PVR performed with Doppler. COMPARISON: None. HISTORY: ORDERING SYSTEM PROVIDED HISTORY: PVD (peripheral vascular disease) ([facility] [address] TECHNOLOGIST PROVIDED HISTORY: Reason for exam:->Peripheral vascular disease What reading provider will be dictating this exam?->CRC FINDINGS: Elevated segmental pressures bilaterally. The LISSETT on the right is 1.0. The LISSETT on the left is 1.1. Right lower extremity: Arterial Doppler evaluation from the femoral artery through to the dorsalis pedis artery demonstrates triphasic waveforms throughout. PVR evaluation from the thigh to the metatarsal demonstrates preserved pulsatility. Mild decreased amplitudes in the distal aspect. Digital waveform evaluation of the digits of the right foot demonstrate preserved pulsatility and preserved amplitudes. Left lower extremity: Arterial Doppler evaluation from the femoral artery through to the dorsalis pedis artery demonstrates triphasic waveforms throughout. PVR evaluation from the thigh to the metatarsal demonstrates preserved pulsatility. Mild decreased amplitudes in the distal aspect.  Digital waveform evaluation of the digits of the left foot demonstrate preserved pulsatility and preserved amplitudes. 1.  Ankle brachial indices are normal bilaterally at 1.0 on the right and 1.1 on the left. Preserved multiphasic waveforms at all levels bilaterally on arterial Doppler evaluation lower suspicion for the presence of a high-grade stenosis. 2.  PVR evaluation of the bilateral lower extremity shows preserved pulsatility with mildly decreased amplitudes in the distal aspect. Digital waveform evaluation of the digits of the bilateral feet demonstrate preserved pulsatility and relatively preserved amplitudes bilaterally. There appears to be good flow to the digits of the bilateral feet at this time. Procedures: An unna boot  compressive wrap was applied to the left lower extremity. It's purpose is to  decrease  the amount of edema present, and to allow proper healing of the soft tissues. The patient was instructed to keep the unna boot clean, dry and intact until the next follow up visit. The patient was instructed to look for signs of redness, irritation, blistering and pain. If these or any other symptoms were to develop, they were advised to contact the office immediately for reevaluation. Plan Per Assessment  Sheila Odom was seen today for foot pain. Diagnoses and all orders for this visit:    Arthritis of left foot    Localized edema    Pain in left foot    Difficulty walking      Evaluation and management  Compression dressing applied symptomatic left lower extremity in efforts to reduce swelling and inflammation. Vascular studies were discussed with patient in detail today as well. Patient was also dispensed an offloading shoe at this time to help immobilize and reduce symptoms left lower extremity. The patient was dispensed a postop shoe. It is medically necessary and within the standard of care for the patient's diagnosis. Its purpose is to control the motion of the foot and to allow a decrease in inflammation.  The patient was instructed on its proper application and use. The patient was also instructed to watch for areas of rubbing, irritation, blister formation, or any other signs of abnormal pressure. If this occurs, the patient is to contact the office immediately. The ABN was reviewed and signed by the patient prior to leaving this appointment. Did recommend patient bring in her current orthotics for review next visit. She does continue taking the Tylenol arthritis which she is taking daily to reduce symptoms as well. Will be followed up at a later date for continued evaluation and care, until issues are improved. Seen By:    Tha Castillo DPM    Electronically signed by Tha Castillo DPM on 1/13/2023 at 10:52 AM    This note was created using voice recognition software. The note was reviewed however may contain grammatical errors.

## 2023-01-20 ENCOUNTER — OFFICE VISIT (OUTPATIENT)
Dept: PODIATRY | Age: 78
End: 2023-01-20
Payer: MEDICARE

## 2023-01-20 VITALS — HEIGHT: 62 IN | WEIGHT: 140 LBS | BODY MASS INDEX: 25.76 KG/M2

## 2023-01-20 DIAGNOSIS — M79.672 PAIN IN LEFT FOOT: ICD-10-CM

## 2023-01-20 DIAGNOSIS — M19.072 ARTHRITIS OF LEFT FOOT: Primary | ICD-10-CM

## 2023-01-20 DIAGNOSIS — M77.52 TENDINITIS OF LEFT FOOT: ICD-10-CM

## 2023-01-20 DIAGNOSIS — R26.2 DIFFICULTY WALKING: ICD-10-CM

## 2023-01-20 DIAGNOSIS — S99.922A INJURY OF LEFT FOOT, INITIAL ENCOUNTER: ICD-10-CM

## 2023-01-20 PROCEDURE — G8417 CALC BMI ABV UP PARAM F/U: HCPCS | Performed by: PODIATRIST

## 2023-01-20 PROCEDURE — 1123F ACP DISCUSS/DSCN MKR DOCD: CPT | Performed by: PODIATRIST

## 2023-01-20 PROCEDURE — 1036F TOBACCO NON-USER: CPT | Performed by: PODIATRIST

## 2023-01-20 PROCEDURE — G8399 PT W/DXA RESULTS DOCUMENT: HCPCS | Performed by: PODIATRIST

## 2023-01-20 PROCEDURE — 99213 OFFICE O/P EST LOW 20 MIN: CPT | Performed by: PODIATRIST

## 2023-01-20 PROCEDURE — G8427 DOCREV CUR MEDS BY ELIG CLIN: HCPCS | Performed by: PODIATRIST

## 2023-01-20 PROCEDURE — G8484 FLU IMMUNIZE NO ADMIN: HCPCS | Performed by: PODIATRIST

## 2023-01-20 PROCEDURE — 1090F PRES/ABSN URINE INCON ASSESS: CPT | Performed by: PODIATRIST

## 2023-01-20 PROCEDURE — 3017F COLORECTAL CA SCREEN DOC REV: CPT | Performed by: PODIATRIST

## 2023-01-20 NOTE — PROGRESS NOTES
Patient is here today for a one week follow up of an unna boot to the left foot. Patient was instructed to bring her old orthotics to her visit today, however she states she forgot to bring them. She continues to wear her post op shoe. She continues to have a \"pulling\" sensation or stretching to the top of the foot. PCP is Dr. Vee Quick, last seen 08/30/2022.

## 2023-01-20 NOTE — PROGRESS NOTES
23     Tessa Alejandro    : 1945   Sex: female    Age: 78 y.o. Patient's PCP/Provider is:  Vivi Pascual MD    Subjective:  Patient is seen today for follow-up regarding continued evaluation regarding chronic pain and swelling into her left midfoot region. Patient did have the injury several months ago into the left foot. She did reinjure it when she was on vacation 2 weeks ago. She stated the offloading shoe only helped mildly and the compression dressing did not help alleviate her symptoms. She is still using oral anti-inflammatories, icing, elevating, and other topical NSAIDs without improvement noted. Patient did want to discuss other treatment options available at this time. Chief Complaint   Patient presents with    Follow-up    Foot Pain     Left foot pain       ROS:  Const: Positives and pertinent negatives as per HPI. Musculo: Denies symptoms other than stated above. Neuro: Denies symptoms other than stated above. Skin: Denies symptoms other than stated above.     Current Medications:    Current Outpatient Medications:     traMADol (ULTRAM) 50 MG tablet, TAKE 1 TABLET BY MOUTH EVERY 4 TO 6 HOURS AS NEEDED FOR PAIN, Disp: , Rfl:     magnesium (MAGNESIUM-OXIDE) 250 MG TABS tablet, Take 250 mg by mouth daily, Disp: , Rfl:     amLODIPine (NORVASC) 5 MG tablet, Take 1 tablet by mouth daily, Disp: 90 tablet, Rfl: 1    diclofenac sodium (VOLTAREN) 1 % GEL, Apply 2 g topically 2 times daily, Disp: 120 g, Rfl: 5    escitalopram (LEXAPRO) 10 MG tablet, Take 1 tablet by mouth daily, Disp: 30 tablet, Rfl: 5    ibuprofen (ADVIL;MOTRIN) 800 MG tablet, Take 1 tablet by mouth daily as needed for Pain, Disp: 60 tablet, Rfl: 5    glipiZIDE (GLUCOTROL) 5 MG tablet, Take 2 tablets by mouth 2 times daily, Disp: , Rfl:     metFORMIN (GLUCOPHAGE) 500 MG tablet, Take 2 tablets by mouth 2 times daily, Disp: , Rfl:     vitamin E 600 units capsule, Take 600 Units by mouth daily, Disp: , Rfl:     vitamin C (ASCORBIC ACID) 500 MG tablet, Take 500 mg by mouth daily, Disp: , Rfl:     Multiple Vitamins-Minerals (MULTIVITAMIN PO), Take by mouth, Disp: , Rfl:     Biotin 5000 MCG TABS, Take by mouth, Disp: , Rfl:     Omega-3 Fatty Acids (OMEGA 3 PO), Take by mouth, Disp: , Rfl:     pioglitazone (ACTOS) 45 MG tablet, Take 45 mg by mouth daily. , Disp: , Rfl:     enalapril (VASOTEC) 20 MG tablet, Take 20 mg by mouth 2 times daily. , Disp: , Rfl:     Allergies:  No Known Allergies    Vitals:    01/20/23 0731   Weight: 140 lb (63.5 kg)   Height: 5' 2\" (1.575 m)       Exam:  Neurovascular status unchanged. Tenderness noted into the dorsal left midfoot region with attempted range of motion and muscle testing performed. Residual edema noted left midfoot region. Antalgic gait noted upon evaluation left lower extremity. Diagnostic Studies:     VL LOWER EXTREMITY ARTERIAL SEGMENTAL PRESSURES W PPG    Result Date: 12/29/2022  EXAMINATION: LOWER ARTERIAL DUPLEX BILATERAL WITH PVR AND SEGMENTAL PRESSURE 12/28/2022 11:14 am TECHNIQUE: Arterial duplex LISSETT ultrasound. Segmental pressures and PVR performed with Doppler. COMPARISON: None. HISTORY: ORDERING SYSTEM PROVIDED HISTORY: PVD (peripheral vascular disease) ([facility] [address] TECHNOLOGIST PROVIDED HISTORY: Reason for exam:->Peripheral vascular disease What reading provider will be dictating this exam?->CRC FINDINGS: Elevated segmental pressures bilaterally. The LISSETT on the right is 1.0. The LISSETT on the left is 1.1. Right lower extremity: Arterial Doppler evaluation from the femoral artery through to the dorsalis pedis artery demonstrates triphasic waveforms throughout. PVR evaluation from the thigh to the metatarsal demonstrates preserved pulsatility. Mild decreased amplitudes in the distal aspect. Digital waveform evaluation of the digits of the right foot demonstrate preserved pulsatility and preserved amplitudes.  Left lower extremity: Arterial Doppler evaluation from the femoral artery through to the dorsalis pedis artery demonstrates triphasic waveforms throughout. PVR evaluation from the thigh to the metatarsal demonstrates preserved pulsatility. Mild decreased amplitudes in the distal aspect. Digital waveform evaluation of the digits of the left foot demonstrate preserved pulsatility and preserved amplitudes. 1.  Ankle brachial indices are normal bilaterally at 1.0 on the right and 1.1 on the left. Preserved multiphasic waveforms at all levels bilaterally on arterial Doppler evaluation lower suspicion for the presence of a high-grade stenosis. 2.  PVR evaluation of the bilateral lower extremity shows preserved pulsatility with mildly decreased amplitudes in the distal aspect. Digital waveform evaluation of the digits of the bilateral feet demonstrate preserved pulsatility and relatively preserved amplitudes bilaterally. There appears to be good flow to the digits of the bilateral feet at this time. Procedures:    None    Plan Per Assessment  Ruben Gamez was seen today for follow-up and foot pain. Diagnoses and all orders for this visit:    Arthritis of left foot    Injury of left foot, initial encounter    Tendinitis of left foot    Pain in left foot    Difficulty walking      Evaluation and management  We did discuss getting patient MRI studies left lower extremity to assess for possible underlying tendon injury and/or ligamentous injuries into the midfoot region left. She is to continue wearing her offloading shoe at this time and limiting her daily activities due to the current symptoms. Patient will be followed up once the MRI is performed and reviewed, we will discuss further treatment options available at that time. Seen By:    Joy Edward DPM    Electronically signed by Joy Edward DPM on 1/20/2023 at 7:51 AM    This note was created using voice recognition software. The note was reviewed however may contain grammatical errors.

## 2023-02-10 ENCOUNTER — OFFICE VISIT (OUTPATIENT)
Dept: PODIATRY | Age: 78
End: 2023-02-10
Payer: MEDICARE

## 2023-02-10 VITALS — HEIGHT: 62 IN | BODY MASS INDEX: 24.66 KG/M2 | WEIGHT: 134 LBS

## 2023-02-10 DIAGNOSIS — R26.2 DIFFICULTY WALKING: ICD-10-CM

## 2023-02-10 DIAGNOSIS — M19.072 ARTHRITIS OF LEFT FOOT: ICD-10-CM

## 2023-02-10 DIAGNOSIS — S99.922A INJURY OF LEFT FOOT, INITIAL ENCOUNTER: Primary | ICD-10-CM

## 2023-02-10 DIAGNOSIS — S86.312A PERONEAL TENDON TEAR, LEFT, INITIAL ENCOUNTER: ICD-10-CM

## 2023-02-10 PROCEDURE — 99213 OFFICE O/P EST LOW 20 MIN: CPT | Performed by: PODIATRIST

## 2023-02-10 PROCEDURE — G8484 FLU IMMUNIZE NO ADMIN: HCPCS | Performed by: PODIATRIST

## 2023-02-10 PROCEDURE — 1090F PRES/ABSN URINE INCON ASSESS: CPT | Performed by: PODIATRIST

## 2023-02-10 PROCEDURE — 1036F TOBACCO NON-USER: CPT | Performed by: PODIATRIST

## 2023-02-10 PROCEDURE — 3017F COLORECTAL CA SCREEN DOC REV: CPT | Performed by: PODIATRIST

## 2023-02-10 PROCEDURE — G8427 DOCREV CUR MEDS BY ELIG CLIN: HCPCS | Performed by: PODIATRIST

## 2023-02-10 PROCEDURE — 1123F ACP DISCUSS/DSCN MKR DOCD: CPT | Performed by: PODIATRIST

## 2023-02-10 PROCEDURE — G8399 PT W/DXA RESULTS DOCUMENT: HCPCS | Performed by: PODIATRIST

## 2023-02-10 PROCEDURE — G8420 CALC BMI NORM PARAMETERS: HCPCS | Performed by: PODIATRIST

## 2023-02-10 NOTE — PROGRESS NOTES
2/10/23     Jody Montoya    : 1945   Sex: female    Age: 78 y.o. Patient's PCP/Provider is:  Jessika Wilkinson MD    Subjective:  Patient is seen today for follow-up regarding continued evaluation regarding left lower extremity issues. She presents today to discuss her MRI results. No other additional abnormalities noted. Chief Complaint   Patient presents with    Foot Pain     Left foot pain       ROS:  Const: Positives and pertinent negatives as per HPI. Musculo: Denies symptoms other than stated above. Neuro: Denies symptoms other than stated above. Skin: Denies symptoms other than stated above. Current Medications:    Current Outpatient Medications:     traMADol (ULTRAM) 50 MG tablet, TAKE 1 TABLET BY MOUTH EVERY 4 TO 6 HOURS AS NEEDED FOR PAIN, Disp: , Rfl:     magnesium (MAGNESIUM-OXIDE) 250 MG TABS tablet, Take 250 mg by mouth daily, Disp: , Rfl:     amLODIPine (NORVASC) 5 MG tablet, Take 1 tablet by mouth daily, Disp: 90 tablet, Rfl: 1    diclofenac sodium (VOLTAREN) 1 % GEL, Apply 2 g topically 2 times daily, Disp: 120 g, Rfl: 5    escitalopram (LEXAPRO) 10 MG tablet, Take 1 tablet by mouth daily, Disp: 30 tablet, Rfl: 5    ibuprofen (ADVIL;MOTRIN) 800 MG tablet, Take 1 tablet by mouth daily as needed for Pain, Disp: 60 tablet, Rfl: 5    glipiZIDE (GLUCOTROL) 5 MG tablet, Take 2 tablets by mouth 2 times daily, Disp: , Rfl:     metFORMIN (GLUCOPHAGE) 500 MG tablet, Take 2 tablets by mouth 2 times daily, Disp: , Rfl:     vitamin E 600 units capsule, Take 600 Units by mouth daily, Disp: , Rfl:     vitamin C (ASCORBIC ACID) 500 MG tablet, Take 500 mg by mouth daily, Disp: , Rfl:     Multiple Vitamins-Minerals (MULTIVITAMIN PO), Take by mouth, Disp: , Rfl:     Biotin 5000 MCG TABS, Take by mouth, Disp: , Rfl:     Omega-3 Fatty Acids (OMEGA 3 PO), Take by mouth, Disp: , Rfl:     pioglitazone (ACTOS) 45 MG tablet, Take 45 mg by mouth daily. , Disp: , Rfl:     enalapril (VASOTEC) 20 MG tablet, Take 20 mg by mouth 2 times daily. , Disp: , Rfl:     Allergies:  No Known Allergies    Vitals:    02/10/23 0947   Weight: 134 lb (60.8 kg)   Height: 5' 2\" (1.575 m)       Exam:  Neurovascular status unchanged. Tenderness noted into the left midfoot region and lateral malleoli region with range of motion and muscle testing performed. Residual edema noted overlying the lateral malleolus and lateral hindfoot region left. No skin abrasions or any signs of infection noted left foot. Diagnostic Studies:     No results found. Procedures:    None    Plan Per Assessment  Selin Mcclain was seen today for foot pain. Diagnoses and all orders for this visit:    Injury of left foot, initial encounter    Arthritis of left foot    Peroneal tendon tear, left, initial encounter    Difficulty walking      Evaluation and management  We did review MRI results with patient in detail today. Patient does not want to proceed with any type of surgical intervention at this time. We did recommend AFO fabrication left lower extremity to reduce current symptoms. Prescription given for patient to have device fabricated. Patient will be followed up at a later date for her continued foot evaluation and care appointment. She was to continue wearing the compression garment daily. Seen By:    Elizabeth Canales DPM    Electronically signed by Elizabeth Canales DPM on 2/10/2023 at 10:13 AM    This note was created using voice recognition software. The note was reviewed however may contain grammatical errors.

## 2023-02-10 NOTE — PROGRESS NOTES
Patient is here today for a review of her MRI results to her left foot. She states she has been wearing her offloading shoe and she is requesting new orthotics today. PCP is Dr. Tunde Anderson, last seen 08/30/2022.

## 2023-02-12 ENCOUNTER — APPOINTMENT (OUTPATIENT)
Dept: CT IMAGING | Age: 78
End: 2023-02-12
Payer: MEDICARE

## 2023-02-12 ENCOUNTER — HOSPITAL ENCOUNTER (INPATIENT)
Age: 78
LOS: 4 days | Discharge: HOME HEALTH | End: 2023-02-16
Attending: EMERGENCY MEDICINE | Admitting: SURGERY
Payer: MEDICARE

## 2023-02-12 DIAGNOSIS — S06.5XAA SUBDURAL HEMATOMA: Primary | ICD-10-CM

## 2023-02-12 DIAGNOSIS — S42.411A CLOSED SUPRACONDYLAR FRACTURE OF RIGHT HUMERUS, INITIAL ENCOUNTER: ICD-10-CM

## 2023-02-12 PROBLEM — T14.90XA TRAUMA: Status: ACTIVE | Noted: 2023-02-12

## 2023-02-12 LAB
HBA1C MFR BLD: 7.6 % (ref 4–5.6)
METER GLUCOSE: 196 MG/DL (ref 74–99)
METER GLUCOSE: 321 MG/DL (ref 74–99)

## 2023-02-12 PROCEDURE — 70450 CT HEAD/BRAIN W/O DYE: CPT

## 2023-02-12 PROCEDURE — 6360000002 HC RX W HCPCS

## 2023-02-12 PROCEDURE — 99222 1ST HOSP IP/OBS MODERATE 55: CPT | Performed by: SURGERY

## 2023-02-12 PROCEDURE — 99222 1ST HOSP IP/OBS MODERATE 55: CPT | Performed by: ORTHOPAEDIC SURGERY

## 2023-02-12 PROCEDURE — 2060000000 HC ICU INTERMEDIATE R&B

## 2023-02-12 PROCEDURE — 6370000000 HC RX 637 (ALT 250 FOR IP)

## 2023-02-12 PROCEDURE — 99285 EMERGENCY DEPT VISIT HI MDM: CPT

## 2023-02-12 PROCEDURE — 2580000003 HC RX 258

## 2023-02-12 PROCEDURE — 76376 3D RENDER W/INTRP POSTPROCES: CPT

## 2023-02-12 PROCEDURE — 99222 1ST HOSP IP/OBS MODERATE 55: CPT | Performed by: NEUROLOGICAL SURGERY

## 2023-02-12 PROCEDURE — 36415 COLL VENOUS BLD VENIPUNCTURE: CPT

## 2023-02-12 PROCEDURE — 83036 HEMOGLOBIN GLYCOSYLATED A1C: CPT

## 2023-02-12 PROCEDURE — 82962 GLUCOSE BLOOD TEST: CPT

## 2023-02-12 PROCEDURE — 73200 CT UPPER EXTREMITY W/O DYE: CPT

## 2023-02-12 RX ORDER — LEVETIRACETAM 500 MG/1
500 TABLET ORAL 2 TIMES DAILY
Status: DISCONTINUED | OUTPATIENT
Start: 2023-02-12 | End: 2023-02-16 | Stop reason: HOSPADM

## 2023-02-12 RX ORDER — SODIUM CHLORIDE 0.9 % (FLUSH) 0.9 %
10 SYRINGE (ML) INJECTION EVERY 12 HOURS SCHEDULED
Status: DISCONTINUED | OUTPATIENT
Start: 2023-02-12 | End: 2023-02-16 | Stop reason: HOSPADM

## 2023-02-12 RX ORDER — HYDRALAZINE HYDROCHLORIDE 20 MG/ML
10 INJECTION INTRAMUSCULAR; INTRAVENOUS EVERY 30 MIN PRN
Status: DISCONTINUED | OUTPATIENT
Start: 2023-02-12 | End: 2023-02-16 | Stop reason: HOSPADM

## 2023-02-12 RX ORDER — LABETALOL HYDROCHLORIDE 5 MG/ML
10 INJECTION, SOLUTION INTRAVENOUS EVERY 4 HOURS PRN
Status: DISCONTINUED | OUTPATIENT
Start: 2023-02-12 | End: 2023-02-16 | Stop reason: HOSPADM

## 2023-02-12 RX ORDER — OXYCODONE HYDROCHLORIDE 5 MG/1
2.5 TABLET ORAL EVERY 4 HOURS PRN
Status: DISCONTINUED | OUTPATIENT
Start: 2023-02-12 | End: 2023-02-16

## 2023-02-12 RX ORDER — SODIUM CHLORIDE 0.9 % (FLUSH) 0.9 %
10 SYRINGE (ML) INJECTION PRN
Status: DISCONTINUED | OUTPATIENT
Start: 2023-02-12 | End: 2023-02-16 | Stop reason: HOSPADM

## 2023-02-12 RX ORDER — DEXTROSE MONOHYDRATE 100 MG/ML
INJECTION, SOLUTION INTRAVENOUS CONTINUOUS PRN
Status: DISCONTINUED | OUTPATIENT
Start: 2023-02-12 | End: 2023-02-16 | Stop reason: HOSPADM

## 2023-02-12 RX ORDER — BISACODYL 10 MG
10 SUPPOSITORY, RECTAL RECTAL DAILY
Status: DISCONTINUED | OUTPATIENT
Start: 2023-02-12 | End: 2023-02-13

## 2023-02-12 RX ORDER — ONDANSETRON 4 MG/1
4 TABLET, ORALLY DISINTEGRATING ORAL EVERY 8 HOURS PRN
Status: DISCONTINUED | OUTPATIENT
Start: 2023-02-12 | End: 2023-02-16 | Stop reason: HOSPADM

## 2023-02-12 RX ORDER — BISACODYL 10 MG
10 SUPPOSITORY, RECTAL RECTAL DAILY PRN
Status: DISCONTINUED | OUTPATIENT
Start: 2023-02-12 | End: 2023-02-12

## 2023-02-12 RX ORDER — INSULIN LISPRO 100 [IU]/ML
0-8 INJECTION, SOLUTION INTRAVENOUS; SUBCUTANEOUS EVERY 4 HOURS
Status: DISCONTINUED | OUTPATIENT
Start: 2023-02-12 | End: 2023-02-14

## 2023-02-12 RX ORDER — BACITRACIN ZINC 500 [USP'U]/G
OINTMENT TOPICAL 3 TIMES DAILY
Status: DISCONTINUED | OUTPATIENT
Start: 2023-02-12 | End: 2023-02-16 | Stop reason: HOSPADM

## 2023-02-12 RX ORDER — ONDANSETRON 2 MG/ML
4 INJECTION INTRAMUSCULAR; INTRAVENOUS EVERY 6 HOURS PRN
Status: DISCONTINUED | OUTPATIENT
Start: 2023-02-12 | End: 2023-02-16 | Stop reason: HOSPADM

## 2023-02-12 RX ORDER — ESCITALOPRAM OXALATE 10 MG/1
10 TABLET ORAL DAILY
Status: DISCONTINUED | OUTPATIENT
Start: 2023-02-12 | End: 2023-02-16 | Stop reason: HOSPADM

## 2023-02-12 RX ORDER — SODIUM CHLORIDE 9 MG/ML
INJECTION, SOLUTION INTRAVENOUS PRN
Status: DISCONTINUED | OUTPATIENT
Start: 2023-02-12 | End: 2023-02-16 | Stop reason: HOSPADM

## 2023-02-12 RX ORDER — POLYETHYLENE GLYCOL 3350 17 G/17G
17 POWDER, FOR SOLUTION ORAL DAILY
Status: DISCONTINUED | OUTPATIENT
Start: 2023-02-12 | End: 2023-02-16 | Stop reason: HOSPADM

## 2023-02-12 RX ORDER — ACETAMINOPHEN 325 MG/1
650 TABLET ORAL EVERY 6 HOURS
Status: DISCONTINUED | OUTPATIENT
Start: 2023-02-12 | End: 2023-02-16 | Stop reason: HOSPADM

## 2023-02-12 RX ORDER — SODIUM CHLORIDE 9 MG/ML
INJECTION, SOLUTION INTRAVENOUS CONTINUOUS
Status: DISCONTINUED | OUTPATIENT
Start: 2023-02-12 | End: 2023-02-12

## 2023-02-12 RX ADMIN — HYDRALAZINE HYDROCHLORIDE 10 MG: 20 INJECTION INTRAMUSCULAR; INTRAVENOUS at 14:32

## 2023-02-12 RX ADMIN — LEVETIRACETAM 500 MG: 500 TABLET, FILM COATED ORAL at 14:32

## 2023-02-12 RX ADMIN — SODIUM CHLORIDE: 9 INJECTION, SOLUTION INTRAVENOUS at 14:43

## 2023-02-12 RX ADMIN — ACETAMINOPHEN 650 MG: 325 TABLET ORAL at 14:32

## 2023-02-12 RX ADMIN — ACETAMINOPHEN 650 MG: 325 TABLET ORAL at 22:15

## 2023-02-12 RX ADMIN — LEVETIRACETAM 500 MG: 500 TABLET, FILM COATED ORAL at 22:11

## 2023-02-12 RX ADMIN — BACITRACIN ZINC: 500 OINTMENT TOPICAL at 14:34

## 2023-02-12 RX ADMIN — SODIUM CHLORIDE, PRESERVATIVE FREE 10 ML: 5 INJECTION INTRAVENOUS at 22:12

## 2023-02-12 RX ADMIN — INSULIN LISPRO 6 UNITS: 100 INJECTION, SOLUTION INTRAVENOUS; SUBCUTANEOUS at 22:33

## 2023-02-12 RX ADMIN — OXYCODONE HYDROCHLORIDE 2.5 MG: 5 TABLET ORAL at 14:32

## 2023-02-12 ASSESSMENT — LIFESTYLE VARIABLES
HOW MANY STANDARD DRINKS CONTAINING ALCOHOL DO YOU HAVE ON A TYPICAL DAY: 1 OR 2
HOW OFTEN DO YOU HAVE A DRINK CONTAINING ALCOHOL: MONTHLY OR LESS

## 2023-02-12 ASSESSMENT — PAIN DESCRIPTION - ORIENTATION: ORIENTATION: RIGHT

## 2023-02-12 ASSESSMENT — PAIN DESCRIPTION - FREQUENCY: FREQUENCY: CONTINUOUS

## 2023-02-12 ASSESSMENT — PAIN DESCRIPTION - DESCRIPTORS: DESCRIPTORS: ACHING

## 2023-02-12 ASSESSMENT — PAIN SCALES - GENERAL: PAINLEVEL_OUTOF10: 4

## 2023-02-12 ASSESSMENT — PAIN - FUNCTIONAL ASSESSMENT: PAIN_FUNCTIONAL_ASSESSMENT: 0-10

## 2023-02-12 ASSESSMENT — PAIN DESCRIPTION - LOCATION: LOCATION: ELBOW

## 2023-02-12 ASSESSMENT — PAIN DESCRIPTION - PAIN TYPE: TYPE: ACUTE PAIN

## 2023-02-12 NOTE — CONSULTS
Department of Orthopedic Surgery  Resident Consult Note          Reason for Consult:  Right arm pain    HISTORY OF PRESENT ILLNESS:       Patient is a 78 y.o. female who presents with the chief complaint of right arm pain. The patient is a transfer from [facility] for a subdural hematoma. Earlier today the patient was taking her dog outside when she tripped and fell down 3 steps. She did hit her head but denies loss of consciousness. She had pain in her right arm was found to have a supracondylar distal humerus fracture and was splinted in [facility] prior to transfer. Patient states she is right-hand dominant. She is comfortable now but has increased pain with any movement. Denies numbness/tingling/paresthesias. Denies any other orthopedic complaints at this time.       Past Medical History:        Diagnosis Date    Arthritis     Carpal tunnel syndrome     Chronic back pain     Corns and callosities     Diabetes mellitus (HCC)     Fibromyositis     Hyperlipidemia     Hypertension     Onychomycosis     Osteoarthritis     Osteoporosis     Peripheral vascular disease ([address]     Sciatica     Type 2 diabetes mellitus without complication (HCC)      Past Surgical History:        Procedure Laterality Date    ABDOMEN SURGERY      hysterectomy    BACK SURGERY      BACK SURGERY      3 back surgerys 3-4 year ago    BREAST BIOPSY Right     stereotactic    CARPAL TUNNEL RELEASE  2019    Left    HYSTERECTOMY (CERVIX STATUS UNKNOWN)      SHOULDER SURGERY      RTC, B    SPINAL FUSION      lumbar    TONSILLECTOMY       Current Medications:   Current Facility-Administered Medications: sodium chloride flush 0.9 % injection 10 mL, 10 mL, IntraVENous, 2 times per day  sodium chloride flush 0.9 % injection 10 mL, 10 mL, IntraVENous, PRN  0.9 % sodium chloride infusion, , IntraVENous, PRN  ondansetron (ZOFRAN-ODT) disintegrating tablet 4 mg, 4 mg, Oral, Q8H PRN **OR** ondansetron (ZOFRAN) injection 4 mg, 4 mg, IntraVENous, Q6H PRN  polyethylene glycol (GLYCOLAX) packet 17 g, 17 g, Oral, Daily  bisacodyl (DULCOLAX) suppository 10 mg, 10 mg, Rectal, Daily  levETIRAcetam (KEPPRA) tablet 500 mg, 500 mg, Oral, BID  acetaminophen (TYLENOL) tablet 650 mg, 650 mg, Oral, Q6H  oxyCODONE (ROXICODONE) immediate release tablet 2.5 mg, 2.5 mg, Oral, Q4H PRN  bacitracin zinc ointment, , Topical, TID  escitalopram (LEXAPRO) tablet 10 mg, 10 mg, Oral, Daily  labetalol (NORMODYNE;TRANDATE) injection 10 mg, 10 mg, IntraVENous, Q4H PRN  hydrALAZINE (APRESOLINE) injection 10 mg, 10 mg, IntraVENous, Q30 Min PRN  glucose chewable tablet 16 g, 4 tablet, Oral, PRN  dextrose bolus 10% 125 mL, 125 mL, IntraVENous, PRN **OR** dextrose bolus 10% 250 mL, 250 mL, IntraVENous, PRN  glucagon injection 1 mg, 1 mg, SubCUTAneous, PRN  dextrose 10 % infusion, , IntraVENous, Continuous PRN  insulin lispro (HUMALOG) injection vial 0-8 Units, 0-8 Units, SubCUTAneous, Q4H  Allergies:  Patient has no known allergies. Social History:   TOBACCO:   reports that she has never smoked. She has never used smokeless tobacco.  ETOH:   reports no history of alcohol use. DRUGS:   reports no history of drug use. ACTIVITIES OF DAILY LIVING:    OCCUPATION:    Family History:   History reviewed. No pertinent family history.     REVIEW OF SYSTEMS:  CONSTITUTIONAL:  negative for  fevers, chills  EYES:  negative for blurred vision, visual disturbance  HEENT:  negative for  hearing loss, voice change  RESPIRATORY:  negative for  dyspnea, wheezing  CARDIOVASCULAR:  negative for  chest pain, palpitations  GASTROINTESTINAL:  negative for nausea, vomiting  GENITOURINARY:  negative for frequency, urinary incontinence  HEMATOLOGIC/LYMPHATIC:  negative for bleeding and petechiae  MUSCULOSKELETAL:  positive for right elbow pain  NEUROLOGICAL:  negative for headaches, dizziness  BEHAVIOR/PSYCH:  negative for increased agitation and anxiety    PHYSICAL EXAM:    VITALS:  BP (!) 99/44   Pulse 85   Temp 97.1 °F (36.2 °C) (Temporal)   Resp 22   Wt 134 lb (60.8 kg)   SpO2 96%   BMI 24.51 kg/m²   CONSTITUTIONAL:  awake, alert, cooperative, no apparent distress, and appears stated age  MUSCULOSKELETAL:  Right upper Extremity:  The patient's splint was taken down for examination. Skin intact circumferentially swelling noted over the elbow. +TTP globally over the right elbow. Nontender about the forearm wrist hand or shoulder. Compartments soft and compressible  +AIN/PIN/Ulnar nerve function intact grossly  +Radial pulse, Brisk Cap refill, hand warm and perfused  Sensation intact to touch in radial/ulnar/median nerve distributions to hand    Secondary Exam:   leftUE: No obvious signs of trauma. -TTP to fingers, hand, wrist, forearm, elbow, humerus, shoulder or clavicle. compartments soft and compressible. bilateralLE: No obvious signs of trauma. -TTP to foot, ankle, leg, knee, thigh, hip. compartments soft and compressible. DATA:    CBC:   Lab Results   Component Value Date/Time    WBC 11.2 02/12/2023 10:25 AM    RBC 4.13 02/12/2023 10:25 AM    HGB 12.7 02/12/2023 10:25 AM    HCT 38.4 02/12/2023 10:25 AM    MCV 92.9 02/12/2023 10:25 AM    MCH 30.7 02/12/2023 10:25 AM    MCHC 33.0 02/12/2023 10:25 AM    RDW 13.4 02/12/2023 10:25 AM     02/12/2023 10:25 AM    MPV 7.9 02/12/2023 10:25 AM     PT/INR:    Lab Results   Component Value Date/Time    PROTIME 10.9 02/12/2023 10:25 AM    INR 1.00 02/12/2023 10:25 AM     CRP/ESR: No results found for: CRP, SEDRATE  Lactic Acid : No results found for: LACTA    Radiology Review:  02/12/23 - XR of the right humerus and elbow reviewed demonstrates a minimally displaced supracondylar distal humerus fracture. There is some flexion at the fracture site. X-ray of the right knee reviewed demonstrates no acute fractures or dislocations.     IMPRESSION:   Right supracondylar distal humerus fracture    PLAN:  NWB - RUE  The patient was placed back into a well-padded posterior long-arm splint. Pain Control multimodal  PT/OT   Continue ice and elevation to decrease swelling  We will order a CT scan of the right elbow to further evaluate the fracture. Likely will be able to manage her fracture nonoperatively however we will review the CT and make recommendations based on alignment of the fracture. Patient may have a diet today we will keep n.p.o. at midnight for possible surgical intervention tomorrow pending CT scan results. Discuss with Dr. Domitila Mathew       I have seen and evaluated the patient and agree with the above assessment on today's visit. I have performed the key components of the history and physical examination and concur completely with the findings and plans as documented. Agree with ROS, examination, FMH, PMH, PSH, SocHx, and allergies as above. Patient physically seen and examined. Patient status post fall on 2/12/2023 while walking her dog. She was evaluated outside hospital found to have subdural hematoma as well as a right distal humerus fracture therefore was transferred to our facility. She has a extra-articular transverse supracondylar distal humerus fracture which is minimally displaced. I talked to the patient and her boyfriend in detail about operative versus nonoperative treatment. When discussing the nonoperative treatment we talked about the high risk of stiffness due to immobilization allow the fracture to heal.  She would like to proceed with operative fixation. I did go through the risk in detail including death from anesthesia, infection, ulnar nerve palsy or irritation due to hardware placement, wound healing issues, infection, nonunion, and the fact that she could even get stiff with plating. I also explained there can be unforeseeable complications. She understood and would like to proceed. She stated that she is right-hand dominant and active and would like it fixed so she can start early range of motion.     Past Medical History:   Diagnosis Date    Arthritis     Carpal tunnel syndrome     Chronic back pain     Corns and callosities     Diabetes mellitus ([address]     Fibromyositis     Hyperlipidemia     Hypertension     Onychomycosis     Osteoarthritis     Osteoporosis     Peripheral vascular disease ([address]     Sciatica     Type 2 diabetes mellitus without complication ([facility] [address]      Past Surgical History:   Procedure Laterality Date    ABDOMEN SURGERY      hysterectomy    BACK SURGERY      BACK SURGERY      3 back surgerys 3-4 year ago    BREAST BIOPSY Right     stereotactic    CARPAL TUNNEL RELEASE  2019    Left    HYSTERECTOMY (CERVIX STATUS UNKNOWN)      SHOULDER SURGERY      RTC, B    SPINAL FUSION      lumbar    TONSILLECTOMY       History reviewed. No pertinent family history. Social History     Tobacco Use    Smoking status: Never    Smokeless tobacco: Never   Vaping Use    Vaping Use: Never used   Substance Use Topics    Alcohol use: No    Drug use: No     No Known Allergies        Physical Examination:   General appearance: alert, well appearing, and in no distress,  normal appearing weight. Right upper extremity is splinted, right periorbital region with ecchymosis due to her fall  Mental status: alert, oriented to person, place, and time, normal mood, behavior, speech, dress, motor activity, and thought processes  Abdomen: soft, nondistended  Resp:   resp easy and unlabored, no audible wheezes note, normal symmetrical expansion of both hemithoraces  Cardiac: distal pulses palpable, skin and extremities well perfused  Neurological: alert, oriented X3, normal speech, no focal findings or movement disorder noted, motor and sensory grossly normal bilaterally, normal muscle tone, no tremors  HEENT: normochephalic atraumatic, external ears and eyes normal, sclera normal, neck supple, no nasal discharge.    Extremities:   peripheral pulses normal, no edema, redness or tenderness in the calves   Skin: normal coloration, no rashes or open wounds, no suspicious skin lesions noted  Psych: Affect euthymic   Musculoskeletal:   Extremity:  Read by examination right upper extremity. Splint is well fitting. Wiggles fingers sensation intact distally. Capillary for less than 3 seconds. Plan on open reduction internal fixation right distal humerus in the near future. ELECTRONICALLY signed by:    Sandie Thomas MD  2/14/23   This is been dictated utilizing voice recognition software. All efforts have been made to make the note accurate although inadvertent errors may be present.

## 2023-02-12 NOTE — PROGRESS NOTES
Discussed with patient about code status, she wishes to be limited code no to all interventions at this time. She has capacity to make this decision and is understanding of all risks and benefits. Will update in her chart.      Electronically signed by Joanna Wilks MD on 2/12/2023 at 4:59 PM

## 2023-02-12 NOTE — ED NOTES
Nurse to nurse called to [facility]. Patient in transport to go to CT and then to the unit.       Balwinder Lai RN  02/12/23 [id number]

## 2023-02-12 NOTE — ED NOTES
This RN called the unit to tell them the CT is not until 1400 and patient is on the way up to the unit now.       Bre Blum RN  02/12/23 [id number]

## 2023-02-12 NOTE — ED PROVIDER NOTES
[facility] ENCOUNTER        Pt Name: Lorie Hoff  MRN: [id number]  [place] 1945  Date of evaluation: 2/12/2023  Provider: Oscar Snowden MD  PCP: Rosalie Ricks MD  Note Started: 12:10 PM EST 2/12/23    CHIEF COMPLAINT       Chief Complaint   Patient presents with    Consultation     Transfer from [place], fall down steps, right elbow fx, SDH       HISTORY OF PRESENT ILLNESS: 1 or more Elements        Limitations to history : None    Lorie Hoff is a 78 y.o. female who presents for trauma transfer. Patient was a transfer from [facility] after a fall. Fall demonstrated bilateral subdural hematomas and right supracondylar elbow fracture. She was splinted and transferred here for further evaluation. She says she is not on blood thinners. She denies fevers or chills. No chest pain or shortness of breath. She denies any neck or back pain. She denies any other complaints. Nursing Notes were all reviewed and agreed with or any disagreements were addressed in the HPI. REVIEW OF EXTERNAL NOTE :       ED notes from [facility], outpatient CT of the head and x-ray of the right elbow from today    Controlled Substance Monitoring:    Acute and Chronic Pain Monitoring:   RX Monitoring 1/13/2021   Periodic Controlled Substance Monitoring Possible medication side effects, risk of tolerance/dependence & alternative treatments discussed. ;Obtaining appropriate analgesic effect of treatment. Chronic Pain > 50 MEDD Re-evaluated the status of the patient's underlying condition causing pain. Chronic Pain > 80 MEDD -           REVIEW OF SYSTEMS :      Positives and Pertinent negatives as per HPI.      SURGICAL HISTORY     Past Surgical History:   Procedure Laterality Date    ABDOMEN SURGERY      hysterectomy    BACK SURGERY      BACK SURGERY      3 back surgerys 3-4 year ago    BREAST BIOPSY Right     stereotactic    CARPAL TUNNEL RELEASE  2019    Left    HYSTERECTOMY (CERVIX STATUS UNKNOWN)      SHOULDER SURGERY      RTC, B    SPINAL FUSION      lumbar    TONSILLECTOMY         CURRENTMEDICATIONS       Previous Medications    AMLODIPINE (NORVASC) 5 MG TABLET    Take 1 tablet by mouth daily    BIOTIN 5000 MCG TABS    Take by mouth    DICLOFENAC SODIUM (VOLTAREN) 1 % GEL    Apply 2 g topically 2 times daily    ENALAPRIL (VASOTEC) 20 MG TABLET    Take 20 mg by mouth 2 times daily. ESCITALOPRAM (LEXAPRO) 10 MG TABLET    Take 1 tablet by mouth daily    GLIPIZIDE (GLUCOTROL) 5 MG TABLET    Take 2 tablets by mouth 2 times daily    IBUPROFEN (ADVIL;MOTRIN) 800 MG TABLET    Take 1 tablet by mouth daily as needed for Pain    MAGNESIUM (MAGNESIUM-OXIDE) 250 MG TABS TABLET    Take 250 mg by mouth daily    METFORMIN (GLUCOPHAGE) 500 MG TABLET    Take 2 tablets by mouth 2 times daily    MULTIPLE VITAMINS-MINERALS (MULTIVITAMIN PO)    Take by mouth    OMEGA-3 FATTY ACIDS (OMEGA 3 PO)    Take by mouth    PIOGLITAZONE (ACTOS) 45 MG TABLET    Take 45 mg by mouth daily. TRAMADOL (ULTRAM) 50 MG TABLET    TAKE 1 TABLET BY MOUTH EVERY 4 TO 6 HOURS AS NEEDED FOR PAIN    VITAMIN C (ASCORBIC ACID) 500 MG TABLET    Take 500 mg by mouth daily    VITAMIN E 600 UNITS CAPSULE    Take 600 Units by mouth daily       ALLERGIES     Patient has no known allergies. FAMILYHISTORY     History reviewed. No pertinent family history.      SOCIAL HISTORY       Social History     Tobacco Use    Smoking status: Never    Smokeless tobacco: Never   Vaping Use    Vaping Use: Never used   Substance Use Topics    Alcohol use: No    Drug use: No       SCREENINGS        Garfield Coma Scale  Eye Opening: Spontaneous  Best Verbal Response: Oriented  Best Motor Response: Obeys commands  Garfield Coma Scale Score: 15                CIWA Assessment  BP: (!) 176/113  Heart Rate: 100           PHYSICAL EXAM  1 or more Elements     ED Triage Vitals [02/12/23 1128]   BP Temp Temp src Heart Rate Resp SpO2 Height Weight   (!) 176/113 97 °F (36.1 °C) -- (!) 102 18 96 % -- 134 lb (60.8 kg)     Primary Survey:  Airway: patient, trachea midline,   Breathing: Spontaneous, breath sounds equal bilaterally, symmetric chest rise  Circulation: 2+ femoral pulses, 2+ DP/PT pulses  Disability: GCS 15      Constitutional/General: Alert and oriented x3  Head: Normocephalic, right temporal scalp ecchymosis, steri strips in place  Eyes: PERRL, EOMI, globes intact, no hyphema, no evidence of entrapment, conjunctiva pink. Right subconjunctival hemorrhage. Mouth: Oropharynx clear, handling secretions, no trismus. Neck: No crepitus, no lacerations, abrasions, deformities, or stepoffs. Back: No midline cervical spine tenderness. No thoracic spine tenderness. No lumbar spine tenderness. No Stepoffs, abrasions, lacerations, or deformities. Pulmonary: Lungs clear to auscultation bilaterally, no wheezes, rales, or rhonchi. Not in respiratory distress  Cardiovascular:  Regular rate and rhythm, no murmurs, gallops, or rubs. 2+ distal pulses  Chest: no chest wall tenderness, no crepitus  Abdomen: Soft, non tender, non distended, +BS, no rebound, guarding, or rigidity. No pulsatile masses appreciated  Extremities: Moves all extremities x 4. Warm and well perfused, no clubbing, cyanosis, or edema. Capillary refill <3 seconds. Right upper extremity: Immobilized in long-arm splint and sling. Wiggles fingers. Neurovascularly intact distally. 2+ radial pulses. Capillary refill <3 secondary. Warm and well perfused. Median, radial, ulnar nerves intact. Sensation intact to light touch. . Cardinal movements of the hand intact. No evidence of compartment syndrome. All flexor and extensor mechanisms intact.       Skin: warm and dry without rash  Neurologic: GCS 15, CN 2-12 grossly intact, no focal deficits, symmetric strength 5/5 in the upper and lower extremities bilaterally  Psych: Normal Affect              DIAGNOSTIC RESULTS   LABS:    Labs Reviewed   POCT GLUCOSE - Abnormal; Notable for the following components:       Result Value    Meter Glucose 196 (*)     All other components within normal limits   URINE DRUG SCREEN   HEMOGLOBIN A1C   POCT GLUCOSE   POCT GLUCOSE       As interpreted by me, the above displayed labs are abnormal. All other labs obtained during this visit were within normal range or not returned as of this dictation. RADIOLOGY:   Unless a wet read is documented in the ED course or MDM, non-plain film images such as CT, Ultrasound and MRI are read by the radiologist unless otherwise specified in the medical decision-making. Plain radiographic images are preliminarily interpreted by this ED Provider with the findings documented in the ED Course with official radiology read to follow.     Interpretation per the Radiologist below, if available at the time of this note:    CT HEAD WO CONTRAST    (Results Pending)     XR ELBOW RIGHT (MIN 3 VIEWS)    Result Date: 2023                                      [address]                                              [phone]                                                XRay Report                                                  Signed    [facility]                                                                MR#: Floy Kaleno          : 1945                                                                [de-identified]            Age/Sex: 78 / F                                                                Admit Date: 23            Loc: ER                                                                                     Attending Dr:     Oskar Mckenzie Physician: Carmelita Hernandez MD   Date of Service: 23  Procedure(s): XR elbow RT min 3V  Accession Number(s): [id number]    cc: Daniel Demarco MD      INDICATION:  Right elbow pain post fall. TECHNIQUE:  Four views of the right elbow. COMPARISON:  None available. FINDINGS:        Supracondylar fracture with mild displacement. No findings of an intracondylar   extension. No dislocation identified. RIGHT elbow joint effusion. IMPRESSION:      Supracondylar RIGHT humeral fracture. No dislocation identified. RIGHT elbow joint effusion. Signed by Clementina Chaudhari MD           Dictated By: Palmira Irene MD DD/DT: 23               Signed By: Palmira Irene MD 23          :    XR KNEE RIGHT (3 VIEWS)    Result Date: 2023                                      [address]                                              [address]                                              [phone]                                                XRay Report                                                  Signed    [facility]                                                                MR[facility] [id number]  [id number]          : 1945                                                                [de-identified]            Age/Sex: 78 / F                                                                Admit Date: 23            Loc: ER                                                                                     Attending Dr:     Deidra Negron Physician: Ilda aPrk MD   Date of Service: 23  Procedure(s): XR knee RT 3V  Accession Number(s): [id number]    cc: Ilda Park MD      INDICATION:  Right knee pain post fall. TECHNIQUE:  Three views of the right knee. COMPARISON:  None available. FINDINGS:     There is no displaced fracture. The alignment is anatomic. No soft tissue   abnormality is seen.   There is no joint effusion. IMPRESSION:      1. No acute fracture of the right knee. .         Signed by Paul Dailey MD           Dictated By: Lucrecia Montelongo MDDD/DT: 23 0855               Signed By: Lucrecia Montelongo MD 23 0952         :    CT HEAD WO CONTRAST    Result Date: 2023                                      [address] [place], [place] [address]                                              [phone]                                               CT Scan Report                                                  Signed    [facility]                                                                MR#: Gayathri Hicks          : 1945                                                                [de-identified]            Age/Sex: 78 / F                                                                Admit Date: 23            Loc: ER                                                                                     Attending Dr:     Chelo Cunningham Physician: Keegan Ness MD   Date of Service: 23  Procedure(s): CT head/brain wo con  Accession Number(s): [id number]    cc: Keegan Ness MD      INDICATION:  [place] down stairs, abrasions and bruising to right face. TECHNIQUE:  CT of the brain and facial bones was performed without contrast.        Automated mA/kV exposure control was utilized and patient examination was performed   in strict accordance with principles of ALARA. RADIATION AMOUNT:  834 mGy-cm. COMPARISON:  None available. FINDINGS:       There is a RIGHT frontal superficial soft tissue hematoma. No underlying fracture   is identified. There is a RIGHT subdural hematoma with a maximum depth of   approximately 4 mm.   There is a RIGHT parafalcine subdural hematoma and LEFT   tentorial subdural hematoma each with a maximum depth of approximately 2 mm. No   associated mass effect with these findings. Chronic small vessel changes and   generalized volume loss. Paranasal sinuses, middle ears and mastoid air cells are   clear. No acute orbital abnormality. No facial fracture identified. No acute orbital abnormality is identified. The   cribriform plate and lamina papyracea are intact. The paranasal sinuses are clear. No mandibular fracture or dislocation. No acute abnormality the temporomandibular   joints. The hard palate and pterygoid plates are intact. The bony skull base is of   normal appearance. Visualized portions of the middle ears and mastoid air cells are   clear. The partially visualized soft tissues of the skull base of normal   appearance. IMPRESSION:      There is a RIGHT hemispheric subdural hematoma with a maximum depth of   approximately 4 mm. There is a RIGHT parafalcine subdural hematoma and LEFT   tentorial subdural hematoma each with a maximum depth of approximately 2 mm. No   associated mass effect with these findings. Chronic small vessel changes and generalized volume loss. There is a RIGHT frontal superficial soft tissue hematoma. No underlying fracture   is identified. No findings of facial fracture.          Signed by Puja Mills MD           Dictated By: Mirlande Jiménez MD DD/DT: 02/12/23 0906               Signed By: Mirlande Jiménez MD 02/12/23 0951          :    CT FACIAL BONES WO CONTRAST    Result Date: 2/12/2023                                      [address]                                             [address]                                              [phone]                                               CT Scan Report                                                  Signed    Patient: Sofía Pandey MR#: Magaly Brady          : 1945                                                                [de-identified]            Age/Sex: 78 / F                                                                Admit Date: 23            Loc: ER                                                                                     Attending Dr:     Omar Leon Physician: Janna Simpson MD   Date of Service: 23  Procedure(s): CT facial bones wo con  Accession Number(s): [id number]    cc: Daniel Demarco MD      INDICATION:  Vevelyn Huh down stairs, abrasions and bruising to right face. TECHNIQUE:  CT of the brain and facial bones was performed without contrast.        Automated mA/kV exposure control was utilized and patient examination was performed   in strict accordance with principles of ALARA. RADIATION AMOUNT:  834 mGy-cm. COMPARISON:  None available. FINDINGS:       There is a RIGHT frontal superficial soft tissue hematoma. No underlying fracture   is identified. There is a RIGHT subdural hematoma with a maximum depth of   approximately 4 mm. There is a RIGHT parafalcine subdural hematoma and LEFT   tentorial subdural hematoma each with a maximum depth of approximately 2 mm. No   associated mass effect with these findings. Chronic small vessel changes and   generalized volume loss. Paranasal sinuses, middle ears and mastoid air cells are   clear. No acute orbital abnormality. No facial fracture identified. No acute orbital abnormality is identified. The   cribriform plate and lamina papyracea are intact. The paranasal sinuses are clear. No mandibular fracture or dislocation. No acute abnormality the temporomandibular   joints. The hard palate and pterygoid plates are intact. The bony skull base is of   normal appearance. Visualized portions of the middle ears and mastoid air cells are   clear.   The partially visualized soft tissues of the skull base of normal   appearance. IMPRESSION:      There is a RIGHT hemispheric subdural hematoma with a maximum depth of   approximately 4 mm. There is a RIGHT parafalcine subdural hematoma and LEFT   tentorial subdural hematoma each with a maximum depth of approximately 2 mm. No   associated mass effect with these findings. Chronic small vessel changes and generalized volume loss. There is a RIGHT frontal superficial soft tissue hematoma. No underlying fracture   is identified. No findings of facial fracture. Signed by Manuela Cherry MD           Dictated By: Debbie Dias MD DD/DT: 23               Signed By: Debbie Dias MD 23 0951          :    CT CERVICAL SPINE WO CONTRAST    Result Date: 2023                                      [address]                                              [address]                                              [phone]                                               CT Scan Report                                                  Signed    [facility]                                                                MR#: Armyesha Saldivar          : 1945                                                                [de-identified]            Age/Sex: 78 / F                                                                Admit Date: 23            Loc: ER                                                                                     Attending Dr:     Remi Bence Physician: Kishore Flores MD   Date of Service: 23  Procedure(s): CT cervical spine wo con  Accession Number(s): [id number]    cc: Kishore Flores MD      INDICATION:  [place] down stairs, abrasions and bruising to right face.        TECHNIQUE:  CT of the cervical spine was performed without intravenous or   intrathecal contrast.  Sagittal and coronal reconstructions were generated. Automated mA/kV exposure control was utilized and patient examination was performed   in strict accordance with principles of ALARA. RADIATION AMOUNT:  492 mGy-cm. COMPARISON:  None available. FINDINGS:       Normal alignment of the cervical vertebral bodies. The vertebral bodies are of   normal height. There is loss of intervertebral disc space height endplate   sclerosis and marginal osteophyte formation C5-C6, C6-C7 and C7-T1. Mild cervical   facet arthrosis. Degenerative changes of the C1-C2 articulation and craniocervical   junction otherwise of normal relationship. Prevertebral soft tissues of normal   appearance. Partially visualized nodular enlarged thyroid. Examination through the cranial cervical junction showing it to widely patent. Examination through the C2-C3 intervertebral level revealing facet arthrosis. Mild   uncovertebral degenerative changes. No significant central stenosis. No   significant foraminal narrowing. Examination through the C3-C4 intervertebral level revealing mild uncovertebral   degenerative changes. Small posterior disc osteophyte. No significant central   stenosis or foraminal narrowing. Examination through the C4-C5 intervertebral level revealing facet arthrosis. Uncovertebral degenerative changes. Posterior disc osteophyte. Effacement of the   CSF. Associated cord flattening. Central AP canal diameter measuring   approximately 6 mm. Moderate to severe RIGHT foraminal narrowing. Moderate LEFT   foraminal narrowing. Examination through the C5-C6 intervertebral level revealing facet arthrosis with   uncovertebral degenerative changes and posterior disc osteophyte. There is a mild   associated central stenosis. Effacement of the CSF. Question mild impress on the   ventral cord. Central AP canal diameter measuring approximately 7 mm.   Moderate   bilateral foraminal narrowing. Examination through the C6-C7 intervertebral level revealing facet arthrosis with   uncovertebral degenerative changes with posterior disc osteophyte. Mild associated   central stenosis. No significant RIGHT foraminal narrowing. Mild LEFT foraminal   narrowing. Examination through the C7-T1 intervertebral level revealing small posterior disc   osteophyte. No significant central stenosis. No significant foraminal narrowing. Impression:      Spondylotic changes and facet arthrosis. No findings of fracture or listhesis. Posterior disc osteophyte at C4-C5 resulting in effacement of the CSF and   associated cord flattening. This is of a chronic appearance. Central AP canal   diameter measuring approximately 6 mm. Moderate to severe RIGHT foraminal   narrowing. Moderate LEFT foraminal narrowing. C5-C6 Effacement of the CSF. Question mild impress on the ventral cord. Central AP   canal diameter measuring approximately 7 mm. Moderate bilateral foraminal   narrowing. C6-C7 Mild central stenosis. No significant RIGHT foraminal narrowing. Mild LEFT   foraminal narrowing.          Signed by Trung Hurtado MD           Dictated By: Brenda Tobar MD DD/DT: 23 1033               Signed By: Brenda Tobar MD 23 1049          :    XR CHEST PORTABLE    Result Date: 2023                                      [address] [place] [facility]                                              [phone]                                                XRay Report                                                  Signed    Flores Mishra [facility]                                                                MR#: Peggy Hamm          : 1945                                                                [de-identified] Age/Sex: 78 / F                                                                Admit Date: 23            Loc: ER                                                                                     Attending Dr:     Ordering Physician: Tilden Gottron MD   Date of Service: 23  Procedure(s): XR chest 1V portable  Accession Number(s): [id number]    cc: Daniel Demarco MD      INDICATION:  Pain post fall. TECHNIQUE:  Frontal chest was obtained at 1011 hours. COMPARISON:  None available      FINDINGS:     The cardiomediastinal silhouette is normal in size. There is no consolidation or   atelectasis in either lung. There are no pleural effusions. There is no   pneumothorax. No acute osseous process. IMPRESSION:   No acute cardiopulmonary disease. .           Signed by Michelle Larson MD           Dictated By: Kamlesh Nunez MD        DD/DT: 23 1015               Signed By: Kamlesh Nunez MD 23 1054                 :    XR HUMERUS LEFT (1 VIEW)    Result Date: 2023                                      [address]                                              [address]                                              [phone]                                                XRay Report                                                  Signed    [facility]                                                                MR#: [id number]          : 1945                                                                [de-identified]            Age/Sex: 78 / F                                                                Admit Date: 23            Loc: ER                                                                                     Attending Dr:     Daxa Sandy Physician: Tilden Gottron MD   Date of Service: 23  Procedure(s): XR humerus RT  Accession Number(s): [id number]    cc: Daniel Demarco MD      INDICATION:  Pain in right upper arm, and elbow, post fall. TECHNIQUE:  Two views of the right humerus. COMPARISON:  None Available. FINDINGS:        2 views of the RIGHT humerus showing supracondylar fracture with mild displacement. No findings of an intracondylar extension. No dislocation identified. Degenerative changes of the RIGHT shoulder. Chronic loss of acromiohumeral joint   space. IMPRESSION:      Supracondylar RIGHT humeral fracture. No dislocation identified. Signed by Doretha Clark MD           Dictated By: Sarah Flores MD DD/DT: 02/12/23 0816               Signed By: Sarah Flores MD 02/12/23 0924          :      No results found. PAST MEDICAL HISTORY/Chronic Conditions Affecting Care      has a past medical history of Arthritis, Carpal tunnel syndrome, Chronic back pain, Corns and callosities, Diabetes mellitus ([address] Fibromyositis, Hyperlipidemia, Hypertension, Onychomycosis, Osteoarthritis, Osteoporosis, Peripheral vascular disease ([address] Sciatica, and Type 2 diabetes mellitus without complication ([address]      EMERGENCY DEPARTMENT COURSE    Vitals:    Vitals:    02/12/23 1128 02/12/23 1215   BP: (!) 176/113    Pulse: (!) 102 100   Resp: 18 21   Temp: 97 °F (36.1 °C)    SpO2: 96% 97%   Weight: 134 lb (60.8 kg)        Patient was given the following medications:  Medications   sodium chloride flush 0.9 % injection 10 mL (has no administration in time range)   sodium chloride flush 0.9 % injection 10 mL (has no administration in time range)   0.9 % sodium chloride infusion (has no administration in time range)   ondansetron (ZOFRAN-ODT) disintegrating tablet 4 mg (has no administration in time range)     Or   ondansetron (ZOFRAN) injection 4 mg (has no administration in time range)   polyethylene glycol (GLYCOLAX) packet 17 g (has no administration in time range)   bisacodyl (DULCOLAX) suppository 10 mg (has no administration in time range)   levETIRAcetam (KEPPRA) tablet 500 mg (has no administration in time range)   0.9 % sodium chloride infusion (has no administration in time range)   acetaminophen (TYLENOL) tablet 650 mg (has no administration in time range)   oxyCODONE (ROXICODONE) immediate release tablet 2.5 mg (has no administration in time range)   bacitracin zinc ointment (has no administration in time range)   escitalopram (LEXAPRO) tablet 10 mg (has no administration in time range)   labetalol (NORMODYNE;TRANDATE) injection 10 mg (has no administration in time range)   hydrALAZINE (APRESOLINE) injection 10 mg (has no administration in time range)   glucose chewable tablet 16 g (has no administration in time range)   dextrose bolus 10% 125 mL (has no administration in time range)     Or   dextrose bolus 10% 250 mL (has no administration in time range)   glucagon injection 1 mg (has no administration in time range)   dextrose 10 % infusion (has no administration in time range)   insulin lispro (HUMALOG) injection vial 0-8 Units (0 Units SubCUTAneous Held 2/12/23 1248)             Medical Decision Making/Differential Diagnosis:    CC/HPI Summary, Social Determinants of health, Records Reviewed, DDx, testing done/not done, ED Course, Reassessment, disposition considerations/shared decision making with patient, consults, disposition:        ED Course as of 02/12/23 1307   Sun Feb 12, 2023   1258 My independent interpretation of the x-ray of her right elbow shows a supracondylar fracture. [CD]      ED Course User Index  [CD] Nino Palomino MD       Medical Decision Making  Differential includes but not limited to subdural hematoma, skull fracture, elbow fracture, contusion. I independently interpreted the right elbow x-ray from [facility] and it shows a right supracondylar fracture. Trauma was consulted for her subdural hematomas on the CAT scan of her brain.   She is not on any anticoagulants. She is not in distress. She was ordered Keppra by trauma already. I believe she was given this prior to transfer. She has normal neurologic exam and normal mental status. She is not in any distress. Repeat CAT scans ordered. She is already splinted for her elbow. Consult placed orthopedics as well. Trauma to admit. Problems Addressed:  Closed supracondylar fracture of right humerus, initial encounter: acute illness or injury  Subdural hematoma: acute illness or injury    Amount and/or Complexity of Data Reviewed  Independent Historian: EMS  External Data Reviewed: notes. Radiology: independent interpretation performed. Decision-making details documented in ED Course. Risk  OTC drugs. Prescription drug management. Decision regarding hospitalization. CONSULTS:   Trauma surgery, orthopedic surgery        PROCEDURES   Unless otherwise noted below, none       CRITICAL CARE TIME (.cct)           I am the Primary Clinician of Record. FINAL IMPRESSION      1. Subdural hematoma    2. Closed supracondylar fracture of right humerus, initial encounter          DISPOSITION/PLAN     DISPOSITION Admitted 02/12/2023 12:31:16 PM      PATIENT REFERRED TO:  No follow-up provider specified.     DISCHARGE MEDICATIONS:  New Prescriptions    No medications on file       DISCONTINUED MEDICATIONS:  Discontinued Medications    No medications on file              (Please note that portions of this note were completed with a voice recognition program.  Efforts were made to edit the dictations but occasionally words are mis-transcribed.)    Teresita Hartman MD (electronically signed)            Dorys Collins MD  02/12/23 0530

## 2023-02-12 NOTE — H&P
TRAUMA HISTORY & PHYSICAL  Surgical Resident/Advance Practice Nurse  2/12/2023  12:22 PM    PRIMARY SURVEY    CHIEF COMPLAINT:  Trauma consult. Injury occurred just prior to arrival. Patient had mechanical fall over her dog, hit her head, no LOC. Denies n/v or visual changes. At [facility] scans showed B/L SDH and R humeral nondisplaced fracture. Pain in her arm is well controlled, splinted and in sling. Denies weakness, numbness, paraesthesias. AIRWAY:   Airway Normal  EMS ETT Absent  Noisy respirations Absent  Retractions: Absent  Vomiting/bleeding: Absent      BREATHING:    Midaxillary breath sound left:  Normal  Midaxillary breath sound right:  Normal    Cough sound intensity:  good   FiO2:  Room air    SMI 2000 mL.       CIRCULATION:   Femerol pulse intensity: Strong  Palpebral conjunctiva: Pink     Vitals:    02/12/23 1128   BP: (!) 176/113   Pulse: (!) 102   Resp: 18   Temp: 97 °F (36.1 °C)   SpO2: 96%       Vitals:    02/12/23 1128   BP: (!) 176/113   Pulse: (!) 102   Resp: 18   Temp: 97 °F (36.1 °C)   SpO2: 96%   Weight: 134 lb (60.8 kg)        FAST EXAM: Deferred    Central Nervous System    GCS Initial 15 minutes   Eye  Motor  Verbal 4 - Opens eyes on own  6 - Follows simple motor commands  5 - Alert and oriented 4 - Opens eyes on own  6 - Follows simple motor commands  5 - Alert and oriented     Neuromuscular blockade: No  Pupil size:  Left 3 mm    Right 3 mm  Pupil reaction: Yes    Wiggles fingers: Left Yes Right Yes  Wiggles toes: Left Yes   Right Yes    Hand grasp:   Left  Present      Right  Present  Plantar flexion: Left  Present      Right   Present    Loss of consciousness:  No    History Obtained From:  Patient & EMS  Private Medical Doctor: Rolando Hancock MD      Pre-exisiting Medical History:  yes    Conditions:   Past Medical History:   Diagnosis Date    Arthritis     Carpal tunnel syndrome     Chronic back pain     Corns and callosities     Diabetes mellitus (HCC)     Fibromyositis Hyperlipidemia     Hypertension     Onychomycosis     Osteoarthritis     Osteoporosis     Peripheral vascular disease ([facility] [address]     Sciatica     Type 2 diabetes mellitus without complication ([facility])          Medications:   No current facility-administered medications on file prior to encounter. Current Outpatient Medications on File Prior to Encounter   Medication Sig Dispense Refill    traMADol (ULTRAM) 50 MG tablet TAKE 1 TABLET BY MOUTH EVERY 4 TO 6 HOURS AS NEEDED FOR PAIN      magnesium (MAGNESIUM-OXIDE) 250 MG TABS tablet Take 250 mg by mouth daily      amLODIPine (NORVASC) 5 MG tablet Take 1 tablet by mouth daily 90 tablet 1    diclofenac sodium (VOLTAREN) 1 % GEL Apply 2 g topically 2 times daily 120 g 5    escitalopram (LEXAPRO) 10 MG tablet Take 1 tablet by mouth daily 30 tablet 5    ibuprofen (ADVIL;MOTRIN) 800 MG tablet Take 1 tablet by mouth daily as needed for Pain 60 tablet 5    glipiZIDE (GLUCOTROL) 5 MG tablet Take 2 tablets by mouth 2 times daily      metFORMIN (GLUCOPHAGE) 500 MG tablet Take 2 tablets by mouth 2 times daily      vitamin E 600 units capsule Take 600 Units by mouth daily      vitamin C (ASCORBIC ACID) 500 MG tablet Take 500 mg by mouth daily      Multiple Vitamins-Minerals (MULTIVITAMIN PO) Take by mouth      Biotin 5000 MCG TABS Take by mouth      Omega-3 Fatty Acids (OMEGA 3 PO) Take by mouth      pioglitazone (ACTOS) 45 MG tablet Take 45 mg by mouth daily. enalapril (VASOTEC) 20 MG tablet Take 20 mg by mouth 2 times daily. Recent shoulder repair at [employer].      Allergies: No Known Allergies      Social History:   Tobacco use:  none  Alcohol use:  social drinker  Illicit drug use:  no history of illicit drug use    Past Surgical History:    Past Surgical History:   Procedure Laterality Date    ABDOMEN SURGERY      hysterectomy    BACK SURGERY      BACK SURGERY      3 back surgerys 3-4 year ago    BREAST BIOPSY Right     stereotactic    CARPAL TUNNEL RELEASE  2019 Left    HYSTERECTOMY (CERVIX STATUS UNKNOWN)      SHOULDER SURGERY      RTC, B    SPINAL FUSION      lumbar    TONSILLECTOMY           Anticoagulant use: None  Antiplatelet use: Other ibuprofen    NSAID use in last 72 hours: yes  Taken PCN in past:  yes  Last food/drink: today  Last tetanus: unknown     Family History:   No family history of anesthesia complications    Complaints:   Head:  Mild  Neck:   None  Chest:   None  Back:   None  Abdomen:   None  Extremities:   Moderate  Comments: RUE splinted in sling, pain controlled     Review of systems:  All negative unless otherwise noted. SECONDARY SURVEY  Head/scalp: Atraumatic    Face: R periorbital edema and ecchymosis. Small abrasions     Eyes/ears/nose:R conjunctival hemorrhage    Pharynx/mouth: Atraumatic    Neck: Atraumatic     Cervical spine tenderness:   Cervical collar not indicated  Pain:  none  ROM:  Full    Chest wall:  Atraumatic    Heart:  Tachycardic regular rhythm    Abdomen: Atraumatic. Soft ND  Tenderness:  none    Pelvis: Atraumatic  Tenderness: none    Thoracolumbar spine: Atraumatic  Tenderness:  none    Genitourinary:  Atraumatic. No blood or urine noted    Rectum: Atraumatic. No blood noted. Perineum: Atraumatic. No blood or urine noted. Extremities:   Sensory normal  Motor normal    RUE in sling, pain with movement. RLE knee abrasion, no deep tenderness or active ROM tenderness    Distal Pulses  Left arm normal  Right arm normal  Left leg normal  Right leg normal    Capillary refill  Left arm normal  Right arm normal  Left leg normal  Right leg normal    Procedures in ED:   none    In the event of Emergency Blood Transfusion:  Due to the critical condition of this patient, I request the immediate release of blood products for emergency transfusion secondary to shock. I understand the increased risks incurred by the lack of complete transfusion testing.       Radiology: Chest Xray, Pelvic Xray, Ct head, Ct cervical spine, CT chest, CT abdomen    Consultations:  Orthopedic surgery and Neurosurgery    Admission/Diagnosis: Trauma     Plan of Treatment:  Review scans from [facility] when uploaded  Telemetry monitoring   Repeat CT Head   Orthopedic Surgery consult   Neurosurgery consult   BP control sbp <140 PRNs, hold home norvasc for now  Pain and nausea control   Ambulate as able   PT/OT  Ok for diet when cleared by orthopedic surgery  SCDs  ICS, pulm hygiene   Keppra BID x 7 days   Local wound care to abrasions     Plan discussed with Dr. Ganga Ryan    at 2/12/2023 on 12:22 PM    Electronically signed by Erma Méndez MD on 2/12/2023 at 12:22 PM

## 2023-02-12 NOTE — PROGRESS NOTES
Orthopedic surgical consent sent via [facility] serve to Dr. David Harrison.

## 2023-02-13 LAB
ANION GAP SERPL CALCULATED.3IONS-SCNC: 11 MMOL/L (ref 7–16)
BASOPHILS ABSOLUTE: 0.02 E9/L (ref 0–0.2)
BASOPHILS RELATIVE PERCENT: 0.3 % (ref 0–2)
BUN BLDV-MCNC: 32 MG/DL (ref 6–23)
CALCIUM SERPL-MCNC: 8.8 MG/DL (ref 8.6–10.2)
CHLORIDE BLD-SCNC: 107 MMOL/L (ref 98–107)
CO2: 23 MMOL/L (ref 22–29)
CREAT SERPL-MCNC: 1 MG/DL (ref 0.5–1)
EOSINOPHILS ABSOLUTE: 0.03 E9/L (ref 0.05–0.5)
EOSINOPHILS RELATIVE PERCENT: 0.4 % (ref 0–6)
GFR SERPL CREATININE-BSD FRML MDRD: 59 ML/MIN/1.73
GLUCOSE BLD-MCNC: 155 MG/DL (ref 74–99)
HCT VFR BLD CALC: 29.4 % (ref 34–48)
HEMOGLOBIN: 10.1 G/DL (ref 11.5–15.5)
IMMATURE GRANULOCYTES #: 0.02 E9/L
IMMATURE GRANULOCYTES %: 0.3 % (ref 0–5)
LYMPHOCYTES ABSOLUTE: 1.34 E9/L (ref 1.5–4)
LYMPHOCYTES RELATIVE PERCENT: 19.2 % (ref 20–42)
MCH RBC QN AUTO: 30.7 PG (ref 26–35)
MCHC RBC AUTO-ENTMCNC: 34.4 % (ref 32–34.5)
MCV RBC AUTO: 89.4 FL (ref 80–99.9)
METER GLUCOSE: 144 MG/DL (ref 74–99)
METER GLUCOSE: 218 MG/DL (ref 74–99)
METER GLUCOSE: 230 MG/DL (ref 74–99)
METER GLUCOSE: 242 MG/DL (ref 74–99)
MONOCYTES ABSOLUTE: 0.59 E9/L (ref 0.1–0.95)
MONOCYTES RELATIVE PERCENT: 8.5 % (ref 2–12)
NEUTROPHILS ABSOLUTE: 4.98 E9/L (ref 1.8–7.3)
NEUTROPHILS RELATIVE PERCENT: 71.3 % (ref 43–80)
PDW BLD-RTO: 13.2 FL (ref 11.5–15)
PLATELET # BLD: 197 E9/L (ref 130–450)
PMV BLD AUTO: 9.9 FL (ref 7–12)
POTASSIUM REFLEX MAGNESIUM: 3.9 MMOL/L (ref 3.5–5)
RBC # BLD: 3.29 E12/L (ref 3.5–5.5)
SODIUM BLD-SCNC: 141 MMOL/L (ref 132–146)
WBC # BLD: 7 E9/L (ref 4.5–11.5)

## 2023-02-13 PROCEDURE — 36415 COLL VENOUS BLD VENIPUNCTURE: CPT

## 2023-02-13 PROCEDURE — 80048 BASIC METABOLIC PNL TOTAL CA: CPT

## 2023-02-13 PROCEDURE — 6370000000 HC RX 637 (ALT 250 FOR IP)

## 2023-02-13 PROCEDURE — 97165 OT EVAL LOW COMPLEX 30 MIN: CPT

## 2023-02-13 PROCEDURE — 85025 COMPLETE CBC W/AUTO DIFF WBC: CPT

## 2023-02-13 PROCEDURE — 2580000003 HC RX 258

## 2023-02-13 PROCEDURE — 97530 THERAPEUTIC ACTIVITIES: CPT

## 2023-02-13 PROCEDURE — 2060000000 HC ICU INTERMEDIATE R&B

## 2023-02-13 PROCEDURE — 97535 SELF CARE MNGMENT TRAINING: CPT

## 2023-02-13 PROCEDURE — 82962 GLUCOSE BLOOD TEST: CPT

## 2023-02-13 PROCEDURE — 97161 PT EVAL LOW COMPLEX 20 MIN: CPT

## 2023-02-13 RX ORDER — ENALAPRIL MALEATE 10 MG/1
20 TABLET ORAL 2 TIMES DAILY
Status: DISCONTINUED | OUTPATIENT
Start: 2023-02-13 | End: 2023-02-16 | Stop reason: HOSPADM

## 2023-02-13 RX ORDER — SODIUM CHLORIDE 9 MG/ML
INJECTION, SOLUTION INTRAVENOUS CONTINUOUS
Status: DISCONTINUED | OUTPATIENT
Start: 2023-02-13 | End: 2023-02-16 | Stop reason: HOSPADM

## 2023-02-13 RX ORDER — AMLODIPINE BESYLATE 5 MG/1
5 TABLET ORAL DAILY
Status: DISCONTINUED | OUTPATIENT
Start: 2023-02-13 | End: 2023-02-16 | Stop reason: HOSPADM

## 2023-02-13 RX ORDER — SODIUM CHLORIDE, SODIUM LACTATE, POTASSIUM CHLORIDE, CALCIUM CHLORIDE 600; 310; 30; 20 MG/100ML; MG/100ML; MG/100ML; MG/100ML
INJECTION, SOLUTION INTRAVENOUS CONTINUOUS
Status: DISCONTINUED | OUTPATIENT
Start: 2023-02-13 | End: 2023-02-13

## 2023-02-13 RX ADMIN — SODIUM CHLORIDE: 9 INJECTION, SOLUTION INTRAVENOUS at 13:58

## 2023-02-13 RX ADMIN — BACITRACIN ZINC: 500 OINTMENT TOPICAL at 21:51

## 2023-02-13 RX ADMIN — LEVETIRACETAM 500 MG: 500 TABLET, FILM COATED ORAL at 10:28

## 2023-02-13 RX ADMIN — ACETAMINOPHEN 650 MG: 325 TABLET ORAL at 13:51

## 2023-02-13 RX ADMIN — BACITRACIN ZINC: 500 OINTMENT TOPICAL at 10:34

## 2023-02-13 RX ADMIN — SODIUM CHLORIDE, PRESERVATIVE FREE 10 ML: 5 INJECTION INTRAVENOUS at 20:45

## 2023-02-13 RX ADMIN — INSULIN LISPRO 2 UNITS: 100 INJECTION, SOLUTION INTRAVENOUS; SUBCUTANEOUS at 21:51

## 2023-02-13 RX ADMIN — INSULIN LISPRO 2 UNITS: 100 INJECTION, SOLUTION INTRAVENOUS; SUBCUTANEOUS at 18:16

## 2023-02-13 RX ADMIN — INSULIN LISPRO 2 UNITS: 100 INJECTION, SOLUTION INTRAVENOUS; SUBCUTANEOUS at 13:58

## 2023-02-13 RX ADMIN — LEVETIRACETAM 500 MG: 500 TABLET, FILM COATED ORAL at 20:45

## 2023-02-13 RX ADMIN — ACETAMINOPHEN 650 MG: 325 TABLET ORAL at 18:16

## 2023-02-13 RX ADMIN — SODIUM CHLORIDE, PRESERVATIVE FREE 10 ML: 5 INJECTION INTRAVENOUS at 10:29

## 2023-02-13 RX ADMIN — AMLODIPINE BESYLATE 5 MG: 5 TABLET ORAL at 10:29

## 2023-02-13 RX ADMIN — ENALAPRIL MALEATE 20 MG: 10 TABLET ORAL at 10:29

## 2023-02-13 RX ADMIN — BACITRACIN ZINC: 500 OINTMENT TOPICAL at 14:01

## 2023-02-13 RX ADMIN — ACETAMINOPHEN 650 MG: 325 TABLET ORAL at 05:08

## 2023-02-13 ASSESSMENT — PAIN DESCRIPTION - ONSET
ONSET: ON-GOING
ONSET: ON-GOING

## 2023-02-13 ASSESSMENT — PAIN DESCRIPTION - ORIENTATION
ORIENTATION: RIGHT

## 2023-02-13 ASSESSMENT — PAIN DESCRIPTION - DESCRIPTORS
DESCRIPTORS: ACHING;DISCOMFORT;SORE

## 2023-02-13 ASSESSMENT — PAIN DESCRIPTION - LOCATION
LOCATION: ARM

## 2023-02-13 ASSESSMENT — PAIN SCALES - GENERAL
PAINLEVEL_OUTOF10: 4
PAINLEVEL_OUTOF10: 3
PAINLEVEL_OUTOF10: 6
PAINLEVEL_OUTOF10: 3

## 2023-02-13 ASSESSMENT — PAIN DESCRIPTION - PAIN TYPE
TYPE: ACUTE PAIN

## 2023-02-13 ASSESSMENT — PAIN - FUNCTIONAL ASSESSMENT
PAIN_FUNCTIONAL_ASSESSMENT: PREVENTS OR INTERFERES SOME ACTIVE ACTIVITIES AND ADLS
PAIN_FUNCTIONAL_ASSESSMENT: PREVENTS OR INTERFERES SOME ACTIVE ACTIVITIES AND ADLS

## 2023-02-13 ASSESSMENT — PAIN DESCRIPTION - FREQUENCY
FREQUENCY: CONTINUOUS
FREQUENCY: CONTINUOUS

## 2023-02-13 NOTE — ACP (ADVANCE CARE PLANNING)
Advance Care Planning   Healthcare Decision Maker:    Primary Decision Maker (Active): Rashida Farrar - Niece/Nephew - [phone]    Secondary Decision Maker: Jerrica Ulrich - Niece/Nephew - [phone]    Supplemental (Other) Decision Maker: Richy Buck - Niece/Nephew - [phone]    Click here to complete Healthcare Decision Makers including selection of the Healthcare Decision Maker Relationship (ie \"Primary\").

## 2023-02-13 NOTE — PROGRESS NOTES
Physical Therapy  Physical Therapy Initial Assessment     Name: Bailey Mitchell  : 1945  MRN: [id number]      Date of Service: 2023    Evaluating PT:  Rhonda Castillo, PT, DPT [id number]    Room #:  7557/7609-M  Diagnosis:  Trauma [T14.90XA]  Subdural hematoma [S06. 5XAA]  Closed supracondylar fracture of right humerus, initial encounter [S42.411A]  PMHx/PSHx:   has a past medical history of Arthritis, Carpal tunnel syndrome, Chronic back pain, Corns and callosities, Diabetes mellitus ([facility] [address] Fibromyositis, Hyperlipidemia, Hypertension, Onychomycosis, Osteoarthritis, Osteoporosis, Peripheral vascular disease ([facility] [address] Sciatica, and Type 2 diabetes mellitus without complication ([facility] [address] has a past surgical history that includes back surgery; Hysterectomy; Tonsillectomy; back surgery; Breast biopsy (Right); Abdomen surgery; Carpal tunnel release (2019); shoulder surgery; and Spinal fusion. Procedure/Surgery:  None  Precautions:  Falls, NWB RUE, RUE sling  Equipment Needs:  TBD  Equipment Owned:  Cane, walker, wheelchair    SUBJECTIVE:    Pt lives alone in a 2 story home with 4 stair(s) to enter and 1 rail(s). Bed is on 2nd floor and bath is on 2nd floor. Full flight of stairs with 1 rail to 2nd floor. Pt ambulated with no AD PTA. OBJECTIVE:   Initial Evaluation  Date: 2023 Treatment Short Term/ Long Term   Goals   AM-PAC 6 Clicks 78/54     Was pt agreeable to Eval/treatment? Yes     Does pt have pain? RUE discomfort     Bed Mobility  Rolling: NT  Supine to sit: SBA  Sit to supine: SBA  Scooting: NT  Rolling: Independent  Supine to sit: Independent  Sit to supine: Independent  Scooting: Independent   Transfers Sit to stand: SBA  Stand to sit: SBA  Stand pivot: NT  Sit to stand: Independent  Stand to sit:  Independent  Stand pivot: Independent with no AD   Ambulation    250 feet with no AD SBA  500 feet with no AD Independent   Stair negotiation: ascended and descended  NT  12 step(s) with 1 rail(s) or with 0 rails + AAD Mod I   ROM BUE:  See OT note  BLE:  WFL     Strength BUE:  See OT note  BLE:  Grossly 5/5     Balance Sitting EOB:  Independent  Dynamic Standing:  SBA with no AD  Sitting EOB:  Independent  Dynamic Standing:  Independent with no AD     Pt is A & O x 4  Sensation:  Pt denies numbness/tingling to extremities  Edema:  Unremarkable    Vitals:   HR 78, SpO2 98% during ambulation. Patient education  Pt educated on role of PT, weight bearing status, use of call light for assistance. Patient response to education:   Pt verbalized understanding Pt demonstrated skill Pt requires further education in this area   Yes Yes Reinforcement     ASSESSMENT:    Conditions Requiring Skilled Therapeutic Intervention:    []Decreased strength     []Decreased ROM  [x]Decreased functional mobility  []Decreased balance   []Decreased endurance   []Decreased posture  []Decreased sensation  []Decreased coordination   []Decreased vision  []Decreased safety awareness   [x]Increased pain       Comments:  Patient in semi-Pond's position with RUE sling doffed upon arrival; agreeable to PT session. RUE sling donned sitting EOB. Ambulated with decreased speed and steadiness. Vitals monitored and noted. Patient left in semi-Pond's position with call light in reach. Patient would benefit from continued skilled PT services to address functional deficits and prevent deconditioning. Treatment:  Patient practiced and was instructed in the following treatment:    Bed mobility - physical assistance provided as needed during activity  Functional transfers - physical assistance provided as needed during activity  Ambulation - physical assistance provided as needed during activity  Skilled monitoring of vitals    Pt's/ family goals   1. None stated    Prognosis is good for reaching above PT goals. Patient and or family understand(s) diagnosis, prognosis, and plan of care.   Yes    PHYSICAL THERAPY PLAN OF CARE:    PT POC is established based on physician order and patient diagnosis     Referring provider/PT Order:    02/12/23 1245  PT evaluation and treat  Start:  02/12/23 1245,   End:  02/12/23 1245,   ONE TIME,   Standing Count:  1 Occurrences,   Kimberly Gardiner MD     Diagnosis:  Trauma [T14.90XA]  Subdural hematoma [S06. 5XAA]  Closed supracondylar fracture of right humerus, initial encounter [S42.411A]  Specific instructions for next treatment:  Continue to facilitate safe performance of functional mobility; progress as appropriate. Current Treatment Recommendations:     [x] Strengthening to improve independence with functional mobility   [] ROM to improve independence with functional mobility   [x] Balance Training to improve static/dynamic balance and to reduce fall risk  [x] Endurance Training to improve activity tolerance during functional mobility   [x] Transfer Training to improve safety and independence with all functional transfers   [x] Gait Training to improve gait mechanics, endurance and assess need for appropriate assistive device  [x] Stair Training in preparation for safe discharge home and/or into the community   [x] Positioning to prevent skin breakdown and contractures  [x] Safety and Education Training   [x] Patient/Caregiver Education   [] HEP  [] Other     PT long term treatment goals are located in above grid    Frequency of treatments: 2-5x/week x 1-2 weeks. Time in  1610  Time out  1630    Total Treatment Time  10 minutes     Evaluation Time includes thorough review of current medical information, gathering information on past medical history/social history and prior level of function, completion of standardized testing/informal observation of tasks, assessment of data and education on plan of care and goals.     CPT codes:  [x] Low Complexity PT evaluation 81985  [] Moderate Complexity PT evaluation 13597  [] High Complexity PT evaluation 72993  [] PT Re-evaluation 76323  [] Gait training 51111 0 minutes  [] Manual therapy 72189 0 minutes  [x] Therapeutic activities 77254 10 minutes  [] Therapeutic exercises 76347 0 minutes  [] Neuromuscular reeducation 82247 0 minutes     Tammy Allen, PT, DPT  [id number]

## 2023-02-13 NOTE — PROGRESS NOTES
Trauma Tertiary Survey    Admit Date: 2/12/2023  Hospital day 1    CC:  Fall down three steps    Alcohol pre-screening:  Women: How many times in the past year have you had 4 or more drinks in a day? none  How much do you drink on a daily basis? Does not drink daily    Drug Pre-screening:    How many times in the past year have you used a recreational drug or used a prescription medication for non medical reasons? No    Mood Prescreening:    During the past two weeks, have you been bothered by little interest or pleasure doing things? No  During the past two weeks, have you been bothered by feeling down, depressed or hopeless? No      Scheduled Meds:   sodium chloride flush  10 mL IntraVENous 2 times per day    polyethylene glycol  17 g Oral Daily    bisacodyl  10 mg Rectal Daily    levETIRAcetam  500 mg Oral BID    acetaminophen  650 mg Oral Q6H    bacitracin zinc   Topical TID    escitalopram  10 mg Oral Daily    insulin lispro  0-8 Units SubCUTAneous Q4H     Continuous Infusions:   sodium chloride      dextrose       PRN Meds:sodium chloride flush, sodium chloride, ondansetron **OR** ondansetron, oxyCODONE, labetalol, hydrALAZINE, glucose, dextrose bolus **OR** dextrose bolus, glucagon (rDNA), dextrose    Subjective:     Pt only complains of right arm pain, no other concerns at this time. Objective:   Patient Vitals for the past 8 hrs:   BP Temp Temp src Pulse Resp SpO2   02/12/23 2205 139/77 97.7 °F (36.5 °C) Temporal 89 20 97 %       No intake/output data recorded. No intake/output data recorded.     Past Medical History:   Diagnosis Date    Arthritis     Carpal tunnel syndrome     Chronic back pain     Corns and callosities     Diabetes mellitus ([address]     Fibromyositis     Hyperlipidemia     Hypertension     Onychomycosis     Osteoarthritis     Osteoporosis     Peripheral vascular disease ([address]     Sciatica     Type 2 diabetes mellitus without complication ([facility] [address]        @[email or web address]@    Radiology:  CT ELBOW RIGHT WO CONTRAST   Final Result   1. There is a supracondylar fracture of the distal right humerus. The   fracture has a predominantly transverse orientation with slight displacement. 2.  The distal humeral and ulnar articulation and the radiocapitellar   articulations appear maintained on these images. 3.  There is soft tissue stranding and edema about the right elbow joint. There is a joint effusion. CT 3D RECONSTRUCTION   Final Result   1. There is a supracondylar fracture of the distal right humerus. The   fracture has a predominantly transverse orientation with slight displacement. 2.  The distal humeral and ulnar articulation and the radiocapitellar   articulations appear maintained on these images. 3.  There is soft tissue stranding and edema about the right elbow joint. There is a joint effusion. CT HEAD WO CONTRAST   Final Result   Stable right subdural hematoma. No mass effect. No new intracranial   hemorrhage. PHYSICAL EXAM:     Central Nervous System  Loss of consciousness:  No    GCS:    Eye:  4 - Opens eyes on own  Motor:  6 - Follows simple motor commands  Verbal:  5 - Alert and oriented    Neuromuscular blockade: No  Pupil size:  Left 3 mm    Right 3 mm  Pupil reaction: Yes    Wiggles fingers: Left Yes Right Yes  Wiggles toes: Left Yes   Right Yes    Hand grasp:   Left  Present      Right  Present  Plantar flexion: Left  Present      Right   Present    PHYSICAL EXAM  General: No apparent distress, comfortable  HEENT: Trachea midline, no masses, Pupils equal round. R periorbital ecchymosis, abrasions  Chest: Respiratory effort was normal with no retractions or use of accessory muscles. Cardiovascular: Extremities warm, well perfused  Abdomen:  Soft and non distended. No tenderness, guarding, rebound, or rigidity  Extremities: No pedal edema. R knee w abrasions, dressing in place.  RUE splinted and in sling     Spine:   Spine Tenderness ROM Cervical 0/10 Normal   Thoracic 0/10 Normal   Lumbar 0 /10 Normal     Musculoskeletal:    Joint Tenderness Swelling ROM   Right shoulder Absent absent normal   Left shoulder absent absent normal   Right elbow present present reduced   Left elbow absent absent normal   Right wrist absent absent normal   Left wrist absent absent normal   Right hand grasp Absent absent normal   Left hand grasp absent absent normal   Right hip absent absent normal   Left hip absent absent normal   Right knee Present absent normal   Left knee absent absent normal   Right ankle absent absent normal   Left ankle absent absent normal   Right foot Absent absent normal   Left foot absent absent normal       CONSULTS: Orthopedic surgery and Neurosurgery    PROCEDURES:     INJURIES:      Principal Problem:    Trauma  Active Problems:    Subdural hematoma  Resolved Problems:    * No resolved hospital problems. *        Assessment/Plan:       Neuro:  GCS 15, SDH on Keppra 500 bid, NSGY consult  CV: HR near normal limits, no acute issues  Pulm: tolerating room air    GI: NPO for possible OR w ortho 2/13  Renal: no acute issues  ID: afebrile, no acute issues    Endocrine: no acute issues  MSK: distal R humerus fx, ortho consulted, NWB RUE, possible OR  Heme: no acute issues     Bowel regime: dulcolax, glycolax  Pain control/Sedation: tylenol, roxicodone  DVT prophylaxis: SCD's    GI: diet NPO  Glucose protocol: low dose SSI  Mouth/Eye care: per patient. [place]: none     Code status:    Limited    Patient/Family update:  As available     Disposition:   PT/OT  Discharge planning      Electronically signed by Maurilio Mcclendon DO on 2/13/23 at 6:04 AM EST       Attending Physician Statement:  I have examined the patient, reviewed the record, and discussed the case with the surgical team.  I agree with the assessment and plan with the following additions, corrections, and changes.  at bedside  Patient on OR schedule for this Thursday.    Will have PT/OT evaluate preop  May end up inpatient for next 3 days.     Electronically signed by Nini Capellan MD on 2/13/23 at 12:13 PM EST

## 2023-02-13 NOTE — CONSULTS
[address]                  [address] [place],  [facility]                                  CONSULTATION    PATIENT NAME: Abraham Clemons                     :        1945  MED REC NO:   [id number]                            ROOM:       4504  ACCOUNT NO:   [de-identified]                           ADMIT DATE: 2023  PROVIDER:     Pedro Luis Hawkins MD    CONSULT DATE:  2023    REASON FOR CONSULT:  Right-sided subdural hematoma. HISTORY OF PRESENT ILLNESS:  The patient is a 68-year-old lady who lost  her footing, fell earlier today when she was taken her dog out. There  was no loss of consciousness. Immediately after the fall, she was  complaining of right upper extremity pain and mild headache. Denied any  new numbness, tingling, weakness, or loss of control of bowel or bladder  function. She subsequently was seen here at [facility]  where a CT scan of her head showed a right-sided acute subdural  hematoma. As a result of that, Neurosurgery Service was consulted. PAST MEDICAL HISTORY:  Positive for hypertension and diabetes, chronic  back pain, arthritis, hyperlipidemia, osteoporosis, peripheral vascular  disease. PAST SURGICAL HISTORY:  Positive for cholecystectomy, right shoulder  surgery, lumbar fusion, carpal tunnel release, hysterectomy,  tonsillectomy. FAMILY HISTORY:  Positive for heart disease in her mother and diabetes  in her brothers and father. ALLERGIES:  She has no known drug allergies. SOCIAL HISTORY:  Positive for social consumption of alcoholic beverages. HOME MEDICATIONS:  Include ibuprofen. REVIEW OF SYSTEMS:  HEENT:  Positive for headache, but negative for double vision, blurry  vision. CARDIOVASCULAR:  Negative for chest pain, arrhythmia, or palpitations. RESPIRATORY:  Negative for shortness of breath, asthma, bronchitis, or  pneumonia.   GASTROINTESTINAL:  Positive for nausea. GENITOURINARY:  Negative for hematuria or dysuria. HEMATOLOGIC:  Positive for easy bruising. INFECTIOUS DISEASE:  Negative for any recent infection. MUSCULOSKELETAL:  Positive for joint stiffness and swelling. PSYCHIATRIC:  Negative for anxiety or depression. NEUROLOGIC:  Negative for seizure or stroke. ENDOCRINE:  Positive for diabetes. PHYSICAL EXAMINATION:  VITAL SIGNS:  She is currently afebrile with a T-current of 36.2 degrees  Celsius, pulse 85, blood pressure is 99/44, respiratory rate is 22. GENERAL:  She is resting in bed. Does not appear to be in any acute  distress. Appears her stated age. HEENT:  Her head is normocephalic with evidence of trauma as manifested  by periorbital ecchymosis and edema. She has no drainage out of her  eyes, ears, nose, or throat. SKIN:  Warm and dry. MUSCULOSKELETAL:  She has got decreased range of motion in her right  upper extremity as it is immobilized in a cast.  ABDOMEN:  Abdomen is soft, nontender, nondistended. RESPIRATORY:  She is not using any accessory muscles of respiration. NEUROLOGIC:  On rest of her neurologic exam, she is awake, alert, and  oriented x3. Cranial nerves II through XII are intact bilaterally. Motor exam reveals 4+/5 strength in bilateral lower extremities and her  left upper extremity. Right upper extremity is immobilized. Sensation  is grossly intact to light touch. Reflexes are 1+ and symmetric. Toes  are going down. LABORATORY DATA:  Sodium 138, potassium is 3.7, BUN is 34, creatinine  1.0. REVIEW OF IMAGING:  CT scan of her head shows a right-sided subdural  hematoma. ASSESSMENT AND PLAN:  The patient is a 72-year-old lady who has  right-sided subdural hematoma. She is neurologically stable. No  intervention is required at this time. She is to be off all  antiplatelet, anticoagulation therapy. We will monitor her neurologic  exam with serial neurologic exams and serial imaging.   We will place her  on Keppra 500 mg b.i.d. for seven days.         Cynthia Landers MD    D: 02/12/2023 16:58:12       T: 02/12/2023 17:00:40     [email or web address]  Job#: [id number]     Doc#: [id number]    CC:

## 2023-02-13 NOTE — PROGRESS NOTES
[address]                                                  Patient Name: Olivia Madrid  MRN: [id number]  : 1945  Room: [address] [place] [address]    Evaluating OT: Maple Blizzard, MOT, OTR/L  # [id number]    Referring Provider:  Segundo See MD  Specific Provider Orders:  \"OT Eval and Treat\"  23    Diagnosis: Trauma [T14.90XA]  Subdural hematoma [S06. 5XAA]  Closed supracondylar fracture of right humerus, initial encounter [S42.879A]    Pt was admitted after falling down steps. Pertinent Medical History:  Pt has a past medical history of Arthritis, Carpal tunnel syndrome, Chronic back pain, Corns and callosities, Diabetes mellitus ([address] Fibromyositis, Hyperlipidemia, Hypertension, Onychomycosis, Osteoarthritis, Osteoporosis, Peripheral vascular disease ([address] Sciatica, and Type 2 diabetes mellitus without complication ([address] ,  has a past surgical history that includes back surgery; Hysterectomy; Tonsillectomy; back surgery; Breast biopsy (Right); Abdomen surgery; Carpal tunnel release (2019); shoulder surgery; and Spinal fusion.     Surgeries this admission: Tentatively scheduled for surgical repair of Right distal humerus fracture on 2-15-23    Precautions:  Fall Risk  NWB R UE, sling    Assessment of current deficits   [x] Functional mobility  [x]ADLs  [x] Strength               [x]Cognition   [x] Functional transfers   [x] IADLs         [x] Safety Awareness   [x]Endurance   [] Fine Coordination              [x] Balance     [] Vision/perception   []Sensation    []Gross Motor Coordination  [x] ROM  [] Delirium                  [] Motor Control       OT PLAN OF CARE   OT POC based on physician orders, patient diagnosis and results of clinical assessment    Frequency/Duration 1-3 days/wk for 2 weeks PRN   Specific OT Treatment to include:     * Instruction/training on adapted ADL techniques and AE recommendations to increase functional independence within precautions       * Training on energy conservation strategies, correct breathing pattern and techniques to improve independence/tolerance for self-care routine  * Functional transfer/mobility training/DME recommendations for increased independence, safety, and fall prevention  * Patient/Family education to increase follow through with safety techniques and functional independence  * Recommendation of environmental modifications for increased safety with functional transfers/mobility and ADLs  * Cognitive retraining/development of therapeutic activities to improve problem solving, judgement, memory, and attention for increased safety/participation in ADL/IADL tasks  * Therapeutic exercise to improve motor endurance, ROM, and functional strength for ADLs/functional transfers  * Therapeutic activities to facilitate/challenge dynamic balance, stand tolerance for increased safety and independence with ADLs  * Therapeutic activities to facilitate gross/fine motor skills for increased independence with ADLs  * Neuro-muscular re-education: facilitation of righting/equilibrium reactions  * Positioning to improve skin integrity, interaction with environment and functional independence  * Delirium prevention/treatment  * Manual techniques for edema management  Other:    Modified Elle Scale (MRS)  Score     Description  0             No symptoms  1             No significant disability despite symptoms  2             Slight disability; able to look after own affairs  3             Moderate disability; able to ambulate without assist/ requires assist with ADLs  4             Moderate/Severe disability;requires assist to ambulate/assist with ADLs  5             Severe disability;bedridden/incontinent   6               Score:   3    Recommended Adaptive Equipment: TBD as pt progresses       Home Living:  [facility]. Bedroom on rhe 2nd floor, Full bath on 1st and 2nd floor.  (+) Basement. Bathroom setup:  Walk-in-Shower on 1st floor, Tub-Shower on 2nd floor - Pt prefers walk-in shower,  [address] Com[address]   Equipment owned:  [facility], [employer], [facility], Shower Chair, [facility], [employer], [employer]    Available Family Assist:  S.O. can assist PRN    Prior Level of Function:  Pt reported being IND w/ all ADLs, IADLs, Transfers and Mobility using No AD for ambulation. Driving:  Yes - shopping, outings, errands  Occupation:  None reported    Pain Level:  Denied pain at rest, increased to 3/10 w/ standing ax, decreased to 1/10 at rest at end of session  Nsg Notified   Additional Complaints:  None    Cognition: A & O x 4   Able to Follow Multi-Step Commands w/ Min VCs   Memory:  good (-)   Sequencing:  good (-)   Problem solving:  good (-)   Judgement/safety:  good (-)  Additional Comments:  Pt was pleasant and cooperative.   Fair insight     Vitals/Lab Values:  WFL Room air           Functional Assessment:  AM-PAC Daily Activity Raw Score: 15/24     Initial Eval Status  Date: 2-13-23   Treatment Status  Date: STGs = LTGs  Time frame: 10-14 days   Feeding SUP/Set up      Mod I   Grooming Min A/Set up    Min A for hair-care   Able to wash hands/face/complete oral care standing at sink    Mod I  Standing at the [facility] A/Set up    Donning/doffing gown standing  Pt ed for adaptive tech    Mod I     LB Dressing Mod A/Set up    Donning/doffing socks, undergarments, pants seated/standing  Pt ed for safe/adaptive techs, use of adaptive equip    Mod I     Bathing Mod A/Set up    Sponge-bathing seated/standing     Pt ed re: Benefits of use of Shower chair/Tub Bench, resources for obtaining equip; adaptive techs    Mod I      Toileting Min A/Set up    Hygiene - seated  Min A to adjust clothing over hips    Mod I     Bed Mobility  Supine to sit: NT   Sit to supine:  NT    Supine to sit: IND  Sit to supine: IND     Functional Transfers SUP    EOB, Chair, Commode  Multiple trials  Pt ed for safety/hand placement    Mod I     Functional Mobility SUP    Household distances in room, bathroom w/o AD  Pt ed for safety/improved safety awareness    Mod I     Balance Sitting:     Static:  Remote SUP    Dynamic:  SUP w/ functional ax EOB/Commode     Standing:     Static:  SUP    Dynamic:  SUP w/ functional ax/mobility w/o AD    Sitting:     Static:  IND    Dynamic:  IND w/ functional ax    Standing:     Static:  IND w/ AD PRN    Dynamic:  IND w/ functional ax/mobility w/ AD PRN   Activity Tolerance Fair(+)        Good   Visual/  Perceptual    Hearing: WNL   Glasses: Reading    WFL   Hearing Aids:  no               Hand Dominance: RIGHT   AROM Strength Additional Info:    RUE  Shoulder WFL - Fairly recent hx of Rotator cuff repair  Elbow/wrist - limited by Splint  Able to use digits within limitations of splint placement   NT/5 Fair ;   Fair(+) FMC/dexterity noted during ADL tasks  Limited by placement of splint   LUE WFL WNL Good ;    Good(-) FMC/dexterity noted during ADL tasks       Sensation:  Denies numbness or tingling Alejandro UEs   Tone: WFL Alejandro UEs   Edema: None Noted Alejandro UEs     Comments: Upon arrival, patient was found seated EOB. She was agreeable to participate in therapeutic ax. Her S. O. was present during session. Received permission from RN prior to engaging pt in OT services. Educated pt on role of OT services. Pt has hx of Alejandro Rotator cuff repair - aware of general precautions, donning/doffing sling, one-handed techs for ADLs/Functional ax    At the end of the session, patient was properly positioned in b/s chair - R UE supported w/ Pillow, sling doffed. Call light and phone within reach, all lines and tubes intact. Oriented pt to call bell. Made all appropriate Environmental Modifications to facilitate pt's level of IND and safety. All needs met. S.O. at b/s.   RN made aware of pt's position/performance         Overall patient demonstrated decreased independence and safety during completion of ADL/functional transfer/mobility tasks. Pt would benefit from continued skilled OT to increase safety and independence with completion of ADL/IADL tasks for functional independence and quality of life. Treatment: OT treatment provided this date includes:   Instruction/training on safety and adapted techniques for completion of ADLs, use of DME/AD/Adaptive equip:    Instruction/training on safe functional mobility/transfer techniques, use of DME/AD:    Instruction/training on energy conservation techs (EC)/Work Simplification/Pursed-Lip Breathing (PLB) for completion of ADLs:     Neuromuscular Reeducation to facilitate balance/righting reactions for increased function with ADLs:    Skilled positioning/alignment for Pain Mgmt, Skin Integrity, Edema Control, to maximize Pt's safety and ability to safely and INDly interact w/ his/her environment, maximize respiratory status  Activity tolerance - Sitting/Standing to improve endurance w/ functional ax   Cognitive retraining -  Oriented pt to current Date, Place and Situation; Cues for safety/safety awareness, sequencing, problem solving    Skilled monitoring of Vitals during session and pt's response to tx ax    Therapeutic Exercises- Instruction on BUE exercises/ROM to improve strength and function of BUE for improved indep with ADLs    Neuromuscular Facilitation of  UE functional movement/ROM/fine motor dexterity     Consulted RN, Family    Made all appropriate Environmental Modifications to facilitate pt's level of IND and safety. Recommendations for Continued Participation in OT services during Hospitalization and at D/C     Pt and/or Family verbalized/demonstrated a Good(-) understanding of education provided. Will Review PRN.          Rehab Potential: Good for established goals     Patient / Family Goal: Return Home ASAP       Patient and/or family were instructed on functional diagnosis, prognosis/goals and OT plan of care. Demonstrated Good(-) understanding. Eval Complexity: Low    Time In: 1059  Time Out: 1152  Total Treatment Time: 39 minutes    Min Units   OT Eval Low 97165  X  1   OT Eval Medium 69854      OT Eval High 64312      OT Re-Eval [id number]       Therapeutic Ex 79259       Therapeutic Activities 01251       ADL/Self Care 96766  39  3   Orthotic Management 54948       Manual [id number]     Neuro Re-Ed 75339       Non-Billable Time              Evaluation Time additionally includes thorough review of current medical information, gathering information on past medical history/social history and prior level of function, completion of standardized testing/informal observation of tasks, assessment of data and education on plan of care and goals.             Mikala Vivas, MOT, OTR/L  # [id number]

## 2023-02-13 NOTE — DISCHARGE SUMMARY
Physician Discharge Summary     Patient ID:  Tracy Kelley  [id number]  27 y.o.  1945    Admit date: 2/12/2023    Discharge date and time:2/16/2023    Admitting Physician: Francis Momin MD     Admission Diagnoses: Trauma [T14.90XA]  Subdural hematoma [S06. 5XAA]  Closed supracondylar fracture of right humerus, initial encounter [S42.411A]    Discharge Diagnoses: Principal Problem:    Trauma  Active Problems:    Subdural hematoma    Closed fracture of distal end of right humerus with routine healing  Resolved Problems:    * No resolved hospital problems. *    Admission Condition: fair    Discharged Condition: stable    Indication for Admission: [facility] Course/Procedures/Operation/treatments:   2/12: Injury occurred just prior to arrival. Patient had mechanical fall over her dog, hit her head, no LOC. Denies n/v or visual changes. At [facility] scans showed B/L SDH and R humeral nondisplaced fracture. Pain in her arm is well controlled, splinted and in sling. Denies weakness, numbness, paraesthesias. NSGY and ortho consulted  2/13: Ortho planning for OR on 2/16 for R humerus fx. Nsgy rec keppra 500 bid. PT/OT evaluation pending, discharge planning. 2/14: No acute events, possible OR with ortho tomorrow. Did well with PTOT, 18/24. Awaiting humerus repair then possible dc to home. 2/15: No acute events. For OR today with Ortho for humerus. 2/16: No acute events. Requesting home tramadol for pain. S/P ORIF R humerus yesterday. Discharge home.       Consults:   IP CONSULT TO ORTHOPEDIC SURGERY  IP CONSULT TO TRAUMA SURGERY  IP CONSULT TO NEUROSURGERY    Significant Diagnostic Studies:   XR ELBOW RIGHT (MIN 3 VIEWS)    Result Date: 2/12/2023                                      [address]                                             [facility], [address]                                              [phone] XRay Report                                                  Signed    Moira Austin [address]                                                                MR#: [id number]  [id number]          : 1945                                                                [de-identified]            Age/Sex: 78 / F                                                                Admit Date: 23            Loc: ER                                                                                     Attending Dr:     Ajith Penaloza Physician: Rebekah Clark MD   Date of Service: 23  Procedure(s): XR elbow RT min 3V  Accession Number(s): [id number]    cc: Daniel Demarco MD      INDICATION:  Right elbow pain post fall. TECHNIQUE:  Four views of the right elbow. COMPARISON:  None available. FINDINGS:        Supracondylar fracture with mild displacement. No findings of an intracondylar   extension. No dislocation identified. RIGHT elbow joint effusion. IMPRESSION:      Supracondylar RIGHT humeral fracture. No dislocation identified. RIGHT elbow joint effusion.          Signed by Na Mendez MD           Dictated By: Davida Mar MD DD/DT: 23 0820               Signed By: Davida Mar MD 23 0921          :    XR KNEE RIGHT (3 VIEWS)    Result Date: 2023                                      [address]                                              [phone]                                                XRay Report                                                  Signed    Moira Austin [address]                                                                MR#: [id number]  Mary Lai          : 1945 [de-identified]            Age/Sex: 78 / F                                                                Admit Date: 02/12/23            Loc: ER                                                                                     Attending Dr:     Ordering Physician: Tushar Ye MD   Date of Service: 02/12/23  Procedure(s): XR knee RT 3V  Accession Number(s): [id number]    cc: Daniel Demarco MD      INDICATION:  Right knee pain post fall. TECHNIQUE:  Three views of the right knee. COMPARISON:  None available. FINDINGS:     There is no displaced fracture. The alignment is anatomic. No soft tissue   abnormality is seen. There is no joint effusion. IMPRESSION:      1. No acute fracture of the right knee. .         Signed by Key Worthington MD           Dictated By: Viktoriya Youssef MDDD/DT: 02/12/23 0855               Signed By: Viktoriya Youssef MD 02/12/23 0952         :    CT HEAD WO CONTRAST    Result Date: 2/12/2023  EXAMINATION: CT OF THE HEAD WITHOUT CONTRAST  2/12/2023 10:49 am TECHNIQUE: CT of the head was performed without the administration of intravenous contrast. Automated exposure control, iterative reconstruction, and/or weight based adjustment of the mA/kV was utilized to reduce the radiation dose to as low as reasonably achievable. COMPARISON: CT face same date at 842 hours HISTORY: ORDERING SYSTEM PROVIDED HISTORY: SDH TECHNOLOGIST PROVIDED HISTORY: Reason for exam:->SDH Has a \"code stroke\" or \"stroke alert\" been called? ->No What reading provider will be dictating this exam?->CRC FINDINGS: Parenchyma: Stable right frontotemporal subdural hematoma, measuring up to 4 mm on coronal images (series 4, image 41). No mass effect. Mild chronic microvascular ischemic changes. No new intracranial hemorrhage. Ventricles: No evidence of hydrocephalus. Calvarium: No acute calvarial fracture is seen. Right frontal/periorbital scalp hematoma.   Subcentimeter osteoma of the outer table at the right occipitomastoid suture. Stable right subdural hematoma. No mass effect. No new intracranial hemorrhage. CT HEAD WO CONTRAST    Result Date: 2023                                      [address] [place], [place] [address]                                              [phone]                                               CT Scan Report                                                  Signed    PatientVergil [facility]                                                                MR#: [id number]  [id number]          : 1945                                                                [de-identified]            Age/Sex: 78 / F                                                                Admit Date: 23            Loc: ER                                                                                     Attending Dr:     Adrian Silva Physician: Buck Cowden MD   Date of Service: 23  Procedure(s): CT head/brain wo con  Accession Number(s): [id number]    cc: Buck Cowden MD      INDICATION:  Rhys down stairs, abrasions and bruising to right face. TECHNIQUE:  CT of the brain and facial bones was performed without contrast.        Automated mA/kV exposure control was utilized and patient examination was performed   in strict accordance with principles of ALARA. RADIATION AMOUNT:  834 mGy-cm. COMPARISON:  None available. FINDINGS:       There is a RIGHT frontal superficial soft tissue hematoma. No underlying fracture   is identified. There is a RIGHT subdural hematoma with a maximum depth of   approximately 4 mm. There is a RIGHT parafalcine subdural hematoma and LEFT   tentorial subdural hematoma each with a maximum depth of approximately 2 mm. No   associated mass effect with these findings.   Chronic small vessel changes and   generalized volume loss. Paranasal sinuses, middle ears and mastoid air cells are   clear. No acute orbital abnormality. No facial fracture identified. No acute orbital abnormality is identified. The   cribriform plate and lamina papyracea are intact. The paranasal sinuses are clear. No mandibular fracture or dislocation. No acute abnormality the temporomandibular   joints. The hard palate and pterygoid plates are intact. The bony skull base is of   normal appearance. Visualized portions of the middle ears and mastoid air cells are   clear. The partially visualized soft tissues of the skull base of normal   appearance. IMPRESSION:      There is a RIGHT hemispheric subdural hematoma with a maximum depth of   approximately 4 mm. There is a RIGHT parafalcine subdural hematoma and LEFT   tentorial subdural hematoma each with a maximum depth of approximately 2 mm. No   associated mass effect with these findings. Chronic small vessel changes and generalized volume loss. There is a RIGHT frontal superficial soft tissue hematoma. No underlying fracture   is identified. No findings of facial fracture.          Signed by Milton Simeon MD           Dictated By: Ferol Rubinstein MD DD/DT: 23 0906               Signed By: Ferol Rubinstein MD 23 0951          :    CT FACIAL BONES WO CONTRAST    Result Date: 2023                                      [address]                                              [address]                                              [phone]                                               CT Scan Report                                                  Signed    Carli [facility]                                                                MR#: [id number]  [id number]          : 1945 [de-identified]            Age/Sex: 78 / F                                                                Admit Date: 02/12/23            Loc: ER                                                                                     Attending Dr:     Ordering Physician: Isidro Dinh MD   Date of Service: 02/12/23  Procedure(s): CT facial bones wo con  Accession Number(s): [id number]    cc: Daniel Demarco MD      INDICATION:  Kody Shoulder down stairs, abrasions and bruising to right face. TECHNIQUE:  CT of the brain and facial bones was performed without contrast.        Automated mA/kV exposure control was utilized and patient examination was performed   in strict accordance with principles of ALARA. RADIATION AMOUNT:  834 mGy-cm. COMPARISON:  None available. FINDINGS:       There is a RIGHT frontal superficial soft tissue hematoma. No underlying fracture   is identified. There is a RIGHT subdural hematoma with a maximum depth of   approximately 4 mm. There is a RIGHT parafalcine subdural hematoma and LEFT   tentorial subdural hematoma each with a maximum depth of approximately 2 mm. No   associated mass effect with these findings. Chronic small vessel changes and   generalized volume loss. Paranasal sinuses, middle ears and mastoid air cells are   clear. No acute orbital abnormality. No facial fracture identified. No acute orbital abnormality is identified. The   cribriform plate and lamina papyracea are intact. The paranasal sinuses are clear. No mandibular fracture or dislocation. No acute abnormality the temporomandibular   joints. The hard palate and pterygoid plates are intact. The bony skull base is of   normal appearance. Visualized portions of the middle ears and mastoid air cells are   clear. The partially visualized soft tissues of the skull base of normal   appearance.       IMPRESSION:      There is a RIGHT hemispheric subdural hematoma with a maximum depth of   approximately 4 mm. There is a RIGHT parafalcine subdural hematoma and LEFT   tentorial subdural hematoma each with a maximum depth of approximately 2 mm. No   associated mass effect with these findings. Chronic small vessel changes and generalized volume loss. There is a RIGHT frontal superficial soft tissue hematoma. No underlying fracture   is identified. No findings of facial fracture. Signed by Trung Hurtado MD           Dictated By: Brenda Tobar MD DD/DT: 23               Signed By: Brenda Tobar MD 23 0951          :    CT CERVICAL SPINE WO CONTRAST    Result Date: 2023                                      [address], [address] [place] [address]                                              [phone]                                               CT Scan Report                                                  Signed    [facility]                                                                MR#: Peggy Hamm          : 1945                                                                [de-identified]            Age/Sex: 78 / F                                                                Admit Date: 23            Loc: ER                                                                                     Attending Dr:     Romana Elena Physician: Jenaro Montero MD   Date of Service: 23  Procedure(s): CT cervical spine wo con  Accession Number(s): [id number]    cc: Jenaro Montero MD      INDICATION:  Kana Cocker down stairs, abrasions and bruising to right face. TECHNIQUE:  CT of the cervical spine was performed without intravenous or   intrathecal contrast.  Sagittal and coronal reconstructions were generated.         Automated mA/kV exposure control was utilized and patient examination was performed   in strict accordance with principles of ALARA. RADIATION AMOUNT:  492 mGy-cm. COMPARISON:  None available. FINDINGS:       Normal alignment of the cervical vertebral bodies. The vertebral bodies are of   normal height. There is loss of intervertebral disc space height endplate   sclerosis and marginal osteophyte formation C5-C6, C6-C7 and C7-T1. Mild cervical   facet arthrosis. Degenerative changes of the C1-C2 articulation and craniocervical   junction otherwise of normal relationship. Prevertebral soft tissues of normal   appearance. Partially visualized nodular enlarged thyroid. Examination through the cranial cervical junction showing it to widely patent. Examination through the C2-C3 intervertebral level revealing facet arthrosis. Mild   uncovertebral degenerative changes. No significant central stenosis. No   significant foraminal narrowing. Examination through the C3-C4 intervertebral level revealing mild uncovertebral   degenerative changes. Small posterior disc osteophyte. No significant central   stenosis or foraminal narrowing. Examination through the C4-C5 intervertebral level revealing facet arthrosis. Uncovertebral degenerative changes. Posterior disc osteophyte. Effacement of the   CSF. Associated cord flattening. Central AP canal diameter measuring   approximately 6 mm. Moderate to severe RIGHT foraminal narrowing. Moderate LEFT   foraminal narrowing. Examination through the C5-C6 intervertebral level revealing facet arthrosis with   uncovertebral degenerative changes and posterior disc osteophyte. There is a mild   associated central stenosis. Effacement of the CSF. Question mild impress on the   ventral cord. Central AP canal diameter measuring approximately 7 mm. Moderate   bilateral foraminal narrowing.       Examination through the C6-C7 intervertebral level revealing facet arthrosis with uncovertebral degenerative changes with posterior disc osteophyte. Mild associated   central stenosis. No significant RIGHT foraminal narrowing. Mild LEFT foraminal   narrowing. Examination through the C7-T1 intervertebral level revealing small posterior disc   osteophyte. No significant central stenosis. No significant foraminal narrowing. Impression:      Spondylotic changes and facet arthrosis. No findings of fracture or listhesis. Posterior disc osteophyte at C4-C5 resulting in effacement of the CSF and   associated cord flattening. This is of a chronic appearance. Central AP canal   diameter measuring approximately 6 mm. Moderate to severe RIGHT foraminal   narrowing. Moderate LEFT foraminal narrowing. C5-C6 Effacement of the CSF. Question mild impress on the ventral cord. Central AP   canal diameter measuring approximately 7 mm. Moderate bilateral foraminal   narrowing. C6-C7 Mild central stenosis. No significant RIGHT foraminal narrowing. Mild LEFT   foraminal narrowing.          Signed by Brina Schrader MD           Dictated By: Carley Bernal MD DD/DT: 23 1033               Signed By: Carley Bernal MD 23 1049          :    XR CHEST PORTABLE    Result Date: 2023                                      [address]                                              [phone]                                                XRay Report                                                  Signed    [facility]                                                                [facility] Sheila Wahl          : 1945                                                                [de-identified]            Age/Sex: 78 / F                                                                Admit Date: 23 Loc: ER                                                                                     Attending Dr:     Ordering Physician: Rebekah Clark MD   Date of Service: 02/12/23  Procedure(s): XR chest 1V portable  Accession Number(s): [id number]    cc: Daniel Demarco MD      INDICATION:  Pain post fall. TECHNIQUE:  Frontal chest was obtained at 1011 hours. COMPARISON:  None available      FINDINGS:     The cardiomediastinal silhouette is normal in size. There is no consolidation or   atelectasis in either lung. There are no pleural effusions. There is no   pneumothorax. No acute osseous process. IMPRESSION:   No acute cardiopulmonary disease. .           Signed by Abhishek Flores MD           Dictated By: Jaime Mora MD        DD/DT: 02/12/23 1015               Signed By: Jaime Mora MD 02/12/23 1054                 :    CT 3D RECONSTRUCTION    Result Date: 2/12/2023  EXAMINATION: CT OF THE RIGHT ELBOW WITHOUT CONTRAST; 3D RECONSTRUCTIONS 2/12/2023 5:42 pm TECHNIQUE: CT of the right elbow was performed without the administration of intravenous contrast.  Multiplanar reformatted images are provided for review. Automated exposure control, iterative reconstruction, and/or weight based adjustment of the mA/kV was utilized to reduce the radiation dose to as low as reasonably achievable. ; 3D reconstructions were performed on a separate workstation. Automated exposure control, iterative reconstruction, and/or weight based adjustment of the mA/kV was utilized to reduce the radiation dose to as low as reasonably achievable. COMPARISON: Elbow series from the same day at 07:57 HISTORY ORDERING SYSTEM PROVIDED HISTORY: supracondylar humerus fracture TECHNOLOGIST PROVIDED HISTORY: Reason for exam:->supracondylar humerus fracture What reading provider will be dictating this exam?->CRC FINDINGS: Bones: Transversely oriented supracondylar fracture of the distal right humerus.   Slight anterior displacement of the medial aspect of the fracture and slight posterior displacement of the lateral aspect. The distal humeral articulation with the ulna and the radiocapitellar articulations appear maintained. Radial head and radial head/neck region appears intact. The imaged ulna appears intact. Soft Tissue: Soft tissue stranding and edema about the right elbow joint. There also appears to be muscular edema. Joint: Mild degenerative spurring about the right elbow joint. Overall the right elbow appears properly aligned. There is a joint effusion. 3D volumetric reformats were also acquired for subspecialty services. 1.  There is a supracondylar fracture of the distal right humerus. The fracture has a predominantly transverse orientation with slight displacement. 2.  The distal humeral and ulnar articulation and the radiocapitellar articulations appear maintained on these images. 3.  There is soft tissue stranding and edema about the right elbow joint. There is a joint effusion. CT ELBOW RIGHT WO CONTRAST    Result Date: 2/12/2023  EXAMINATION: CT OF THE RIGHT ELBOW WITHOUT CONTRAST; 3D RECONSTRUCTIONS 2/12/2023 5:42 pm TECHNIQUE: CT of the right elbow was performed without the administration of intravenous contrast.  Multiplanar reformatted images are provided for review. Automated exposure control, iterative reconstruction, and/or weight based adjustment of the mA/kV was utilized to reduce the radiation dose to as low as reasonably achievable. ; 3D reconstructions were performed on a separate workstation. Automated exposure control, iterative reconstruction, and/or weight based adjustment of the mA/kV was utilized to reduce the radiation dose to as low as reasonably achievable.  COMPARISON: Elbow series from the same day at 07:57 HISTORY ORDERING SYSTEM PROVIDED HISTORY: supracondylar humerus fracture TECHNOLOGIST PROVIDED HISTORY: Reason for exam:->supracondylar humerus fracture What reading provider will be dictating this exam?->CRC FINDINGS: Bones: Transversely oriented supracondylar fracture of the distal right humerus. Slight anterior displacement of the medial aspect of the fracture and slight posterior displacement of the lateral aspect. The distal humeral articulation with the ulna and the radiocapitellar articulations appear maintained. Radial head and radial head/neck region appears intact. The imaged ulna appears intact. Soft Tissue: Soft tissue stranding and edema about the right elbow joint. There also appears to be muscular edema. Joint: Mild degenerative spurring about the right elbow joint. Overall the right elbow appears properly aligned. There is a joint effusion. 3D volumetric reformats were also acquired for subspecialty services. 1.  There is a supracondylar fracture of the distal right humerus. The fracture has a predominantly transverse orientation with slight displacement. 2.  The distal humeral and ulnar articulation and the radiocapitellar articulations appear maintained on these images. 3.  There is soft tissue stranding and edema about the right elbow joint. There is a joint effusion.      XR HUMERUS LEFT (1 VIEW)    Result Date: 2023                                      [address], [address] [place] [address] [address]                                              [phone]                                                XRay Report                                                  Signed    [facility]Madie [facility]                                                                MR#: [id number]  [id number]          : 1945                                                                [de-identified]            Age/Sex: 78 / F                                                                Admit Date: 23            Loc: ER Attending Dr:     Ordering Physician: Mary Mann MD   Date of Service: 02/12/23  Procedure(s): XR humerus RT  Accession Number(s): [id number]    cc: Daniel Demarco MD      INDICATION:  Pain in right upper arm, and elbow, post fall. TECHNIQUE:  Two views of the right humerus. COMPARISON:  None Available. FINDINGS:        2 views of the RIGHT humerus showing supracondylar fracture with mild displacement. No findings of an intracondylar extension. No dislocation identified. Degenerative changes of the RIGHT shoulder. Chronic loss of acromiohumeral joint   space. IMPRESSION:      Supracondylar RIGHT humeral fracture. No dislocation identified. Signed by Taj Patel MD           Dictated By: Carli Juarez MD DD/DT: 02/12/23 0816               Signed By: Carli Juarez MD 02/12/23 0924          :      Discharge Exam:   TRAUMA SURGERY  DAILY PROGRESS NOTE  2/16/2023     CHIEF COMPLAINT:       Chief Complaint   Patient presents with    Consultation       Transfer from [place], fall down steps, right elbow fx, SDH       SUBJECTIVE:  No acute events overnight. \"Too loopy with percocet\" requesting home tramadol  Tolerating diet. OBJECTIVE:  /76   Pulse 76   Temp 97 °F (36.1 °C) (Temporal)   Resp 15   Wt 134 lb (60.8 kg)   SpO2 97%   BMI 24.51 kg/m²      GENERAL:  NAD. A&Ox3. LUNGS:  No increased work of breathing. CARDIOVASCULAR: RR  ABDOMEN:  Soft, non-distended, non-tender. No guarding, rigidity, rebound.   EXTREMITIES: RUE in sling, wiggles fingers  NEURO: GCS 15     ASSESSMENT/PLAN:  78 y.o. female s/p mechanical fall with R subdural hematoma, stable on repeat CT, and R humerus fracture s/p ORIF 2/15     Neuro exam without deficts, stable SDH  Neurosurgery following -- Keppra 500mg bid for 7 days  Ortho following for humerus fx s/p ORIF 2/15  Hx of diabetes -- monitor BG, carb control diet after OR  Multimodal pain control, Ambulate, SMI  PTOT -- AMPAC 18/24  DVT PPx post-op  Possible dc today when ok with ortho    Disposition: home    In process/preliminary results:  Outstanding Order Results       No orders found from 1/14/2023 to 2/13/2023. Patient Instructions:   Current Discharge Medication List             Details   levETIRAcetam (KEPPRA) 500 MG tablet Take 1 tablet by mouth 2 times daily for 5 doses  Qty: 5 tablet, Refills: 0      oxyCODONE-acetaminophen (PERCOCET) 5-325 MG per tablet Take 1 tablet by mouth every 6 hours as needed for Pain for up to 7 days. Intended supply: 7 days. Take lowest dose possible to manage pain Max Daily Amount: 4 tablets  Qty: 28 tablet, Refills: 0    Comments: Reduce doses taken as pain becomes manageable  Associated Diagnoses: Closed supracondylar fracture of right humerus, initial encounter                Details   amLODIPine (NORVASC) 5 MG tablet Take 1 tablet by mouth daily  Qty: 90 tablet, Refills: 1    Associated Diagnoses: Primary hypertension      escitalopram (LEXAPRO) 10 MG tablet Take 1 tablet by mouth daily  Qty: 30 tablet, Refills: 5    Associated Diagnoses: Burnout of caregiver      glipiZIDE (GLUCOTROL) 5 MG tablet Take 2 tablets by mouth 2 times daily      pioglitazone (ACTOS) 45 MG tablet Take 45 mg by mouth daily.              Discharge [facility] Department of Orthopedic Surgery  [address]    Dr. Nidhi Garcia, DO         MD Dr. Sue Quintana MD Dwaine Bevel, PA-C Arvell Dieter PA-C    [facility] Jerson DENIS      Orthopaedics Discharge Instructions   Weight bearing Status - Non-weight bearing - on right upper Extremity  Pain medication Per Prescriptions  Contact Office for Medication Refill- [phone]  Office can refill pain med every 7 days  If patient discharging to facility then pain control will be continued per facility physician  Ice to operative/injured site for 15-30 minutes of each hour for next 5 days    Recommend that you continue to ice the area 2-3 times per day after this   Elevate operative/injured limb on 2 pillows at home  Goal is to have limb above the heart if able   Wound care - leave splint in place until follow up   Follow Up in Office in 2 weeks. Your first post op appointment is often with one of our PAs. Call the office at [phone] or directions or with any questions. Watch for these significant complications. Call physician if they or any other problems occur:  Fever over 101°, redness, swelling or warmth at the operative site  Unrelieved nausea    Foul smelling or cloudy drainage at the operative site   Unrelieved pain    Blood soaked dressing. (Some oozing may be normal)     Numb, pale, blue, cold or tingling extremity    Future Appointments   Date Time Provider Mariam Pleitez   2/27/2023  1:00 PM Wilber Lua SE ORTHO APC [facility]   3/16/2023  2:30 PM CIRA Lino [facility]   3/17/2023  8:00 AM Austen Mcintosh., DPM [facility] [facility]         It is the Department of Orthopaedic Trauma's standard of practice that providers will de-escalate(wean) all patients from narcotic(opioid) medications during the post-operative period. We provide multimodal pain control but opioid medications are tapered in all of our patients. If patient requires referral to pain management for prolonged taper off of opioid pain medication we will facilitate this process. TRAUMA SERVICES DISCHARGE INSTRUCTIONS    Call [phone], option 2, for any questions/concerns and for follow-up appointment in 1 week(s). Please follow the instructions checked below:  Please follow-up with your primary care provider.     ACTIVITY INSTRUCTIONS  Increase activity as tolerated  No heavy lifting or strenuous activity  Take your incentive spirometer home and use 4-6 times/day   [x]  No driving until cleared by Orthopedic surgery. WOUND/DRESSING INSTRUCTIONS:  You may shower. No sitting in bath tub, hot tub or swimming until cleared by physician. Ice to areas of pain for first 24 hours. Heat to areas of pain after that. Wash areas of lacerations/abrasions with soap & water. Rinse well. Pat dry with clean towel. Keep wounds clean and dry. MEDICATION INSTRUCTIONS  Take medication as prescribed. When taking pain medications, you may experience dizziness or drowsiness. Do not drink alcohol or drive when taking these medications. You may experience constipation while taking pain medication. You may take over the counter stool softeners such as docusate (Colace), sennosides S (Senokot-S), or Miralax. [x]  You may take acetaminophen (Tylenol) products. Do NOT take more than 4000mg of Tylenol in 24h. OPIOID MEDICATION INSTRUCTIONS  Read the medication guide that is included with your prescription. Take your medication exactly as prescribed. Store medication away from children and in a safe place. Do NOT share your medication with others. Do NOT take medication unless it is prescribed for you. Do NOT drink alcohol while taking opioids (I.e., Norco, Percocet, Oxycodone, etc). Discuss with the Trauma Clinic staff if the dose of medication you are taking does not control your pain and any side effects that you may be having. CALL 911 OR YOUR LOCAL EMERGENCY SERVICE:  --If you take too much medication  --If you have trouble breathing or shortness of breath  --A child has taken this medication. WORK:  You may not return to work until you receive follow-up with the Trauma Clinic or clearance by all consultants. Call the trauma clinic for any of the following or for questions/concerns;  --fever over 101F  --redness, swelling, hardness or warmth at the wound site(s).   --Unrelieved nausea/vomiting  --Foul smelling or cloudy drainage at the wound site(s)  --Unrelieved pain or increase in pain  --Increase in shortness of breath    Follow-up:  Trauma Clinic: [phone] option [address]  [place], [phone] [address]    SPECIAL CONSIDERATIONS FOR OUR PATIENTS OVER THE AGE OF 65Y    Getting around your home safely can be a challenge if you have injuries or health problems that make it easy for you to fall. Loose rugs and furniture in walkways are among the dangers for many older people who have problems walking or who have poor eyesight. People who have conditions such as arthritis, osteoporosis, or dementia also must be careful not to fall. You can make your home safer with a few simple measures. Follow-up care is a key part of your treatment and safety. Be sure to make and go to all appointments, and call your doctor or nurse call line if you are having problems. It's also a good idea to know your test results and keep a list of the medicines you take. How can you care for yourself at home? Taking care of yourself  You may get dizzy if you do not drink enough water. To prevent dehydration, drink plenty of fluids, enough so that your urine is light yellow or clear like water. Choose water and other caffeine-free clear liquids. If you have kidney, heart, or liver disease and have to limit fluids, talk with your doctor before you increase the amount of fluids you drink. Exercise regularly to improve your strength, muscle tone, and balance. Walk if you can. Swimming may be a good choice if you cannot walk easily. Have your vision and hearing checked each year or any time you notice a change. If you have trouble seeing and hearing, you might not be able to avoid objects and could lose your balance. Know the side effects of the medicines you take. Ask your doctor or pharmacist whether the medicines you take can affect your balance. Sleeping pills or sedatives can affect your balance. Limit the amount of alcohol you drink.  Alcohol can impair your balance and other senses. Ask your doctor whether calluses or corns on your feet need to be removed. If you wear loose-fitting shoes because of calluses or corns, you can lose your balance and fall. Talk to your doctor if you have numbness in your feet. Preventing falls at home  Remove raised doorway thresholds, throw rugs, and clutter. Repair loose carpet or raised areas in the floor. Move furniture and electrical cords to keep them out of walking paths. Use non-skid floor wax, and wipe up spills right away, especially on ceramic tile floors. If you use a walker or cane, put rubber tips on it. If you use crutches, clean the bottoms of them regularly with an abrasive pad, such as steel wool. Keep your house well lit, especially stairways, porches, and outside walkways. Use night-lights in areas such as hallways and washrooms. Add extra light switches or use remote switches (such as switches that go on or off when you clap your hands) to make it easier to turn lights on if you have to get up during the night. Install sturdy handrails on stairways. Move items in your cabinets so that the things you use a lot are on the lower shelves (about waist level). Keep a cordless phone and a flashlight with new batteries by your bed. If possible, put a phone in each of the main rooms of your house, or carry a cell phone in case you fall and cannot reach a phone. Or, you can wear a device around your neck or wrist. You push a button that sends a signal for help. Wear low-heeled shoes that fit well and give your feet good support. Use footwear with non-skid soles. Check the heels and soles of your shoes for wear. Repair or replace worn heels or soles. Do not wear socks without shoes on wood floors. Walk on the grass when the sidewalks are slippery. If you live in an area that gets snow and ice in the winter, sprinkle salt on slippery steps and sidewalks.     Preventing falls in the bath  Install grab bars and non-skid mats inside and outside your shower or tub and near the toilet and sinks. Use shower chairs and bath benches. Use a hand-held shower head that will allow you to sit while showering. Get into a tub or shower by putting the weaker leg in first. Get out of a tub or shower with your strong side first.  Repair loose toilet seats and consider installing a raised toilet seat to make getting on and off the toilet easier. Keep your washroom door unlocked while you are in the shower. Follow-up:  Trauma Clinic: [phone] option [address]  [address]  [place], [address] [place]       Follow up:   Trina Dsouza, [address] [place] [address] [address] [place] [address] Phyllis [phone]    Schedule an appointment as soon as possible for a visit in 2 week(s)      CM [address]  [place] [address]  [phone]        [address] [place] [address] [phone]  Follow up in 1 week(s)  For follow up    Boogie Huang MD  [address] [place] [address].   [place] [id number] [phone]    Follow up in 3 week(s)  For follow up     Signed:  BRITTANY Alonso - MEETA  2/16/2023  12:41 PM

## 2023-02-13 NOTE — CARE COORDINATION
2/13/23 Transition of Care Update. Patient admitted after a fall from tripping over her dog. Patient has a closed fx to the right humerus and subdural hematoma. Spoke with patient who is alert and oriented. Patient is NWB to her RUE with splint. Patient is planned for CT to eval fx. Patient states she lives lone in a 2 story house with steps to the second floor and a rail on the right. Patient was independent and driving prior to admit. Patient does not own any DME. Patient is not active with any home care and has no TINA history. PCP is Dr. Ángela Perez and pharmacy preference is [facility] in [place]. Patient is pending therapy evaluations with home care list provided for patient to review for potential support post discharge. Discharge goal is home once medially stable. /CM to follow. Electronically signed by COLE Aguilar on 2/13/2023 at 12:41 PM     Case Management Assessment  Initial Evaluation    Date/Time of Evaluation: 2/13/2023 12:41 PM  Assessment Completed by: COLE Aguilar    If patient is discharged prior to next notation, then this note serves as note for discharge by case management. Patient Name: Costa Gates                   YOB: 1945  Diagnosis: Trauma [T14.90XA]  Subdural hematoma [S06. 5XAA]  Closed supracondylar fracture of right humerus, initial encounter Abdi Leaver                   Date / Time: 2/12/2023 11:26 AM    Patient Admission Status: Inpatient   Readmission Risk (Low < 19, Mod (19-27), High > 27): Readmission Risk Score: 12.6    Current PCP: Rolando Hancock MD  PCP verified by ? Yes    Chart Reviewed: Yes      History Provided by: Patient  Patient Orientation: Alert and Oriented, Person, Place, Situation    Patient Cognition: Alert    Hospitalization in the last 30 days (Readmission):  No    If yes, Readmission Assessment in  Navigator will be completed.     Advance Directives:      Code Status: Limited   Patient's Primary Decision Maker is:      Primary Decision Maker (Active): Rashida Farrar - Niece/Nephew - [phone]    Secondary Decision Maker: Steve Goddard - Niece/Nephew - [phone]    Supplemental (Other) Decision Maker: Rosa James - [phone]    Discharge Planning:    Patient lives with:   Type of Home:    Primary Care Giver: Self  Patient Support Systems include: Family Members, Friends/Neighbors   Current Financial resources:    Current community resources:    Current services prior to admission:              Current DME:              Type of Home Care services:       ADLS  Prior functional level: Independent in ADLs/IADLs  Current functional level: Assistance with the following:, Bathing, Dressing, Housework    PT AM-PAC:   /24  OT AM-PAC:   /24    Family can provide assistance at DC: Yes  Would you like Case Management to discuss the discharge plan with any other family members/significant others, and if so, who?  No  Plans to Return to Present Housing: Yes  Other Identified Issues/Barriers to RETURNING to current housing: Patient can return but may require home care  Potential Assistance needed at discharge:              Potential DME:    Patient expects to discharge to:    Plan for transportation at discharge:      Financial    Payor: Devin Tam / Plan: MEDICARE PART A AND B / Product Type: *No Product type* /     Does insurance require precert for SNF: No    Potential assistance Purchasing Medications:    Meds-to-Beds request: Yes      [address], [place] [address] [facility]  [address] [address]  Phone: [phone] Fax: [address] Danette Pandey [facility] [address] [place] [address]  Phone: [phone] Fax: [phone]      Notes:    Factors facilitating achievement of predicted outcomes: Friend support, Cooperative, and Pleasant    Barriers to discharge: none    Additional Case Management Notes: See above    The Plan for Transition of Care is related to the following treatment goals of Trauma [T14.90XA]  Subdural hematoma [S06. 5XAA]  Closed supracondylar fracture of right humerus, initial encounter [M99.237T]    IF APPLICABLE: The Patient and/or patient representative Victor Manuel Or and her family were provided with a choice of provider and agrees with the discharge plan. Freedom of choice list with basic dialogue that supports the patient's individualized plan of care/goals and shares the quality data associated with the providers was provided to:     Patient Representative Name:       The Patient and/or Patient Representative Agree with the Discharge Plan?       Prasanna Barron [place]  Case Management Department  Ph: [phone] Fax:

## 2023-02-13 NOTE — PROGRESS NOTES
Department of Orthopedic Surgery  Resident Progress Note    Patient seen and examined. Pain controlled. No new complaints. Denies chest pain, shortness of breath, dizziness/lightheadedness. Doing well this morning. VITALS:  /67   Pulse 78   Temp 98.2 °F (36.8 °C) (Oral)   Resp 18   Wt 134 lb (60.8 kg)   SpO2 98%   BMI 24.51 kg/m²     General: alert and oriented to person, place and time, well-developed and well-nourished, in no acute distress    MUSCULOSKELETAL:   right upper extremity:  Splint C/D/I  Compartments soft and compressible  +AIN/PIN/Ulnar/Median/Radial nerve function intact grossly  +2/4 Radial pulse, Cap refill <2 sec  Sensation intact to touch in radial/ulnar/median nerve distributions to hand    CBC:   Lab Results   Component Value Date/Time    WBC 7.0 02/13/2023 04:52 AM    HGB 10.1 02/13/2023 04:52 AM    HCT 29.4 02/13/2023 04:52 AM     02/13/2023 04:52 AM     PT/INR:    Lab Results   Component Value Date/Time    PROTIME 10.9 02/12/2023 10:25 AM    INR 1.00 02/12/2023 10:25 AM         ASSESSMENT  Right distal humerus fracture    PLAN      Continue physical therapy and protocol: NWB - RUE  Deep venous thrombosis prophylaxis - per primary team, early mobilization  PT/OT  Pain Control: IV and PO  We will plan for operative fixation of the patients fracture on 2/16. Discussed risks and benefits of surgical and non surgical options with the patient and she would like to proceed with surgery. We will keep her NPO the night before surgery.    Discussed with attending

## 2023-02-14 ENCOUNTER — ANESTHESIA EVENT (OUTPATIENT)
Dept: OPERATING ROOM | Age: 78
End: 2023-02-14
Payer: MEDICARE

## 2023-02-14 LAB
ABO/RH: NORMAL
ANION GAP SERPL CALCULATED.3IONS-SCNC: 8 MMOL/L (ref 7–16)
ANTIBODY SCREEN: NORMAL
BASOPHILS ABSOLUTE: 0.02 E9/L (ref 0–0.2)
BASOPHILS RELATIVE PERCENT: 0.4 % (ref 0–2)
BUN BLDV-MCNC: 28 MG/DL (ref 6–23)
CALCIUM SERPL-MCNC: 8.7 MG/DL (ref 8.6–10.2)
CHLORIDE BLD-SCNC: 112 MMOL/L (ref 98–107)
CO2: 21 MMOL/L (ref 22–29)
CREAT SERPL-MCNC: 1.1 MG/DL (ref 0.5–1)
EOSINOPHILS ABSOLUTE: 0.17 E9/L (ref 0.05–0.5)
EOSINOPHILS RELATIVE PERCENT: 3.3 % (ref 0–6)
GFR SERPL CREATININE-BSD FRML MDRD: 52 ML/MIN/1.73
GLUCOSE BLD-MCNC: 170 MG/DL (ref 74–99)
HCT VFR BLD CALC: 30.3 % (ref 34–48)
HEMOGLOBIN: 9.9 G/DL (ref 11.5–15.5)
IMMATURE GRANULOCYTES #: 0.02 E9/L
IMMATURE GRANULOCYTES %: 0.4 % (ref 0–5)
LYMPHOCYTES ABSOLUTE: 1.41 E9/L (ref 1.5–4)
LYMPHOCYTES RELATIVE PERCENT: 27.5 % (ref 20–42)
MCH RBC QN AUTO: 31.1 PG (ref 26–35)
MCHC RBC AUTO-ENTMCNC: 32.7 % (ref 32–34.5)
MCV RBC AUTO: 95.3 FL (ref 80–99.9)
METER GLUCOSE: 166 MG/DL (ref 74–99)
METER GLUCOSE: 182 MG/DL (ref 74–99)
METER GLUCOSE: 226 MG/DL (ref 74–99)
MONOCYTES ABSOLUTE: 0.39 E9/L (ref 0.1–0.95)
MONOCYTES RELATIVE PERCENT: 7.6 % (ref 2–12)
NEUTROPHILS ABSOLUTE: 3.12 E9/L (ref 1.8–7.3)
NEUTROPHILS RELATIVE PERCENT: 60.8 % (ref 43–80)
PDW BLD-RTO: 13.1 FL (ref 11.5–15)
PLATELET # BLD: 172 E9/L (ref 130–450)
PMV BLD AUTO: 10 FL (ref 7–12)
POTASSIUM REFLEX MAGNESIUM: 4 MMOL/L (ref 3.5–5)
RBC # BLD: 3.18 E12/L (ref 3.5–5.5)
SODIUM BLD-SCNC: 141 MMOL/L (ref 132–146)
WBC # BLD: 5.1 E9/L (ref 4.5–11.5)

## 2023-02-14 PROCEDURE — 82962 GLUCOSE BLOOD TEST: CPT

## 2023-02-14 PROCEDURE — 85025 COMPLETE CBC W/AUTO DIFF WBC: CPT

## 2023-02-14 PROCEDURE — 1200000000 HC SEMI PRIVATE

## 2023-02-14 PROCEDURE — 6370000000 HC RX 637 (ALT 250 FOR IP)

## 2023-02-14 PROCEDURE — 80048 BASIC METABOLIC PNL TOTAL CA: CPT

## 2023-02-14 PROCEDURE — 86901 BLOOD TYPING SEROLOGIC RH(D): CPT

## 2023-02-14 PROCEDURE — 36415 COLL VENOUS BLD VENIPUNCTURE: CPT

## 2023-02-14 PROCEDURE — 86850 RBC ANTIBODY SCREEN: CPT

## 2023-02-14 PROCEDURE — 86900 BLOOD TYPING SEROLOGIC ABO: CPT

## 2023-02-14 RX ORDER — INSULIN LISPRO 100 [IU]/ML
0-4 INJECTION, SOLUTION INTRAVENOUS; SUBCUTANEOUS NIGHTLY
Status: DISCONTINUED | OUTPATIENT
Start: 2023-02-14 | End: 2023-02-16 | Stop reason: HOSPADM

## 2023-02-14 RX ORDER — INSULIN LISPRO 100 [IU]/ML
0-16 INJECTION, SOLUTION INTRAVENOUS; SUBCUTANEOUS
Status: DISCONTINUED | OUTPATIENT
Start: 2023-02-14 | End: 2023-02-16 | Stop reason: HOSPADM

## 2023-02-14 RX ADMIN — ACETAMINOPHEN 650 MG: 325 TABLET ORAL at 09:32

## 2023-02-14 RX ADMIN — BACITRACIN ZINC: 500 OINTMENT TOPICAL at 17:30

## 2023-02-14 RX ADMIN — ACETAMINOPHEN 650 MG: 325 TABLET ORAL at 00:16

## 2023-02-14 RX ADMIN — BACITRACIN ZINC: 500 OINTMENT TOPICAL at 21:08

## 2023-02-14 RX ADMIN — ACETAMINOPHEN 650 MG: 325 TABLET ORAL at 12:26

## 2023-02-14 RX ADMIN — OXYCODONE HYDROCHLORIDE 2.5 MG: 5 TABLET ORAL at 21:15

## 2023-02-14 RX ADMIN — POLYETHYLENE GLYCOL 3350 17 G: 17 POWDER, FOR SOLUTION ORAL at 10:28

## 2023-02-14 RX ADMIN — BACITRACIN ZINC: 500 OINTMENT TOPICAL at 10:29

## 2023-02-14 RX ADMIN — LEVETIRACETAM 500 MG: 500 TABLET, FILM COATED ORAL at 10:28

## 2023-02-14 RX ADMIN — ACETAMINOPHEN 650 MG: 325 TABLET ORAL at 18:46

## 2023-02-14 RX ADMIN — LEVETIRACETAM 500 MG: 500 TABLET, FILM COATED ORAL at 21:16

## 2023-02-14 RX ADMIN — AMLODIPINE BESYLATE 5 MG: 5 TABLET ORAL at 10:28

## 2023-02-14 RX ADMIN — ESCITALOPRAM OXALATE 10 MG: 10 TABLET, FILM COATED ORAL at 10:28

## 2023-02-14 ASSESSMENT — PAIN DESCRIPTION - LOCATION
LOCATION: ARM;SHOULDER
LOCATION: HEAD

## 2023-02-14 ASSESSMENT — PAIN SCALES - GENERAL
PAINLEVEL_OUTOF10: 7
PAINLEVEL_OUTOF10: 3
PAINLEVEL_OUTOF10: 5
PAINLEVEL_OUTOF10: 0

## 2023-02-14 ASSESSMENT — PAIN DESCRIPTION - DESCRIPTORS
DESCRIPTORS: ACHING;SORE
DESCRIPTORS: ACHING;DISCOMFORT;SORE

## 2023-02-14 ASSESSMENT — PAIN DESCRIPTION - ORIENTATION
ORIENTATION: RIGHT
ORIENTATION: RIGHT

## 2023-02-14 ASSESSMENT — PAIN DESCRIPTION - FREQUENCY: FREQUENCY: CONTINUOUS

## 2023-02-14 ASSESSMENT — PAIN DESCRIPTION - PAIN TYPE: TYPE: ACUTE PAIN

## 2023-02-14 ASSESSMENT — PAIN DESCRIPTION - ONSET: ONSET: ON-GOING

## 2023-02-14 ASSESSMENT — PAIN - FUNCTIONAL ASSESSMENT: PAIN_FUNCTIONAL_ASSESSMENT: PREVENTS OR INTERFERES SOME ACTIVE ACTIVITIES AND ADLS

## 2023-02-14 NOTE — CARE COORDINATION
2/14/23  Transition of Care Update. Patient admitted for subdural hematoma and fracture of right humerus. Patient was evaluated by therapy with AM-PAC score of 18/24 for PT and 15/24 for OT. Patient is planned for ORIF tentatively on 2/15. Patient will need updated therapy after OR to assess for any home care needs. Patient was provided a Home health list to review. Discharge goal is home when medically stable. FERNANDO/CHARLIE to follow.     Electronically signed by COLE Elena on 2/14/2023 at 2:57 PM

## 2023-02-14 NOTE — PROGRESS NOTES
TRAUMA SURGERY  DAILY PROGRESS NOTE  2/14/2023    CHIEF COMPLAINT:  Chief Complaint   Patient presents with    Consultation     Transfer from [place], fall down steps, right elbow fx, SDH       SUBJECTIVE:  Sore but pain controlled with Rx. States may get surgery with Ortho tomorrow and requests it  Ambulating to bathroom, tolerating diet. BG have been elevated      OBJECTIVE:  BP 90/79   Pulse 78   Temp 98.3 °F (36.8 °C) (Infrared)   Resp 16   Wt 134 lb (60.8 kg)   SpO2 99%   BMI 24.51 kg/m²     GENERAL:  NAD. A&Ox3. LUNGS:  No increased work of breathing. CARDIOVASCULAR: RR  ABDOMEN:  Soft, non-distended, non-tender. No guarding, rigidity, rebound.   EXTREMITIES: RUE in sling, wiggles fingers  NEURO: GCS 15    ASSESSMENT/PLAN:  78 y.o. female s/p mechanical fall with R subdural hematoma, stable on repeat CT, and R humerus fracture    Neuro exam without deficts, stable SDH  Neurosurgery following  Keppra 500mg bid for 7 days  Ortho following for humerus fx -- NWB, possible OR tomorrow vs 2/16  Hx of diabetes -- BG have been elevated, changed to carb control diet, continue sliding scale  Acute blood loss anemia -- Hgb drop 2pt yesterday, f/u AM lab  Continue to hold DVT PPx until Hgb stable  Multimodal pain control, Ambulate, [facility]  PTOT -- [facility] 18/24      Carter Irving DO  Surgery Resident PGY-5  2/14/2023  5:53 AM

## 2023-02-14 NOTE — PROGRESS NOTES
Department of Orthopedic Surgery  Resident Progress Note    Patient seen and examined. Pain controlled. No new complaints. Wants surgery tomorrow 2/15. States that tomorrow is the only day when she can have family available. Anxious. Denies chest pain, shortness of breath, dizziness/lightheadedness.      VITALS:  BP 90/79   Pulse 78   Temp 98.3 °F (36.8 °C) (Infrared)   Resp 16   Wt 134 lb (60.8 kg)   SpO2 99%   BMI 24.51 kg/m²     General: alert and oriented to person, place and time, well-developed and well-nourished, in no acute distress, anxious    MUSCULOSKELETAL:   right upper extremity:  Splint C/D/I  Compartments soft and compressible  +AIN/PIN/Ulnar/Median/Radial nerve function intact grossly  +2/4 Radial pulse, Cap refill <2 sec  Sensation intact to touch in radial/ulnar/median nerve distributions to hand    CBC:   Lab Results   Component Value Date/Time    WBC 7.0 02/13/2023 04:52 AM    HGB 10.1 02/13/2023 04:52 AM    HCT 29.4 02/13/2023 04:52 AM     02/13/2023 04:52 AM     PT/INR:    Lab Results   Component Value Date/Time    PROTIME 10.9 02/12/2023 10:25 AM    INR 1.00 02/12/2023 10:25 AM         ASSESSMENT  Right distal humerus fracture    PLAN      Continue physical therapy and protocol: NWB - RUE  Deep venous thrombosis prophylaxis - per primary team, early mobilization  PT/OT  Pain Control: IV and PO  We will plan for operative fixation of the patients fracture tentatively on 2/15  Discussed with attending regarding availability  Preop orders to be completed once definitive date is agreed upon  Discuss with attending

## 2023-02-15 ENCOUNTER — APPOINTMENT (OUTPATIENT)
Dept: GENERAL RADIOLOGY | Age: 78
End: 2023-02-15
Payer: MEDICARE

## 2023-02-15 ENCOUNTER — ANESTHESIA (OUTPATIENT)
Dept: OPERATING ROOM | Age: 78
End: 2023-02-15
Payer: MEDICARE

## 2023-02-15 DIAGNOSIS — S06.5XAA SUBDURAL HEMATOMA: Primary | ICD-10-CM

## 2023-02-15 LAB
ANION GAP SERPL CALCULATED.3IONS-SCNC: 9 MMOL/L (ref 7–16)
BASOPHILS ABSOLUTE: 0.03 E9/L (ref 0–0.2)
BASOPHILS RELATIVE PERCENT: 0.6 % (ref 0–2)
BUN BLDV-MCNC: 22 MG/DL (ref 6–23)
CALCIUM SERPL-MCNC: 8.6 MG/DL (ref 8.6–10.2)
CHLORIDE BLD-SCNC: 110 MMOL/L (ref 98–107)
CO2: 22 MMOL/L (ref 22–29)
CREAT SERPL-MCNC: 0.9 MG/DL (ref 0.5–1)
EOSINOPHILS ABSOLUTE: 0.17 E9/L (ref 0.05–0.5)
EOSINOPHILS RELATIVE PERCENT: 3.5 % (ref 0–6)
GFR SERPL CREATININE-BSD FRML MDRD: >60 ML/MIN/1.73
GLUCOSE BLD-MCNC: 145 MG/DL (ref 74–99)
HCT VFR BLD CALC: 29.6 % (ref 34–48)
HEMOGLOBIN: 9.7 G/DL (ref 11.5–15.5)
IMMATURE GRANULOCYTES #: 0.03 E9/L
IMMATURE GRANULOCYTES %: 0.6 % (ref 0–5)
LYMPHOCYTES ABSOLUTE: 1.45 E9/L (ref 1.5–4)
LYMPHOCYTES RELATIVE PERCENT: 29.5 % (ref 20–42)
MCH RBC QN AUTO: 31.7 PG (ref 26–35)
MCHC RBC AUTO-ENTMCNC: 32.8 % (ref 32–34.5)
MCV RBC AUTO: 96.7 FL (ref 80–99.9)
METER GLUCOSE: 154 MG/DL (ref 74–99)
METER GLUCOSE: 211 MG/DL (ref 74–99)
METER GLUCOSE: 237 MG/DL (ref 74–99)
METER GLUCOSE: 267 MG/DL (ref 74–99)
MONOCYTES ABSOLUTE: 0.39 E9/L (ref 0.1–0.95)
MONOCYTES RELATIVE PERCENT: 7.9 % (ref 2–12)
NEUTROPHILS ABSOLUTE: 2.85 E9/L (ref 1.8–7.3)
NEUTROPHILS RELATIVE PERCENT: 57.9 % (ref 43–80)
PDW BLD-RTO: 12.9 FL (ref 11.5–15)
PLATELET # BLD: 178 E9/L (ref 130–450)
PMV BLD AUTO: 9.8 FL (ref 7–12)
POTASSIUM REFLEX MAGNESIUM: 4.2 MMOL/L (ref 3.5–5)
RBC # BLD: 3.06 E12/L (ref 3.5–5.5)
SODIUM BLD-SCNC: 141 MMOL/L (ref 132–146)
WBC # BLD: 4.9 E9/L (ref 4.5–11.5)

## 2023-02-15 PROCEDURE — 6370000000 HC RX 637 (ALT 250 FOR IP)

## 2023-02-15 PROCEDURE — 2709999900 HC NON-CHARGEABLE SUPPLY: Performed by: ORTHOPAEDIC SURGERY

## 2023-02-15 PROCEDURE — 0PSF04Z REPOSITION RIGHT HUMERAL SHAFT WITH INTERNAL FIXATION DEVICE, OPEN APPROACH: ICD-10-PCS | Performed by: ORTHOPAEDIC SURGERY

## 2023-02-15 PROCEDURE — 2720000010 HC SURG SUPPLY STERILE: Performed by: ORTHOPAEDIC SURGERY

## 2023-02-15 PROCEDURE — 36415 COLL VENOUS BLD VENIPUNCTURE: CPT

## 2023-02-15 PROCEDURE — C1713 ANCHOR/SCREW BN/BN,TIS/BN: HCPCS | Performed by: ORTHOPAEDIC SURGERY

## 2023-02-15 PROCEDURE — 6370000000 HC RX 637 (ALT 250 FOR IP): Performed by: STUDENT IN AN ORGANIZED HEALTH CARE EDUCATION/TRAINING PROGRAM

## 2023-02-15 PROCEDURE — 82962 GLUCOSE BLOOD TEST: CPT

## 2023-02-15 PROCEDURE — 73080 X-RAY EXAM OF ELBOW: CPT

## 2023-02-15 PROCEDURE — 2500000003 HC RX 250 WO HCPCS: Performed by: ANESTHESIOLOGIST ASSISTANT

## 2023-02-15 PROCEDURE — 3700000000 HC ANESTHESIA ATTENDED CARE: Performed by: ORTHOPAEDIC SURGERY

## 2023-02-15 PROCEDURE — 2580000003 HC RX 258: Performed by: STUDENT IN AN ORGANIZED HEALTH CARE EDUCATION/TRAINING PROGRAM

## 2023-02-15 PROCEDURE — 2580000003 HC RX 258: Performed by: ANESTHESIOLOGIST ASSISTANT

## 2023-02-15 PROCEDURE — 6370000000 HC RX 637 (ALT 250 FOR IP): Performed by: ORTHOPAEDIC SURGERY

## 2023-02-15 PROCEDURE — 6360000002 HC RX W HCPCS: Performed by: STUDENT IN AN ORGANIZED HEALTH CARE EDUCATION/TRAINING PROGRAM

## 2023-02-15 PROCEDURE — 6360000002 HC RX W HCPCS: Performed by: ANESTHESIOLOGY

## 2023-02-15 PROCEDURE — 24545 OPTX HUM FX WO NTRCNDYLR XTN: CPT | Performed by: ORTHOPAEDIC SURGERY

## 2023-02-15 PROCEDURE — 3600000005 HC SURGERY LEVEL 5 BASE: Performed by: ORTHOPAEDIC SURGERY

## 2023-02-15 PROCEDURE — 85025 COMPLETE CBC W/AUTO DIFF WBC: CPT

## 2023-02-15 PROCEDURE — 3600000015 HC SURGERY LEVEL 5 ADDTL 15MIN: Performed by: ORTHOPAEDIC SURGERY

## 2023-02-15 PROCEDURE — 3700000001 HC ADD 15 MINUTES (ANESTHESIA): Performed by: ORTHOPAEDIC SURGERY

## 2023-02-15 PROCEDURE — 7100000001 HC PACU RECOVERY - ADDTL 15 MIN: Performed by: ORTHOPAEDIC SURGERY

## 2023-02-15 PROCEDURE — 64415 NJX AA&/STRD BRCH PLXS IMG: CPT | Performed by: ANESTHESIOLOGY

## 2023-02-15 PROCEDURE — 6360000002 HC RX W HCPCS: Performed by: ANESTHESIOLOGIST ASSISTANT

## 2023-02-15 PROCEDURE — 80048 BASIC METABOLIC PNL TOTAL CA: CPT

## 2023-02-15 PROCEDURE — 7100000000 HC PACU RECOVERY - FIRST 15 MIN: Performed by: ORTHOPAEDIC SURGERY

## 2023-02-15 PROCEDURE — 3209999900 FLUORO FOR SURGICAL PROCEDURES

## 2023-02-15 PROCEDURE — 1200000000 HC SEMI PRIVATE

## 2023-02-15 DEVICE — SCREW BNE L20MM DIA3.5MM CORT S STL ST NONCANNULATED LOK: Type: IMPLANTABLE DEVICE | Site: ARM | Status: FUNCTIONAL

## 2023-02-15 DEVICE — IMPLANTABLE DEVICE: Type: IMPLANTABLE DEVICE | Site: ARM | Status: FUNCTIONAL

## 2023-02-15 DEVICE — SCREW BNE L34MM DIA2.7MM MTPHSEAL S STL ST FULL THRD T8: Type: IMPLANTABLE DEVICE | Site: ARM | Status: FUNCTIONAL

## 2023-02-15 DEVICE — SCREW BNE L46MM DIA2.7MM ANK S STL ST VAR ANG LOK FULL THRD: Type: IMPLANTABLE DEVICE | Site: ARM | Status: FUNCTIONAL

## 2023-02-15 DEVICE — SCREW BNE L54MM DIA2.7MM ANK S STL ST VAR ANG LOK FULL THRD: Type: IMPLANTABLE DEVICE | Site: ARM | Status: FUNCTIONAL

## 2023-02-15 DEVICE — SCREW BNE L18MM DIA2.7MM MTPHSEAL S STL ST FULL THRD T8: Type: IMPLANTABLE DEVICE | Site: ARM | Status: FUNCTIONAL

## 2023-02-15 DEVICE — SCREW BNE L60MM DIA2.7MM S STL ST VAR ANG LOK FULL THRD T8: Type: IMPLANTABLE DEVICE | Site: ARM | Status: FUNCTIONAL

## 2023-02-15 DEVICE — SCREW BNE L50MM DIA2.7MM S STL ST VAR ANG LOK FULL THRD T8: Type: IMPLANTABLE DEVICE | Site: ARM | Status: FUNCTIONAL

## 2023-02-15 DEVICE — SCREW BNE L28MM DIA2.7MM MTPHSEAL S STL ST FULL THRD T8: Type: IMPLANTABLE DEVICE | Site: ARM | Status: FUNCTIONAL

## 2023-02-15 DEVICE — SCREW BNE L22MM DIA3.5MM CORT S STL ST NONCANNULATED LOK: Type: IMPLANTABLE DEVICE | Site: ARM | Status: FUNCTIONAL

## 2023-02-15 DEVICE — SCREW BNE L60MM DIA2.7MM MTPHSEAL S STL ST FULL THRD T8: Type: IMPLANTABLE DEVICE | Site: ARM | Status: FUNCTIONAL

## 2023-02-15 DEVICE — SCREW BNE L18MM DIA2.7MM ANK S STL ST VAR ANG LOK FULL THRD: Type: IMPLANTABLE DEVICE | Site: ARM | Status: FUNCTIONAL

## 2023-02-15 DEVICE — SCREW BNE L16MM DIA3.5MM CORT S STL ST NONCANNULATED LOK: Type: IMPLANTABLE DEVICE | Site: ARM | Status: FUNCTIONAL

## 2023-02-15 RX ORDER — BACITRACIN ZINC 500 [USP'U]/G
OINTMENT TOPICAL PRN
Status: DISCONTINUED | OUTPATIENT
Start: 2023-02-15 | End: 2023-02-15 | Stop reason: ALTCHOICE

## 2023-02-15 RX ORDER — FENTANYL CITRATE 50 UG/ML
INJECTION, SOLUTION INTRAMUSCULAR; INTRAVENOUS PRN
Status: DISCONTINUED | OUTPATIENT
Start: 2023-02-15 | End: 2023-02-15 | Stop reason: SDUPTHER

## 2023-02-15 RX ORDER — DEXAMETHASONE SODIUM PHOSPHATE 10 MG/ML
INJECTION INTRAMUSCULAR; INTRAVENOUS PRN
Status: DISCONTINUED | OUTPATIENT
Start: 2023-02-15 | End: 2023-02-15 | Stop reason: SDUPTHER

## 2023-02-15 RX ORDER — CEFAZOLIN SODIUM 1 G/3ML
INJECTION, POWDER, FOR SOLUTION INTRAMUSCULAR; INTRAVENOUS PRN
Status: DISCONTINUED | OUTPATIENT
Start: 2023-02-15 | End: 2023-02-15 | Stop reason: SDUPTHER

## 2023-02-15 RX ORDER — SODIUM CHLORIDE 9 MG/ML
INJECTION, SOLUTION INTRAVENOUS CONTINUOUS PRN
Status: DISCONTINUED | OUTPATIENT
Start: 2023-02-15 | End: 2023-02-15 | Stop reason: SDUPTHER

## 2023-02-15 RX ORDER — ONDANSETRON 2 MG/ML
INJECTION INTRAMUSCULAR; INTRAVENOUS PRN
Status: DISCONTINUED | OUTPATIENT
Start: 2023-02-15 | End: 2023-02-15 | Stop reason: SDUPTHER

## 2023-02-15 RX ORDER — LIDOCAINE HYDROCHLORIDE 20 MG/ML
INJECTION, SOLUTION INTRAVENOUS PRN
Status: DISCONTINUED | OUTPATIENT
Start: 2023-02-15 | End: 2023-02-15 | Stop reason: SDUPTHER

## 2023-02-15 RX ORDER — ROPIVACAINE HYDROCHLORIDE 5 MG/ML
INJECTION, SOLUTION EPIDURAL; INFILTRATION; PERINEURAL
Status: COMPLETED | OUTPATIENT
Start: 2023-02-15 | End: 2023-02-15

## 2023-02-15 RX ORDER — ROPIVACAINE HYDROCHLORIDE 5 MG/ML
30 INJECTION, SOLUTION EPIDURAL; INFILTRATION; PERINEURAL
Status: COMPLETED | OUTPATIENT
Start: 2023-02-15 | End: 2023-02-15

## 2023-02-15 RX ORDER — PROPOFOL 10 MG/ML
INJECTION, EMULSION INTRAVENOUS PRN
Status: DISCONTINUED | OUTPATIENT
Start: 2023-02-15 | End: 2023-02-15 | Stop reason: SDUPTHER

## 2023-02-15 RX ORDER — OXYCODONE HYDROCHLORIDE AND ACETAMINOPHEN 5; 325 MG/1; MG/1
1 TABLET ORAL EVERY 6 HOURS PRN
Qty: 28 TABLET | Refills: 0 | Status: SHIPPED | OUTPATIENT
Start: 2023-02-15 | End: 2023-02-22

## 2023-02-15 RX ORDER — ROCURONIUM BROMIDE 10 MG/ML
INJECTION, SOLUTION INTRAVENOUS PRN
Status: DISCONTINUED | OUTPATIENT
Start: 2023-02-15 | End: 2023-02-15 | Stop reason: SDUPTHER

## 2023-02-15 RX ORDER — MIDAZOLAM HYDROCHLORIDE 2 MG/2ML
1 INJECTION, SOLUTION INTRAMUSCULAR; INTRAVENOUS EVERY 5 MIN PRN
Status: DISCONTINUED | OUTPATIENT
Start: 2023-02-15 | End: 2023-02-15 | Stop reason: HOSPADM

## 2023-02-15 RX ADMIN — LEVETIRACETAM 500 MG: 500 TABLET, FILM COATED ORAL at 23:21

## 2023-02-15 RX ADMIN — AMLODIPINE BESYLATE 5 MG: 5 TABLET ORAL at 13:22

## 2023-02-15 RX ADMIN — LIDOCAINE HYDROCHLORIDE 100 MG: 20 INJECTION, SOLUTION INTRAVENOUS at 09:03

## 2023-02-15 RX ADMIN — ROPIVACAINE HYDROCHLORIDE 30 ML: 5 INJECTION, SOLUTION EPIDURAL; INFILTRATION; PERINEURAL at 08:17

## 2023-02-15 RX ADMIN — MIDAZOLAM 2 MG: 1 INJECTION INTRAMUSCULAR; INTRAVENOUS at 08:16

## 2023-02-15 RX ADMIN — ACETAMINOPHEN 650 MG: 325 TABLET ORAL at 17:57

## 2023-02-15 RX ADMIN — CEFAZOLIN 2 G: 1 INJECTION, POWDER, FOR SOLUTION INTRAMUSCULAR; INTRAVENOUS at 09:15

## 2023-02-15 RX ADMIN — FENTANYL CITRATE 100 MCG: 50 INJECTION, SOLUTION INTRAMUSCULAR; INTRAVENOUS at 09:03

## 2023-02-15 RX ADMIN — PROPOFOL 80 MG: 10 INJECTION, EMULSION INTRAVENOUS at 09:03

## 2023-02-15 RX ADMIN — ACETAMINOPHEN 650 MG: 325 TABLET ORAL at 05:54

## 2023-02-15 RX ADMIN — DEXAMETHASONE SODIUM PHOSPHATE 10 MG: 10 INJECTION INTRAMUSCULAR; INTRAVENOUS at 09:15

## 2023-02-15 RX ADMIN — LEVETIRACETAM 500 MG: 500 TABLET, FILM COATED ORAL at 13:21

## 2023-02-15 RX ADMIN — SODIUM CHLORIDE, PRESERVATIVE FREE 10 ML: 5 INJECTION INTRAVENOUS at 23:21

## 2023-02-15 RX ADMIN — ROPIVACAINE HYDROCHLORIDE 25 ML: 5 INJECTION EPIDURAL; INFILTRATION; PERINEURAL at 08:15

## 2023-02-15 RX ADMIN — BACITRACIN ZINC: 500 OINTMENT TOPICAL at 23:21

## 2023-02-15 RX ADMIN — INSULIN LISPRO 8 UNITS: 100 INJECTION, SOLUTION INTRAVENOUS; SUBCUTANEOUS at 19:09

## 2023-02-15 RX ADMIN — INSULIN LISPRO 4 UNITS: 100 INJECTION, SOLUTION INTRAVENOUS; SUBCUTANEOUS at 13:26

## 2023-02-15 RX ADMIN — SODIUM CHLORIDE: 9 INJECTION, SOLUTION INTRAVENOUS at 08:50

## 2023-02-15 RX ADMIN — ONDANSETRON HYDROCHLORIDE 4 MG: 2 SOLUTION INTRAMUSCULAR; INTRAVENOUS at 09:15

## 2023-02-15 RX ADMIN — CEFAZOLIN 2000 MG: 2 INJECTION, POWDER, FOR SOLUTION INTRAMUSCULAR; INTRAVENOUS at 16:37

## 2023-02-15 RX ADMIN — CEFAZOLIN 2000 MG: 2 INJECTION, POWDER, FOR SOLUTION INTRAMUSCULAR; INTRAVENOUS at 23:22

## 2023-02-15 RX ADMIN — ACETAMINOPHEN 650 MG: 325 TABLET ORAL at 13:21

## 2023-02-15 RX ADMIN — SUGAMMADEX 200 MG: 100 INJECTION, SOLUTION INTRAVENOUS at 10:39

## 2023-02-15 RX ADMIN — ROCURONIUM BROMIDE 50 MG: 10 INJECTION, SOLUTION INTRAVENOUS at 09:03

## 2023-02-15 RX ADMIN — ACETAMINOPHEN 650 MG: 325 TABLET ORAL at 23:21

## 2023-02-15 RX ADMIN — ESCITALOPRAM OXALATE 10 MG: 10 TABLET, FILM COATED ORAL at 13:22

## 2023-02-15 ASSESSMENT — PAIN DESCRIPTION - ORIENTATION
ORIENTATION: RIGHT

## 2023-02-15 ASSESSMENT — PAIN SCALES - GENERAL
PAINLEVEL_OUTOF10: 0
PAINLEVEL_OUTOF10: 2
PAINLEVEL_OUTOF10: 0
PAINLEVEL_OUTOF10: 3
PAINLEVEL_OUTOF10: 0

## 2023-02-15 ASSESSMENT — PAIN DESCRIPTION - DESCRIPTORS
DESCRIPTORS: ACHING
DESCRIPTORS: ACHING;SORE

## 2023-02-15 ASSESSMENT — PAIN DESCRIPTION - LOCATION
LOCATION: ARM
LOCATION: ARM;ELBOW
LOCATION: ARM
LOCATION: ARM

## 2023-02-15 NOTE — CARE COORDINATION
2/15/2023 social work transition of care planning  Sw followed up with pt and family at bedside to discus transition of care planning. Plan remains for home,[facility]-ref[facility] made. Family to transport at discharge.   Electronically signed by COLE aGitan on 2/15/2023 at 12:59 PM

## 2023-02-15 NOTE — PROGRESS NOTES
TRAUMA SURGERY  DAILY PROGRESS NOTE  2/15/2023    CHIEF COMPLAINT:  Chief Complaint   Patient presents with    Consultation     Transfer from [place], fall down steps, right elbow fx, SDH       SUBJECTIVE:  No acute events overnight. Pain controlled. OBJECTIVE:  BP (!) 169/70   Pulse 68   Temp 97.8 °F (36.6 °C) (Temporal)   Resp 16   Wt 134 lb (60.8 kg)   SpO2 98%   BMI 24.51 kg/m²     GENERAL:  NAD. A&Ox3. LUNGS:  No increased work of breathing. CARDIOVASCULAR: RR  ABDOMEN:  Soft, non-distended, non-tender. No guarding, rigidity, rebound.   EXTREMITIES: RUE in sling, wiggles fingers  NEURO: GCS 15    ASSESSMENT/PLAN:  78 y.o. female s/p mechanical fall with R subdural hematoma, stable on repeat CT, and R humerus fracture    Neuro exam without deficts, stable SDH  Neurosurgery following -- Keppra 500mg bid for 7 days  Ortho following for humerus fx -- NWB, OR today  Hx of diabetes -- monitor BG, carb control diet after OR  Multimodal pain control, Ambulate, SMI  PTOT -- [facility] 18/24  DVT PPx post-op    Lorena Young DO  Surgery Resident PGY-5  2/15/2023  5:50 AM

## 2023-02-15 NOTE — ANESTHESIA PRE PROCEDURE
Department of Anesthesiology  Preprocedure Note       Name:  Viry Chaidez   Age:  78 y.o.  :  1945                                          MRN:  [id number]         Date:  2/15/2023      Surgeon: Silvana James):  Noa Otero DO    Procedure: Procedure(s):  RIGHT HUMERUS OPEN REDUCTION INTERNAL FIXATION    Medications prior to admission:   Prior to Admission medications    Medication Sig Start Date End Date Taking? Authorizing Provider   traMADol (ULTRAM) 50 MG tablet TAKE 1 TABLET BY MOUTH EVERY 4 TO 6 HOURS AS NEEDED FOR PAIN 22   Historical Provider, MD   magnesium (MAGNESIUM-OXIDE) 250 MG TABS tablet Take 250 mg by mouth daily    Historical Provider, MD   amLODIPine (NORVASC) 5 MG tablet Take 1 tablet by mouth daily 22   Lazarus Handler, MD   diclofenac sodium (VOLTAREN) 1 % GEL Apply 2 g topically 2 times daily 22   Lazarus Handler, MD   escitalopram (LEXAPRO) 10 MG tablet Take 1 tablet by mouth daily 21   Lazarus Handler, MD   ibuprofen (ADVIL;MOTRIN) 800 MG tablet Take 1 tablet by mouth daily as needed for Pain 21   Lazarus Handler, MD   glipiZIDE (GLUCOTROL) 5 MG tablet Take 2 tablets by mouth 2 times daily    Historical Provider, MD   metFORMIN (GLUCOPHAGE) 500 MG tablet Take 2 tablets by mouth 2 times daily    Historical Provider, MD   vitamin E 600 units capsule Take 600 Units by mouth daily    Historical Provider, MD   vitamin C (ASCORBIC ACID) 500 MG tablet Take 500 mg by mouth daily    Historical Provider, MD   Multiple Vitamins-Minerals (MULTIVITAMIN PO) Take by mouth    Historical Provider, MD   Biotin 5000 MCG TABS Take by mouth    Historical Provider, MD   Omega-3 Fatty Acids (OMEGA 3 PO) Take by mouth    Historical Provider, MD   pioglitazone (ACTOS) 45 MG tablet Take 45 mg by mouth daily. Historical Provider, MD   enalapril (VASOTEC) 20 MG tablet Take 20 mg by mouth 2 times daily.     Historical Provider, MD       Current medications:    Current Facility-Administered Medications   Medication Dose Route Frequency Provider Last Rate Last Admin    insulin lispro (HUMALOG) injection vial 0-16 Units  0-16 Units SubCUTAneous TID WC Jacy Grant MD        insulin lispro (HUMALOG) injection vial 0-4 Units  0-4 Units SubCUTAneous Nightly Carl Palomo MD        0.9 % sodium chloride infusion   IntraVENous Continuous Carl Palomo MD 50 mL/hr at 02/14/23 1027 Rate Change at 02/14/23 1027    amLODIPine (NORVASC) tablet 5 mg  5 mg Oral Daily Rudi Bagent, DO   5 mg at 02/14/23 1028    [Held by provider] enalapril (VASOTEC) tablet 20 mg  20 mg Oral BID Rudi Bagent, DO   20 mg at 02/13/23 1029    sodium chloride flush 0.9 % injection 10 mL  10 mL IntraVENous 2 times per day Carl Palomo MD   10 mL at 02/13/23 2045    sodium chloride flush 0.9 % injection 10 mL  10 mL IntraVENous PRN Carl Palomo MD        0.9 % sodium chloride infusion   IntraVENous PRN Carl Palomo MD        ondansetron (ZOFRAN-ODT) disintegrating tablet 4 mg  4 mg Oral Q8H PRN Carl Palomo MD        Or    ondansetron (ZOFRAN) injection 4 mg  4 mg IntraVENous Q6H PRN Carl Palomo MD        polyethylene glycol (GLYCOLAX) packet 17 g  17 g Oral Daily Carl Palomo MD   17 g at 02/14/23 1028    levETIRAcetam (KEPPRA) tablet 500 mg  500 mg Oral BID Carl Palomo MD   500 mg at 02/14/23 2116    acetaminophen (TYLENOL) tablet 650 mg  650 mg Oral Q6H Jacy Bermudez MD   650 mg at 02/15/23 0554    oxyCODONE (ROXICODONE) immediate release tablet 2.5 mg  2.5 mg Oral Q4H PRN Carl Palomo MD   2.5 mg at 02/14/23 2115    bacitracin zinc ointment   Topical TID Carl Palomo MD   Given at 02/14/23 2108    escitalopram (LEXAPRO) tablet 10 mg  10 mg Oral Daily Carl Palomo MD   10 mg at 02/14/23 1028    labetalol (NORMODYNE;TRANDATE) injection 10 mg  10 mg IntraVENous Q4H PRN Carl Palomo MD        hydrALAZINE (APRESOLINE) injection 10 mg  10 mg IntraVENous Q30 Min PRN Poncho Pierre MD   10 mg at 02/12/23 1432    glucose chewable tablet 16 g  4 tablet Oral PRN Poncho Pierre MD        dextrose bolus 10% 125 mL  125 mL IntraVENous PRN Poncho Pierre MD        Or    dextrose bolus 10% 250 mL  250 mL IntraVENous PRN Poncho Pierre MD        glucagon injection 1 mg  1 mg SubCUTAneous PRN Poncho Pierre MD        dextrose 10 % infusion   IntraVENous Continuous PRN Poncho Pierre MD           Allergies:  No Known Allergies    Problem List:    Patient Active Problem List   Diagnosis Code    Disability of walking R26.2    Onychomycosis B35.1    Pain in limb M79.609    Lumbar disc disease with radiculopathy M51.16    Mammogram abnormal R92.8    Hyperlipidemia E78.5    Hypertension I10    Diverticulosis K57.90    Type 2 diabetes mellitus E11.9    Chronic renal disease, stage III ([facility] [address] [693605] N18.30    Arthritis of left foot M19.072    Injury of left foot S99.922A    Tendinitis of left foot M77.52    Pain in left foot M79.672    Trauma T14.90XA    Subdural hematoma S06. 5XAA       Past Medical History:        Diagnosis Date    Arthritis     Carpal tunnel syndrome     Chronic back pain     Corns and callosities     Diabetes mellitus (HCC)     Fibromyositis     Hyperlipidemia     Hypertension     Onychomycosis     Osteoarthritis     Osteoporosis     Peripheral vascular disease (HCC)     Sciatica     Type 2 diabetes mellitus without complication (HCC)        Past Surgical History:        Procedure Laterality Date    ABDOMEN SURGERY      hysterectomy    BACK SURGERY      BACK SURGERY      3 back surgerys 3-4 year ago    BREAST BIOPSY Right     stereotactic    CARPAL TUNNEL RELEASE  2019    Left    HYSTERECTOMY (CERVIX STATUS UNKNOWN)      SHOULDER SURGERY      RTC, B    SPINAL FUSION      lumbar    TONSILLECTOMY         Social History:    Social History     Tobacco Use    Smoking status: Never    Smokeless tobacco: Never   Substance Use Topics    Alcohol use: No                                Counseling given: Not Answered      Vital Signs (Current):   Vitals:    02/14/23 2100 02/14/23 2145 02/15/23 0400 02/15/23 0445   BP: (!) 193/77  (!) 148/65 (!) 169/70   Pulse: 70  62 68   Resp: 16 16 16 16   Temp: 98.4 °F (36.9 °C)  97.4 °F (36.3 °C) 97.8 °F (36.6 °C)   TempSrc: Temporal  Temporal Temporal   SpO2: 97%  97% 98%   Weight:                                                  BP Readings from Last 3 Encounters:   02/15/23 (!) 169/70   08/30/22 136/82   04/15/22 (!) 178/86       NPO Status:                                                                                 BMI:   Wt Readings from Last 3 Encounters:   02/12/23 134 lb (60.8 kg)   02/10/23 134 lb (60.8 kg)   01/20/23 140 lb (63.5 kg)     Body mass index is 24.51 kg/m². CBC:   Lab Results   Component Value Date/Time    WBC 4.9 02/15/2023 05:00 AM    RBC 3.06 02/15/2023 05:00 AM    HGB 9.7 02/15/2023 05:00 AM    HCT 29.6 02/15/2023 05:00 AM    MCV 96.7 02/15/2023 05:00 AM    RDW 12.9 02/15/2023 05:00 AM     02/15/2023 05:00 AM       CMP:   Lab Results   Component Value Date/Time     02/15/2023 05:00 AM    K 4.2 02/15/2023 05:00 AM     02/15/2023 05:00 AM    CO2 22 02/15/2023 05:00 AM    BUN 22 02/15/2023 05:00 AM    CREATININE 0.9 02/15/2023 05:00 AM    GFRAA 53 08/30/2022 12:22 PM    AGRATIO 1.4 02/12/2023 10:25 AM    LABGLOM >60 02/15/2023 05:00 AM    GLUCOSE 145 02/15/2023 05:00 AM    PROT 7.3 02/12/2023 10:25 AM    CALCIUM 8.6 02/15/2023 05:00 AM    BILITOT 0.4 02/12/2023 10:25 AM    ALKPHOS 63 02/12/2023 10:25 AM    AST 23 02/12/2023 10:25 AM    ALT 22 02/12/2023 10:25 AM       POC Tests: No results for input(s): POCGLU, POCNA, POCK, POCCL, POCBUN, POCHEMO, POCHCT in the last 72 hours.     Coags:   Lab Results   Component Value Date/Time    PROTIME 10.9 02/12/2023 10:25 AM    INR 1.00 02/12/2023 10:25 AM    APTT 28.9 02/12/2023 10:25 AM HCG (If Applicable): No results found for: PREGTESTUR, PREGSERUM, HCG, HCGQUANT     ABGs: No results found for: PHART, PO2ART, CZR0CML, VWJ3IWD, BEART, V0EXDSUR     Type & Screen (If Applicable):  No results found for: LABABO, LABRH    Drug/Infectious Status (If Applicable):  No results found for: HIV, HEPCAB    COVID-19 Screening (If Applicable): No results found for: COVID19        Anesthesia Evaluation    Airway: Mallampati: II  TM distance: >3 FB     Mouth opening: > = 3 FB   Dental:    (+) upper dentures      Pulmonary: breath sounds clear to auscultation                             Cardiovascular:    (+) hypertension:, hyperlipidemia        Rhythm: regular                      Neuro/Psych:   (+) neuromuscular disease:,             GI/Hepatic/Renal:            ROS comment: Diverticulosis. Endo/Other:    (+) DiabetesType II DM, , .                  ROS comment: HbA1c 7.4 Abdominal:             Vascular: Other Findings:           Anesthesia Plan      general     ASA 3       Induction: intravenous. MIPS: Postoperative opioids intended and Prophylactic antiemetics administered. Anesthetic plan and risks discussed with patient. Plan discussed with CRNA.           Post-op pain plan if not by surgeon: single peripheral nerve block            Sina Nance MD   2/15/2023

## 2023-02-15 NOTE — ANESTHESIA PROCEDURE NOTES
Peripheral Block    Patient location during procedure: procedure area  Reason for block: post-op pain management and at surgeon's request  Start time: 2/15/2023 8:15 AM  End time: 2/15/2023 8:20 AM  Staffing  Performed: anesthesiologist   Anesthesiologist: Bethany Bolden MD  Preanesthetic Checklist  Completed: patient identified, IV checked, site marked, risks and benefits discussed, surgical/procedural consents, equipment checked, pre-op evaluation, timeout performed, anesthesia consent given, oxygen available and monitors applied/VS acknowledged  Peripheral Block   Patient position: supine  Prep: alcohol swabs  Provider prep: mask and sterile gloves  Patient monitoring: cardiac monitor, continuous pulse ox, frequent blood pressure checks, IV access, oxygen and responsive to questions  Block type: Brachial plexus  Supraclavicular  Laterality: right  Injection technique: single-shot  Guidance: ultrasound guided  Infiltration strength: 0 %  Dose: 0 mL    Needle   Needle type: short-bevel   Needle gauge: 22 G  Needle localization: ultrasound guidance  Needle length: 8 cm  Assessment   Injection assessment: negative aspiration for heme, no paresthesia on injection and local visualized surrounding nerve on ultrasound  Paresthesia pain: none  Slow fractionated injection: yes  Hemodynamics: stable  Real-time US image taken/store: yes  Outcomes: uncomplicated    Additional Notes  Mixture of 0.5% Ropivacaine 25 mL  injected in incremental doses of 5 mL after negative blood aspiration.   Medications Administered  ropivacaine (NAROPIN) injection 0.5% - Perineural   25 mL - 2/15/2023 8:15:00 AM

## 2023-02-15 NOTE — OP NOTE
Operative Note      Patient: Bailey Mitchell  YOB: 1945  MRN: [id number]    Date of Procedure: 2/15/2023    Pre-Op Diagnosis: Right transcondylar distal humerus fracture     Post-Op Diagnosis: Same       Procedure(s):  Open reduction total fixation right transcondylar distal humerus fracture    Surgeon(s):  Prachi Montiel DO    Assistant:   Resident: Vadim Porter DO; Kylie Choi DO; Trena Harmon DO    Anesthesia: General    Estimated Blood Loss (mL): 93AO    Complications: None    Specimens:   * No specimens in log *    Implants:  * No implants in log *      Drains: * No LDAs found *    Findings: transverse comminuted distal humerus fracture    Detailed Description of Procedure: The patient was seen and identified in the preoperative holding area. The right upper extremity was marked and agreed on by patient and staff. The patient was then taken to the operating room transferred to the operative bed and sedated under the care of the anesthesia team.  Next a tourniquet was placed high on the right upper extremity. Patient was turned onto her side with her right arm up in the left lateral decubitus position with her right arm over a bone foam.  Bony prominences were well-padded. The arm was then prepped and draped in normal sterile fashion. An incision was marked out over the posterior aspect of the right arm curving laterally over the olecranon. Next, the arm was exsanguinated and the tourniquet was inflated to 250 mmHg. Next, an incision was made through the skin using a 15 blade scalpel. Large subcutaneous skin flaps were elevated both medially and laterally. Next the ulnar nerve was dissected out bluntly using dissecting scissors. This was freed up both proximally and distally allowing for the nerve to be moved freely without tension. The periosteum was elevated off the medial aspect of the humerus down to the medial epicondyles and the fracture was identified. Next the lateral humerus was dissected through the a lateral para tricipital approach. The periosteum was freed from the lateral humerus and this was elevated over to the olecranon fossa. The fracture was identified on the lateral humerus as well. The fracture was then cleaned of all hematoma and debris. Fracture was reduced and held in place with cross K wires. Next, a medial distal humerus precontoured Synthes plate was placed on the medial side of the distal humerus. The plate was compressed to bone and a proximal cortical screw was placed in a compression mode. This compressed the fracture nicely. Next a metaphyseal screw was placed across the distal fragment compressing the medial plate to the bone. 1 more locking screw was placed across the distal fragment incorporating both medial and lateral columns. We palpated in the olecranon fossa and was unable to feel either screw. Next, a posterior lateral precontoured distal humerus Synthes plate was applied to the lateral humerus. 2 screws locking screws were placed distally into the capitellum. Next, the fracture was again compressed using a proximal cortical screw. 3 more cortical screws were placed into the proximal fragment. Next, fluoroscopy was brought in confirming appropriate reduction and good placement of all the plates. Under fluoroscopic guidance 2 more screws were drilled from the lateral plate across the medial and lateral columns skin care and direct the screws away from the joint. Next, AP and lateral x-rays were obtained showing good placement of all plates and reduction, as well as no screws penetrating the ulnohumeral or radiocapitellar joint. The elbow was ranged and the fracture was found to be stable. No crepitus within the joint with range of motion of the joint. The wound was then copiously irrigated with normal saline. The fascia of the triceps was closed laterally.   Next, 2-0 Monocryl suture was used to close subcutaneous tissue followed by 3-0 nylon for the skin. A sterile dressing was applied followed by a well-padded long-arm posterior splint. The tourniquet was then deflated. The patient was transferred back to her hospital bed, again from anesthesia, and taken to the PACU in stable condition. Postoperative plan    Patient will recover in the PACU. She will will then be transferred back to her hospital room. She will receive 24 hours postop antibiotics. She will remain in the splint until follow-up in the office in 2 weeks at which time we may transition her to a sling to allow for gentle range of motion. We will remove her sutures in 2 weeks. It should be noted that Dr. Mandie Castro was present for the entirety of the procedure. Electronically signed by Phyllis Ivey DO on 2/15/2023    Orthopaedic Attending    I was present and scrubbed throughout the entire procedure. I was involved with all aspects of patient surgical care today. Patient was initially seen by my partner and treatment options were discussed. Patient elected for surgical intervention. I had lengthy discussion with patient regarding their diagnosis, typical prognosis, and expected outcomes. We reviewed the possible complications from the injury itself despite treatment chosen. We also discussed treatment options including nonoperative managements versus surgical management, along with risks and benefits of each. Patient has elected for surgical management despite associated risks. I have explained the risks and complications of the recommended surgery with the patient at length, as well as discussed potential treatment alternatives including nonoperative management.  These risks include but are not limited to death or complication from anesthesia, continued pain, nerve tendon or vascular injury, infection, nonunion or malunion, symptomatic hardware or hardware failure, deep vein thrombosis or pulmonary embolism, stiffness, unforeseen complications, and need for further surgery, etc.  Patient understood this, asked appropriate questions, which were all answered, and she has elected to proceed with the procedure.     Electronically signed by   Juliann Wilson DO  2/15/2023

## 2023-02-15 NOTE — DISCHARGE INSTRUCTIONS
[facility]) Department of Orthopedic Surgery  [address]    Dr. Bety Reyna, DO         MD Dr. Dulce Bender MD Elson Heymann, PA-C Wendolyn Remedies PA-C Demetra Cloud PA-C      Orthopaedics Discharge Instructions   Weight bearing Status - Non-weight bearing - on right upper Extremity  Pain medication Per Prescriptions  Contact Office for Medication Refill- [phone]  Office can refill pain med every 7 days  If patient discharging to facility then pain control will be continued per facility physician  Ice to operative/injured site for 15-30 minutes of each hour for next 5 days    Recommend that you continue to ice the area 2-3 times per day after this   Elevate operative/injured limb on 2 pillows at home  Goal is to have limb above the heart if able   Wound care - leave splint in place until follow up   Follow Up in Office in 2 weeks. Your first post op appointment is often with one of our PAs. Call the office at [phone] or directions or with any questions. Watch for these significant complications. Call physician if they or any other problems occur:  Fever over 101°, redness, swelling or warmth at the operative site  Unrelieved nausea    Foul smelling or cloudy drainage at the operative site   Unrelieved pain    Blood soaked dressing. (Some oozing may be normal)     Numb, pale, blue, cold or tingling extremity    Future Appointments   Date Time Provider Mariam Pleitez   2/27/2023  1:00 PM SE Jackson ORTHO APC [facility]   3/16/2023  2:30 PM Arlen Gray PA-C [facility]   3/17/2023  8:00 AM Mariano Baker DPM [facility]         It is the Department of Orthopaedic Trauma's standard of practice that providers will de-escalate(wean) all patients from narcotic(opioid) medications during the post-operative period.    We provide multimodal pain control but opioid medications are tapered in all of our patients. If patient requires referral to pain management for prolonged taper off of opioid pain medication we will facilitate this process. TRAUMA SERVICES DISCHARGE INSTRUCTIONS    Call [phone], option 2, for any questions/concerns and for follow-up appointment in 1 week(s). Please follow the instructions checked below:  Please follow-up with your primary care provider. ACTIVITY INSTRUCTIONS  Increase activity as tolerated  No heavy lifting or strenuous activity  Take your incentive spirometer home and use 4-6 times/day   [x]  No driving until cleared by Orthopedic surgery. WOUND/DRESSING INSTRUCTIONS:  You may shower. No sitting in bath tub, hot tub or swimming until cleared by physician. Ice to areas of pain for first 24 hours. Heat to areas of pain after that. Wash areas of lacerations/abrasions with soap & water. Rinse well. Pat dry with clean towel. Keep wounds clean and dry. MEDICATION INSTRUCTIONS  Take medication as prescribed. When taking pain medications, you may experience dizziness or drowsiness. Do not drink alcohol or drive when taking these medications. You may experience constipation while taking pain medication. You may take over the counter stool softeners such as docusate (Colace), sennosides S (Senokot-S), or Miralax. [x]  You may take acetaminophen (Tylenol) products. Do NOT take more than 4000mg of Tylenol in 24h. OPIOID MEDICATION INSTRUCTIONS  Read the medication guide that is included with your prescription. Take your medication exactly as prescribed. Store medication away from children and in a safe place. Do NOT share your medication with others. Do NOT take medication unless it is prescribed for you. Do NOT drink alcohol while taking opioids (I.e., Norco, Percocet, Oxycodone, etc). Discuss with the Trauma Clinic staff if the dose of medication you are taking does not control your pain and any side effects that you may be having. CALL 911 OR YOUR LOCAL EMERGENCY SERVICE:  --If you take too much medication  --If you have trouble breathing or shortness of breath  --A child has taken this medication. WORK:  You may not return to work until you receive follow-up with the Trauma Clinic or clearance by all consultants. Call the trauma clinic for any of the following or for questions/concerns;  --fever over 101F  --redness, swelling, hardness or warmth at the wound site(s). --Unrelieved nausea/vomiting  --Foul smelling or cloudy drainage at the wound site(s)  --Unrelieved pain or increase in pain  --Increase in shortness of breath    Follow-up:  Trauma Clinic: [phone] option [address]    SPECIAL CONSIDERATIONS FOR OUR PATIENTS OVER THE AGE OF 65Y    Getting around your home safely can be a challenge if you have injuries or health problems that make it easy for you to fall. Loose rugs and furniture in walkways are among the dangers for many older people who have problems walking or who have poor eyesight. People who have conditions such as arthritis, osteoporosis, or dementia also must be careful not to fall. You can make your home safer with a few simple measures. Follow-up care is a key part of your treatment and safety. Be sure to make and go to all appointments, and call your doctor or nurse call line if you are having problems. It's also a good idea to know your test results and keep a list of the medicines you take. How can you care for yourself at home? Taking care of yourself  You may get dizzy if you do not drink enough water. To prevent dehydration, drink plenty of fluids, enough so that your urine is light yellow or clear like water. Choose water and other caffeine-free clear liquids.  If you have kidney, heart, or liver disease and have to limit fluids, talk with your doctor before you increase the amount of fluids you drink.  Exercise regularly to improve your strength, muscle tone, and balance. Walk if you can. Swimming may be a good choice if you cannot walk easily. Have your vision and hearing checked each year or any time you notice a change. If you have trouble seeing and hearing, you might not be able to avoid objects and could lose your balance. Know the side effects of the medicines you take. Ask your doctor or pharmacist whether the medicines you take can affect your balance. Sleeping pills or sedatives can affect your balance. Limit the amount of alcohol you drink. Alcohol can impair your balance and other senses. Ask your doctor whether calluses or corns on your feet need to be removed. If you wear loose-fitting shoes because of calluses or corns, you can lose your balance and fall. Talk to your doctor if you have numbness in your feet. Preventing falls at home  Remove raised doorway thresholds, throw rugs, and clutter. Repair loose carpet or raised areas in the floor. Move furniture and electrical cords to keep them out of walking paths. Use non-skid floor wax, and wipe up spills right away, especially on ceramic tile floors. If you use a walker or cane, put rubber tips on it. If you use crutches, clean the bottoms of them regularly with an abrasive pad, such as steel wool. Keep your house well lit, especially stairways, porches, and outside walkways. Use night-lights in areas such as hallways and washrooms. Add extra light switches or use remote switches (such as switches that go on or off when you clap your hands) to make it easier to turn lights on if you have to get up during the night. Install sturdy handrails on stairways. Move items in your cabinets so that the things you use a lot are on the lower shelves (about waist level). Keep a cordless phone and a flashlight with new batteries by your bed.  If possible, put a phone in each of the main rooms of your house, or carry a cell phone in case you fall and cannot reach a phone. Or, you can wear a device around your neck or wrist. You push a button that sends a signal for help. Wear low-heeled shoes that fit well and give your feet good support. Use footwear with non-skid soles. Check the heels and soles of your shoes for wear. Repair or replace worn heels or soles. Do not wear socks without shoes on wood floors. Walk on the grass when the sidewalks are slippery. If you live in an area that gets snow and ice in the winter, sprinkle salt on slippery steps and sidewalks. Preventing falls in the bath  Install grab bars and non-skid mats inside and outside your shower or tub and near the toilet and sinks. Use shower chairs and bath benches. Use a hand-held shower head that will allow you to sit while showering. Get into a tub or shower by putting the weaker leg in first. Get out of a tub or shower with your strong side first.  Repair loose toilet seats and consider installing a raised toilet seat to make getting on and off the toilet easier. Keep your washroom door unlocked while you are in the shower. Follow-up:  Trauma Clinic: [phone] option 2  [address] [address], [address] [place] [address] Road    Call [phone], option 2, for any questions/concerns and for follow-up appointment in 2 week(s). Please follow the instructions checked below:    During the course of your workup, we identified an incidental finding of:  N/A  Please follow-up with your primary care provider. ACTIVITY INSTRUCTIONS  Increase activity as tolerated  No heavy lifting or strenuous activity  Take your incentive spirometer home and use 4-6 times/day   []  No driving until cleared by Ortho/Trauma services    WOUND/DRESSING INSTRUCTIONS:  You may shower. No sitting in bath tub, hot tub or swimming until cleared by physician. Ice to areas of pain for first 24 hours.   Heat to areas of pain after that. Wash areas of lacerations/abrasions with soap & water. Rinse well. Pat dry with clean towel. Apply thin layer of Bacitracin, Neosporin, or triple antibiotic cream to affected area 2-3 times per day. Keep wounds clean and dry. [facility] MEDICATION INSTRUCTIONS  Take medication as prescribed. When taking pain medications, you may experience dizziness or drowsiness. Do not drink alcohol or drive when taking these medications. You may experience constipation while taking pain medication. You may take over the counter stool softeners such as docusate (Colace), sennosides S (Senokot-S), or Miralax.   []  You may take Ibuprofen (over the counter) as directed for mild pain. --You may take up to 800mg every 8 hours for pain, please take with food or milk. []  Do not take any other acetaminophen (Tylenol) products if you are taking Percocet or Norco, as these contain Tylenol. --Do NOT take more than 4000mg of Tylenol in 24h. OPIOID MEDICATION INSTRUCTIONS  Read the medication guide that is included with your prescription. Take your medication exactly as prescribed. Store medication away from children and in a safe place. Do NOT share your medication with others. Do NOT take medication unless it is prescribed for you. Do NOT drink alcohol while taking opioids (I.e., Norco, Percocet, Oxycodone, etc). Discuss with the Trauma Clinic staff if the dose of medication you are taking does not control your pain and any side effects that you may be having. CALL 911 OR YOUR LOCAL EMERGENCY SERVICE:  --If you take too much medication  --If you have trouble breathing or shortness of breath  --A child has taken this medication. WORK:  You may not return to work until you receive follow-up with the Trauma Clinic or clearance by all consultants.     Call the trauma clinic for any of the following or for questions/concerns;  --fever over 101F  --redness, swelling, hardness or warmth at the wound site(s).   --Unrelieved nausea/vomiting  --Foul smelling or cloudy drainage at the wound site(s)  --Unrelieved pain or increase in pain  --Increase in shortness of breath    Follow-up:  Trauma Clinic: [phone] option [address]  [place], [address] [address]

## 2023-02-15 NOTE — ANESTHESIA POSTPROCEDURE EVALUATION
Department of Anesthesiology  Postprocedure Note    Patient: Miya Jhonston  MRN: [id number]  YOB: 1945  Date of evaluation: 2/15/2023      Procedure Summary     Date: 02/15/23 [facility]    Anesthesia Start: 1419 Anesthesia Stop: 1117    Procedure: RIGHT HUMERUS OPEN REDUCTION INTERNAL FIXATION (Right: Arm Upper) Diagnosis:       Closed fracture of distal end of right humerus, unspecified fracture morphology, initial encounter      (Closed fracture of distal end of right humerus, unspecified fracture morphology, initial encounter [S42.401A])    Surgeons: Alex Lorenz DO Responsible Provider: Edilson Whitehead MD    Anesthesia Type: general ASA Status: 3          Anesthesia Type: No value filed.     [place] Phase I:      [place] Phase II:        Anesthesia Post Evaluation    Patient location during evaluation: PACU  Patient participation: complete - patient participated  Level of consciousness: awake  Pain score: 0  Airway patency: patent  Nausea & Vomiting: no nausea and no vomiting  Complications: no  Cardiovascular status: blood pressure returned to baseline  Respiratory status: acceptable  Hydration status: hypervolemic  Multimodal analgesia pain management approach

## 2023-02-15 NOTE — PROGRESS NOTES
1100  transferred from surgery post humerus right arm in sling. Fingers warm. Capillary refill brisk. Post nerve block. Pain free. No movement of fingers. Ice applied. Yung 12 completed.

## 2023-02-16 ENCOUNTER — TELEPHONE (OUTPATIENT)
Dept: PODIATRY | Age: 78
End: 2023-02-16

## 2023-02-16 VITALS
DIASTOLIC BLOOD PRESSURE: 57 MMHG | SYSTOLIC BLOOD PRESSURE: 130 MMHG | OXYGEN SATURATION: 97 % | BODY MASS INDEX: 24.51 KG/M2 | WEIGHT: 134 LBS | HEART RATE: 64 BPM | RESPIRATION RATE: 16 BRPM | TEMPERATURE: 98.4 F

## 2023-02-16 PROBLEM — S42.401D CLOSED FRACTURE OF DISTAL END OF RIGHT HUMERUS WITH ROUTINE HEALING: Status: ACTIVE | Noted: 2023-02-16

## 2023-02-16 LAB
ANION GAP SERPL CALCULATED.3IONS-SCNC: 10 MMOL/L (ref 7–16)
BASOPHILS ABSOLUTE: 0.01 E9/L (ref 0–0.2)
BASOPHILS RELATIVE PERCENT: 0.1 % (ref 0–2)
BUN BLDV-MCNC: 27 MG/DL (ref 6–23)
CALCIUM SERPL-MCNC: 8.9 MG/DL (ref 8.6–10.2)
CHLORIDE BLD-SCNC: 107 MMOL/L (ref 98–107)
CO2: 20 MMOL/L (ref 22–29)
CREAT SERPL-MCNC: 1.1 MG/DL (ref 0.5–1)
EOSINOPHILS ABSOLUTE: 0 E9/L (ref 0.05–0.5)
EOSINOPHILS RELATIVE PERCENT: 0 % (ref 0–6)
GFR SERPL CREATININE-BSD FRML MDRD: 52 ML/MIN/1.73
GLUCOSE BLD-MCNC: 179 MG/DL (ref 74–99)
HCT VFR BLD CALC: 28.5 % (ref 34–48)
HEMOGLOBIN: 9.5 G/DL (ref 11.5–15.5)
IMMATURE GRANULOCYTES #: 0.03 E9/L
IMMATURE GRANULOCYTES %: 0.4 % (ref 0–5)
LYMPHOCYTES ABSOLUTE: 1.17 E9/L (ref 1.5–4)
LYMPHOCYTES RELATIVE PERCENT: 14 % (ref 20–42)
MCH RBC QN AUTO: 31.6 PG (ref 26–35)
MCHC RBC AUTO-ENTMCNC: 33.3 % (ref 32–34.5)
MCV RBC AUTO: 94.7 FL (ref 80–99.9)
METER GLUCOSE: 170 MG/DL (ref 74–99)
METER GLUCOSE: 278 MG/DL (ref 74–99)
MONOCYTES ABSOLUTE: 0.73 E9/L (ref 0.1–0.95)
MONOCYTES RELATIVE PERCENT: 8.8 % (ref 2–12)
NEUTROPHILS ABSOLUTE: 6.4 E9/L (ref 1.8–7.3)
NEUTROPHILS RELATIVE PERCENT: 76.7 % (ref 43–80)
PDW BLD-RTO: 12.6 FL (ref 11.5–15)
PLATELET # BLD: 195 E9/L (ref 130–450)
PMV BLD AUTO: 10.2 FL (ref 7–12)
POTASSIUM REFLEX MAGNESIUM: 4.3 MMOL/L (ref 3.5–5)
RBC # BLD: 3.01 E12/L (ref 3.5–5.5)
SODIUM BLD-SCNC: 137 MMOL/L (ref 132–146)
WBC # BLD: 8.3 E9/L (ref 4.5–11.5)

## 2023-02-16 PROCEDURE — 2580000003 HC RX 258: Performed by: STUDENT IN AN ORGANIZED HEALTH CARE EDUCATION/TRAINING PROGRAM

## 2023-02-16 PROCEDURE — 82962 GLUCOSE BLOOD TEST: CPT

## 2023-02-16 PROCEDURE — 6370000000 HC RX 637 (ALT 250 FOR IP): Performed by: STUDENT IN AN ORGANIZED HEALTH CARE EDUCATION/TRAINING PROGRAM

## 2023-02-16 PROCEDURE — 6360000002 HC RX W HCPCS: Performed by: STUDENT IN AN ORGANIZED HEALTH CARE EDUCATION/TRAINING PROGRAM

## 2023-02-16 PROCEDURE — 80048 BASIC METABOLIC PNL TOTAL CA: CPT

## 2023-02-16 PROCEDURE — 85025 COMPLETE CBC W/AUTO DIFF WBC: CPT

## 2023-02-16 PROCEDURE — 36415 COLL VENOUS BLD VENIPUNCTURE: CPT

## 2023-02-16 PROCEDURE — 97535 SELF CARE MNGMENT TRAINING: CPT

## 2023-02-16 PROCEDURE — 97530 THERAPEUTIC ACTIVITIES: CPT

## 2023-02-16 RX ORDER — LEVETIRACETAM 500 MG/1
500 TABLET ORAL 2 TIMES DAILY
Qty: 5 TABLET | Refills: 0 | Status: SHIPPED | OUTPATIENT
Start: 2023-02-16 | End: 2023-02-19

## 2023-02-16 RX ORDER — ONDANSETRON 2 MG/ML
4 INJECTION INTRAMUSCULAR; INTRAVENOUS EVERY 6 HOURS PRN
Qty: 84 ML | Status: CANCELLED | OUTPATIENT
Start: 2023-02-16

## 2023-02-16 RX ORDER — TRAMADOL HYDROCHLORIDE 50 MG/1
50 TABLET ORAL EVERY 6 HOURS PRN
Status: DISCONTINUED | OUTPATIENT
Start: 2023-02-16 | End: 2023-02-16 | Stop reason: HOSPADM

## 2023-02-16 RX ORDER — GLIPIZIDE 5 MG/1
10 TABLET ORAL 2 TIMES DAILY
Status: DISCONTINUED | OUTPATIENT
Start: 2023-02-16 | End: 2023-02-16 | Stop reason: HOSPADM

## 2023-02-16 RX ORDER — PIOGLITAZONEHYDROCHLORIDE 15 MG/1
45 TABLET ORAL DAILY
Status: DISCONTINUED | OUTPATIENT
Start: 2023-02-16 | End: 2023-02-16 | Stop reason: HOSPADM

## 2023-02-16 RX ORDER — ONDANSETRON 4 MG/1
4 TABLET, ORALLY DISINTEGRATING ORAL EVERY 8 HOURS PRN
Status: CANCELLED | OUTPATIENT
Start: 2023-02-16

## 2023-02-16 RX ADMIN — GLIPIZIDE 10 MG: 5 TABLET ORAL at 09:56

## 2023-02-16 RX ADMIN — OXYCODONE HYDROCHLORIDE 2.5 MG: 5 TABLET ORAL at 03:37

## 2023-02-16 RX ADMIN — LEVETIRACETAM 500 MG: 500 TABLET, FILM COATED ORAL at 09:57

## 2023-02-16 RX ADMIN — SODIUM CHLORIDE, PRESERVATIVE FREE 10 ML: 5 INJECTION INTRAVENOUS at 10:10

## 2023-02-16 RX ADMIN — BACITRACIN ZINC: 500 OINTMENT TOPICAL at 10:11

## 2023-02-16 RX ADMIN — INSULIN LISPRO 8 UNITS: 100 INJECTION, SOLUTION INTRAVENOUS; SUBCUTANEOUS at 12:23

## 2023-02-16 RX ADMIN — ACETAMINOPHEN 650 MG: 325 TABLET ORAL at 12:21

## 2023-02-16 RX ADMIN — AMLODIPINE BESYLATE 5 MG: 5 TABLET ORAL at 09:57

## 2023-02-16 RX ADMIN — ESCITALOPRAM OXALATE 10 MG: 10 TABLET, FILM COATED ORAL at 09:56

## 2023-02-16 RX ADMIN — CEFAZOLIN 2000 MG: 2 INJECTION, POWDER, FOR SOLUTION INTRAMUSCULAR; INTRAVENOUS at 09:57

## 2023-02-16 RX ADMIN — PIOGLITAZONE 45 MG: 15 TABLET ORAL at 09:57

## 2023-02-16 RX ADMIN — ACETAMINOPHEN 650 MG: 325 TABLET ORAL at 06:26

## 2023-02-16 ASSESSMENT — PAIN DESCRIPTION - FREQUENCY
FREQUENCY: INTERMITTENT
FREQUENCY: INTERMITTENT

## 2023-02-16 ASSESSMENT — PAIN DESCRIPTION - DESCRIPTORS
DESCRIPTORS: ACHING;SORE
DESCRIPTORS: THROBBING
DESCRIPTORS: ACHING;SORE;NAGGING

## 2023-02-16 ASSESSMENT — PAIN SCALES - GENERAL
PAINLEVEL_OUTOF10: 5
PAINLEVEL_OUTOF10: 6
PAINLEVEL_OUTOF10: 4

## 2023-02-16 ASSESSMENT — PAIN DESCRIPTION - ONSET
ONSET: AWAKENED FROM SLEEP
ONSET: GRADUAL

## 2023-02-16 ASSESSMENT — PAIN DESCRIPTION - ORIENTATION
ORIENTATION: RIGHT

## 2023-02-16 ASSESSMENT — PAIN DESCRIPTION - PAIN TYPE
TYPE: SURGICAL PAIN
TYPE: SURGICAL PAIN

## 2023-02-16 ASSESSMENT — PAIN DESCRIPTION - LOCATION
LOCATION: ARM;ELBOW

## 2023-02-16 NOTE — PROGRESS NOTES
PCP is Estephanie Rosales MD  Office notified of admission.       Electronically signed by Stephani Reynaga RN MSN APRN-NP [facility] NP  [facility] CCRN 2/16/2023 4:39 PM

## 2023-02-16 NOTE — PROGRESS NOTES
Occupational Therapy  OT BEDSIDE TREATMENT NOTE   [address] [facility] [address] [facility]  [address] [place] [address]:  Patient Name: Bailey Mitchell  MRN: [id number]  : 1945  Room: [facility]     Evaluating OT: DOROTHEA Dubon, OTR/L  # [id number]     Referring Provider:  Poncho Pierre MD  Specific Provider Orders:  \"OT Eval and Treat\"  23     Diagnosis: Trauma [T14.90XA]  Subdural hematoma [S06. 5XAA]  Closed supracondylar fracture of right humerus, initial encounter [S42.411A]     Pt was admitted after falling down steps. Pertinent Medical History:  Pt has a past medical history of Arthritis, Carpal tunnel syndrome, Chronic back pain, Corns and callosities, Diabetes mellitus ([address] Fibromyositis, Hyperlipidemia, Hypertension, Onychomycosis, Osteoarthritis, Osteoporosis, Peripheral vascular disease ([address] Sciatica, and Type 2 diabetes mellitus without complication ([address] ,  has a past surgical history that includes back surgery; Hysterectomy; Tonsillectomy; back surgery; Breast biopsy (Right); Abdomen surgery; Carpal tunnel release (2019); shoulder surgery; and Spinal fusion.      Surgeries this admission: Tentatively scheduled for surgical repair of Right distal humerus fracture on 2-15-23     Precautions:  Fall Risk  NWB R UE, sling     Assessment of current deficits   [x] Functional mobility             [x]ADLs           [x] Strength                  [x]Cognition   [x] Functional transfers           [x] IADLs         [x] Safety Awareness   [x]Endurance   [] Fine Coordination              [x] Balance     [] Vision/perception    []Sensation     []Gross Motor Coordination  [x] ROM           [] Delirium                  [] Motor Control         OT PLAN OF CARE   OT POC based on physician orders, patient diagnosis and results of clinical assessment     Frequency/Duration 1-3 days/wk for 2 weeks PRN   Specific OT Treatment to include:      * Instruction/training on adapted ADL techniques and AE recommendations to increase functional independence within precautions       * Training on energy conservation strategies, correct breathing pattern and techniques to improve independence/tolerance for self-care routine  * Functional transfer/mobility training/DME recommendations for increased independence, safety, and fall prevention  * Patient/Family education to increase follow through with safety techniques and functional independence  * Recommendation of environmental modifications for increased safety with functional transfers/mobility and ADLs  * Cognitive retraining/development of therapeutic activities to improve problem solving, judgement, memory, and attention for increased safety/participation in ADL/IADL tasks  * Therapeutic exercise to improve motor endurance, ROM, and functional strength for ADLs/functional transfers  * Therapeutic activities to facilitate/challenge dynamic balance, stand tolerance for increased safety and independence with ADLs  * Therapeutic activities to facilitate gross/fine motor skills for increased independence with ADLs  * Neuro-muscular re-education: facilitation of righting/equilibrium reactions  * Positioning to improve skin integrity, interaction with environment and functional independence  * Delirium prevention/treatment  * Manual techniques for edema management  Other:     Modified Elle Scale (MRS)  Score     Description  0             No symptoms  1             No significant disability despite symptoms  2             Slight disability; able to look after own affairs  3             Moderate disability; able to ambulate without assist/ requires assist with ADLs  4             Moderate/Severe disability;requires assist to ambulate/assist with ADLs  5             Severe disability;bedridden/incontinent   6               Score:   3     Recommended Adaptive Equipment: TBD as pt progresses         Home Living: Pt lives alone in a [place] house. Bedroom on rhe 2nd floor, Full bath on 1st and 2nd floor.  (+) Basement. Bathroom setup:  Walk-in-Shower on 1st floor, Tub-Shower on 2nd floor - Pt prefers walk-in shower,  [address] [employer]   Equipment owned:  [address], [employer], [facility], Shower Chair, [address] [facility], [place], [facility], [facility] [employer]     Available Family Assist:  S.O. can assist PRN     Prior Level of Function:  Pt reported being IND w/ all ADLs, IADLs, Transfers and Mobility using No AD for ambulation. Driving:  Yes - shopping, outings, errands  Occupation:  None reported     Pain Level: Mod RUE pain, educated on positioning     Cognition: A & O x 4   Able to Follow Multi-Step Commands w/ Min VCs              Memory:  good (-)              Sequencing:  good (-)              Problem solving:  good (-)              Judgement/safety:  good (-)  Additional Comments:  Pt was pleasant and cooperative. Fair insight      Vitals/Lab Values:  WFL Room air                Functional Assessment:  AM-PAC Daily Activity Raw Score: 19/24       Initial Eval Status  Date: 2-13-23    Treatment Status  Date: 2/16/23 STGs = LTGs  Time frame: 10-14 days   Feeding SUP/Set up        Ind Mod I   Grooming Min A/Set up     Min A for hair-care   Able to wash hands/face/complete oral care standing at sink     Min A  To wash face, apply deodorant, and brush teeth standing at sink. Pt issued AE to assist with donning deodorant.  Mod I  Standing at the [address] [place] [address] A/Set up     Donning/doffing gown standing  Pt ed for adaptive tech    Min A  To don/doff gown and sling seated  Mod I      LB Dressing Mod A/Set up     Donning/doffing socks, undergarments, pants seated/standing  Pt ed for safe/adaptive techs, use of adaptive equip     Min A  To don/doff socks, underwear, and pants seated and standing to pull over hips Mod I      Bathing Mod A/Set up     Sponge-bathing seated/standing      Pt ed re: Benefits of use of Shower chair/Tub Bench, resources for obtaining equip; adaptive techs     Min A  Seated and standing for posterior Mod I       Toileting Min A/Set up     Hygiene - seated  Min A to adjust clothing over hips    Min A- clothing management  SBA- hygiene  Mod I      Bed Mobility  Supine to sit: NT   Sit to supine:  NT     Sup- supine<->sit Supine to sit: IND  Sit to supine: IND      Functional Transfers SUP     EOB, Chair, Commode  Multiple trials  Pt ed for safety/hand placement     Sup- sit<->stand Mod I      Functional Mobility SUP     Household distances in room, bathroom w/o AD  Pt ed for safety/improved safety awareness     Sup  To and from bathroom no AD Mod I      Balance Sitting:     Static:  Remote SUP    Dynamic:  SUP w/ functional ax EOB/Commode      Standing:     Static:  SUP    Dynamic:  SUP w/ functional ax/mobility w/o AD    Sitting:     Static:  Ind    Dynamic:  Ind     Standing:     Static:  Ind    Dynamic:  SUP  Sitting:     Static:  IND    Dynamic:  IND w/ functional ax     Standing:     Static:  IND w/ AD PRN    Dynamic:  IND w/ functional ax/mobility w/ AD PRN   Activity Tolerance Fair(+)          Good-  Good   Visual/  Perceptual     Hearing: WNL   Glasses: Reading     WFL   Hearing Aids:  no                         Comments: Upon arrival pt supine in bed. Pt educated on adaptive techniques to increase independence and safety during ADL's, bed mobility, and functional transfers. Discussed home set up with pt, giving suggestions to increase safety at discharge. At end of session pt left seated in bedside chair, call light within reach. Pt has made fair progress towards set goals.      Continue with current plan of care    Treatment Time In: 9:00            Treatment Time Out: 9:58             Treatment Charges: Mins Units   Ther Ex  [id number]     Manual Therapy [address] [facility]     Thera Activities 36984 10 1   ADL/Home Mgt 35034 47 3   Neuro Re-ed 20509     Group Therapy      Orthotic manage/training  81200     Non-Billable Time     Total Timed [place] [id number] [address] [place] [address]

## 2023-02-16 NOTE — PROGRESS NOTES
Physical Therapy    Date: 2023       Patient Name: Lizeth Henry  : 1945      MRN: [id number]    PT not completed this date. Patient Ind in hallway for mobility. No LOB noted. No equipment needs from PT perspective. Patient preparing for D/C.    Andrei Walker, PT

## 2023-02-16 NOTE — PROGRESS NOTES
TRAUMA SURGERY  DAILY PROGRESS NOTE  2/16/2023    CHIEF COMPLAINT:  Chief Complaint   Patient presents with    Consultation     Transfer from [place], fall down steps, right elbow fx, SDH       SUBJECTIVE:  No acute events overnight. \"Too loopy with percocet\" requesting home tramadol  Tolerating diet. OBJECTIVE:  /76   Pulse 76   Temp 97 °F (36.1 °C) (Temporal)   Resp 15   Wt 134 lb (60.8 kg)   SpO2 97%   BMI 24.51 kg/m²     GENERAL:  NAD. A&Ox3. LUNGS:  No increased work of breathing. CARDIOVASCULAR: RR  ABDOMEN:  Soft, non-distended, non-tender. No guarding, rigidity, rebound.   EXTREMITIES: RUE in sling, wiggles fingers  NEURO: GCS 15    ASSESSMENT/PLAN:  78 y.o. female s/p mechanical fall with R subdural hematoma, stable on repeat CT, and R humerus fracture s/p ORIF 2/15    Neuro exam without deficts, stable SDH  Neurosurgery following -- Keppra 500mg bid for 7 days  Ortho following for humerus fx s/p ORIF 2/15  Hx of diabetes -- monitor BG, carb control diet after OR  Multimodal pain control, Ambulate, SMI  PTOT -- AMPAC 18/24  DVT PPx post-op  Possible dc today when ok with ortho    Kaylin Mao DO  Surgery Resident PGY-5  2/16/2023  5:35 AM

## 2023-02-16 NOTE — CARE COORDINATION
2/16/2023 social work transition of care planning  Pt plan is to return home,[facility] has accepted. Will need [facility] orders in [facility] prior to discharge. Family to transport at discharge.   Electronically signed by COLE Bhat on 2/16/2023 at 8:11 AM

## 2023-02-16 NOTE — PROGRESS NOTES
Department of Orthopedic Surgery  Progress Note    Patient seen and examined. Brachial plexus block still active. Pain controlled. Expressed desire to go home today. No new complaints. Denies chest pain, shortness of breath, dizziness/lightheadedness. VITALS:  /76   Pulse 76   Temp 97 °F (36.1 °C) (Temporal)   Resp 15   Wt 134 lb (60.8 kg)   SpO2 97%   BMI 24.51 kg/m²     General: alert and oriented to person, place and time, well-developed and well-nourished, in no acute distress    MUSCULOSKELETAL:   right upper extremity:  Dressing C/D/I, splint in place, arm in sling  Patient able to flex and extend at MCP in all digits  Cap refill <2 sec  Subjective sensation to superficial axillary and radial/ulnar/median nerve distributions to hand    CBC:   Lab Results   Component Value Date/Time    WBC 4.9 02/15/2023 05:00 AM    HGB 9.7 02/15/2023 05:00 AM    HCT 29.6 02/15/2023 05:00 AM     02/15/2023 05:00 AM     PT/INR:    Lab Results   Component Value Date/Time    PROTIME 10.9 02/12/2023 10:25 AM    INR 1.00 02/12/2023 10:25 AM       ASSESSMENT  S/P right distal humerus ORIF    PLAN      Continue physical therapy and protocol: NWB -right UE  24 hour abx coverage to be completed today  Deep venous thrombosis prophylaxis -per primary, early mobilization-okay to resume on post day 1  Maintain splint in place. Splint and dressing will be removed 2 weeks postop for reassessment. Pain Control: IV and PO-monitor pain as peripheral block weans off  Monitor H&H  D/C Plan: Per primary team recommendation. Patient okay to discharge from orthopedic standpoint. We will follow-up in 2 weeks for postop visit in clinic  Discuss with attending      Electronically signed by Randal Soto DOon 2/16/2023 at 5:43 AM     Orthopaedic Attending    I have seen and evaluated the patient with the resident and agree with the above assessments on today's visit.  I have performed the key components of the history and physical examination and concur completely with the findings and plans as documented above. Patient's pain well controlled. No gross motor or sensory deficits to operative extremity. Hand warm and perfused with palpable radial pulse.   Continuous splint, sling, nonweightbearing affected extremity  Discharge planning    Electronically signed by   Bety Reyna DO  2/16/2023

## 2023-02-16 NOTE — PLAN OF CARE
Problem: Discharge Planning  Goal: Discharge to home or other facility with appropriate resources  Outcome: Progressing  Flowsheets (Taken 2/15/2023 1625 by Thelma White RN)  Discharge to home or other facility with appropriate resources: Identify barriers to discharge with patient and caregiver     Problem: Pain  Goal: Verbalizes/displays adequate comfort level or baseline comfort level  Outcome: Progressing     Problem: Safety - Adult  Goal: Free from fall injury  Outcome: Progressing     Problem: Chronic Conditions and Co-morbidities  Goal: Patient's chronic conditions and co-morbidity symptoms are monitored and maintained or improved  Outcome: Progressing     Problem: Neurosensory - Adult  Goal: Achieves stable or improved neurological status  Outcome: Progressing  Goal: Absence of seizures  Outcome: Progressing  Goal: Achieves maximal functionality and self care  Outcome: Progressing     Problem: Skin/Tissue Integrity - Adult  Goal: Incisions, wounds, or drain sites healing without S/S of infection  Outcome: Progressing     Problem: Musculoskeletal - Adult  Goal: Return mobility to safest level of function  Outcome: Progressing  Goal: Maintain proper alignment of affected body part  Outcome: Progressing  Goal: Return ADL status to a safe level of function  Outcome: Progressing

## 2023-02-16 NOTE — DISCHARGE SUMMARY
Physician Discharge Summary     Patient ID:  Nicci Taylor  [id number]  14 y.o.  1945    Admit date: 2/12/2023    Discharge date and time: No discharge date for patient encounter. Admitting Physician: Archie Puente MD     Admission Diagnoses: Trauma [T14.90XA]  Subdural hematoma [S06. 5XAA]  Closed supracondylar fracture of right humerus, initial encounter [S42.411A]    Discharge Diagnoses: Principal Problem:    Trauma  Active Problems:    Subdural hematoma    Closed fracture of distal end of right humerus with routine healing  Resolved Problems:    * No resolved hospital problems. *      Admission Condition: good    Discharged Condition: stable    Indication for Admission: transfer, fall, trauma consult     Hospital Course/Procedures/Operation/treatments:   2/12:Trauma consult. Injury occurred just prior to arrival. Patient had mechanical fall over her dog, hit her head, no LOC. Denies n/v or visual changes. At [place] scans showed B/L SDH and R humeral nondisplaced fracture. Pain in her arm is well controlled, splinted and in sling. Denies weakness, numbness, paraesthesias  2/13:Pt only complains of right arm pain, no other concerns at this time. 2/14:Sore but pain controlled with Rx. States may get surgery with Ortho tomorrow and requests it. Ambulating to bathroom, tolerating diet. BG have been elevated   2/15: No acute events overnight. Pain controlled. 2/16:No acute events overnight. \"Too loopy with percocet\" requesting home tramadol  Tolerating diet.  Okay to Dc            Consults:   IP CONSULT TO ORTHOPEDIC SURGERY  IP CONSULT TO TRAUMA SURGERY  IP CONSULT TO NEUROSURGERY  TEST MOCK CON71    Significant Diagnostic Studies:   XR ELBOW RIGHT (MIN 3 VIEWS)    Result Date: 2/12/2023                                      [address], [address] [place] [address] [phone]                                                XRay Report                                                  Signed    [facility]                                                                MR#: Peggy Hamm          : 1945                                                                [de-identified]            Age/Sex: 78 / F                                                                Admit Date: 23            Loc: ER                                                                                     Attending Dr:     Romana Elena Physician: Jenaro Montero MD   Date of Service: 23  Procedure(s): XR elbow RT min 3V  Accession Number(s): [id number]    cc: Daniel Demarco MD      INDICATION:  Right elbow pain post fall. TECHNIQUE:  Four views of the right elbow. COMPARISON:  None available. FINDINGS:        Supracondylar fracture with mild displacement. No findings of an intracondylar   extension. No dislocation identified. RIGHT elbow joint effusion. IMPRESSION:      Supracondylar RIGHT humeral fracture. No dislocation identified. RIGHT elbow joint effusion.          Signed by Trung Hurtado MD           Dictated By: Brenda Tobar MD DD/DT: 23 0820               Signed By: Brenda Tobar MD 23 09          :    XR KNEE RIGHT (3 VIEWS)    Result Date: 2023                                      [address]                                             [facility], [address] [place], [place] [address]y                                              [phone]                                                XRay Report                                                  Signed    [facility]                                                                MR#: [id number]  Carolina Carlin          : 1945 [de-identified]            Age/Sex: 78 / F                                                                Admit Date: 02/12/23            Loc: ER                                                                                     Attending Dr:     Ordering Physician: Kylah Joe MD   Date of Service: 02/12/23  Procedure(s): XR knee RT 3V  Accession Number(s): [id number]    cc: Daniel Demarco MD      INDICATION:  Right knee pain post fall. TECHNIQUE:  Three views of the right knee. COMPARISON:  None available. FINDINGS:     There is no displaced fracture. The alignment is anatomic. No soft tissue   abnormality is seen. There is no joint effusion. IMPRESSION:      1. No acute fracture of the right knee. .         Signed by Cirilo Mckeon MD           Dictated By: Dante Pearce MDDD/DT: 02/12/23 0855               Signed By: Dante Pearce MD 02/12/23 0952         :    CT HEAD WO CONTRAST    Result Date: 2/12/2023  EXAMINATION: CT OF THE HEAD WITHOUT CONTRAST  2/12/2023 10:49 am TECHNIQUE: CT of the head was performed without the administration of intravenous contrast. Automated exposure control, iterative reconstruction, and/or weight based adjustment of the mA/kV was utilized to reduce the radiation dose to as low as reasonably achievable. COMPARISON: CT face same date at 842 hours HISTORY: ORDERING SYSTEM PROVIDED HISTORY: SDH TECHNOLOGIST PROVIDED HISTORY: Reason for exam:->SDH Has a \"code stroke\" or \"stroke alert\" been called? ->No What reading provider will be dictating this exam?->CRC FINDINGS: Parenchyma: Stable right frontotemporal subdural hematoma, measuring up to 4 mm on coronal images (series 4, image 41). No mass effect. Mild chronic microvascular ischemic changes. No new intracranial hemorrhage. Ventricles: No evidence of hydrocephalus. Calvarium: No acute calvarial fracture is seen.  Right frontal/periorbital scalp hematoma. Subcentimeter osteoma of the outer table at the right occipitomastoid suture. Stable right subdural hematoma. No mass effect. No new intracranial hemorrhage. CT HEAD WO CONTRAST    Result Date: 2023                                      [address] [place] [address]                                              [phone]                                               CT Scan Report                                                  Signed    Flores Mishra [facility]                                                                MR#: [id number]  [id number]          : 1945                                                                [de-identified]            Age/Sex: 78 / F                                                                Admit Date: 23            Loc: ER                                                                                     Attending Dr:     Romana Elena Physician: Jenaro Montero MD   Date of Service: 23  Procedure(s): CT head/brain wo con  Accession Number(s): [id number]    cc: Jenaro Montero MD      INDICATION:  Kana Cocker down stairs, abrasions and bruising to right face. TECHNIQUE:  CT of the brain and facial bones was performed without contrast.        Automated mA/kV exposure control was utilized and patient examination was performed   in strict accordance with principles of ALARA. RADIATION AMOUNT:  834 mGy-cm. COMPARISON:  None available. FINDINGS:       There is a RIGHT frontal superficial soft tissue hematoma. No underlying fracture   is identified. There is a RIGHT subdural hematoma with a maximum depth of   approximately 4 mm. There is a RIGHT parafalcine subdural hematoma and LEFT   tentorial subdural hematoma each with a maximum depth of approximately 2 mm.   No   associated mass effect with these findings. Chronic small vessel changes and   generalized volume loss. Paranasal sinuses, middle ears and mastoid air cells are   clear. No acute orbital abnormality. No facial fracture identified. No acute orbital abnormality is identified. The   cribriform plate and lamina papyracea are intact. The paranasal sinuses are clear. No mandibular fracture or dislocation. No acute abnormality the temporomandibular   joints. The hard palate and pterygoid plates are intact. The bony skull base is of   normal appearance. Visualized portions of the middle ears and mastoid air cells are   clear. The partially visualized soft tissues of the skull base of normal   appearance. IMPRESSION:      There is a RIGHT hemispheric subdural hematoma with a maximum depth of   approximately 4 mm. There is a RIGHT parafalcine subdural hematoma and LEFT   tentorial subdural hematoma each with a maximum depth of approximately 2 mm. No   associated mass effect with these findings. Chronic small vessel changes and generalized volume loss. There is a RIGHT frontal superficial soft tissue hematoma. No underlying fracture   is identified. No findings of facial fracture.          Signed by Victor Hugo Waggoner MD           Dictated By: Marbella Johnson MD DD/DT: 23 0906               Signed By: Marbella Johnson MD 23 0951          :    CT FACIAL BONES WO CONTRAST    Result Date: 2023                                      [address]                                              [address]                                              [phone]                                               CT Scan Report                                                  Signed    Brigitte Moran [facility]                                                                MR#: [id number]  [id number]          : 1945 [de-identified]            Age/Sex: 78 / F                                                                Admit Date: 02/12/23            Loc: ER                                                                                     Attending Dr:     Ordering Physician: Natali Pool MD   Date of Service: 02/12/23  Procedure(s): CT facial bones wo con  Accession Number(s): [id number]    cc: Daniel Demarco MD      INDICATION:  Cornelio Alejandro down stairs, abrasions and bruising to right face. TECHNIQUE:  CT of the brain and facial bones was performed without contrast.        Automated mA/kV exposure control was utilized and patient examination was performed   in strict accordance with principles of ALARA. RADIATION AMOUNT:  834 mGy-cm. COMPARISON:  None available. FINDINGS:       There is a RIGHT frontal superficial soft tissue hematoma. No underlying fracture   is identified. There is a RIGHT subdural hematoma with a maximum depth of   approximately 4 mm. There is a RIGHT parafalcine subdural hematoma and LEFT   tentorial subdural hematoma each with a maximum depth of approximately 2 mm. No   associated mass effect with these findings. Chronic small vessel changes and   generalized volume loss. Paranasal sinuses, middle ears and mastoid air cells are   clear. No acute orbital abnormality. No facial fracture identified. No acute orbital abnormality is identified. The   cribriform plate and lamina papyracea are intact. The paranasal sinuses are clear. No mandibular fracture or dislocation. No acute abnormality the temporomandibular   joints. The hard palate and pterygoid plates are intact. The bony skull base is of   normal appearance. Visualized portions of the middle ears and mastoid air cells are   clear. The partially visualized soft tissues of the skull base of normal   appearance.       IMPRESSION:      There is a RIGHT hemispheric subdural hematoma with a maximum depth of   approximately 4 mm. There is a RIGHT parafalcine subdural hematoma and LEFT   tentorial subdural hematoma each with a maximum depth of approximately 2 mm. No   associated mass effect with these findings. Chronic small vessel changes and generalized volume loss. There is a RIGHT frontal superficial soft tissue hematoma. No underlying fracture   is identified. No findings of facial fracture. Signed by Victor Hugo Waggoner MD           Dictated By: Marbella Johnson MD DD/DT: 23               Signed By: Marbella Johnson MD 23 0951          :    CT CERVICAL SPINE WO CONTRAST    Result Date: 2023                                      [address]                                             [place], [address] [place], [place] [address]y                                              [phone]                                               CT Scan Report                                                  Signed    Brigitte [facility]                                                                MR#: Vonzell Hayden          : 1945                                                                [de-identified]            Age/Sex: 78 / F                                                                Admit Date: 23            Loc: ER                                                                                     Attending Dr:     Herminia Melo Physician: Bennie Mcgill MD   Date of Service: 23  Procedure(s): CT cervical spine wo con  Accession Number(s): [id number]    cc: Bennie Mcgill MD      INDICATION:  Lum Brochure down stairs, abrasions and bruising to right face. TECHNIQUE:  CT of the cervical spine was performed without intravenous or   intrathecal contrast.  Sagittal and coronal reconstructions were generated.         Automated mA/kV exposure control was utilized and patient examination was performed   in strict accordance with principles of ALARA. RADIATION AMOUNT:  492 mGy-cm. COMPARISON:  None available. FINDINGS:       Normal alignment of the cervical vertebral bodies. The vertebral bodies are of   normal height. There is loss of intervertebral disc space height endplate   sclerosis and marginal osteophyte formation C5-C6, C6-C7 and C7-T1. Mild cervical   facet arthrosis. Degenerative changes of the C1-C2 articulation and craniocervical   junction otherwise of normal relationship. Prevertebral soft tissues of normal   appearance. Partially visualized nodular enlarged thyroid. Examination through the cranial cervical junction showing it to widely patent. Examination through the C2-C3 intervertebral level revealing facet arthrosis. Mild   uncovertebral degenerative changes. No significant central stenosis. No   significant foraminal narrowing. Examination through the C3-C4 intervertebral level revealing mild uncovertebral   degenerative changes. Small posterior disc osteophyte. No significant central   stenosis or foraminal narrowing. Examination through the C4-C5 intervertebral level revealing facet arthrosis. Uncovertebral degenerative changes. Posterior disc osteophyte. Effacement of the   CSF. Associated cord flattening. Central AP canal diameter measuring   approximately 6 mm. Moderate to severe RIGHT foraminal narrowing. Moderate LEFT   foraminal narrowing. Examination through the C5-C6 intervertebral level revealing facet arthrosis with   uncovertebral degenerative changes and posterior disc osteophyte. There is a mild   associated central stenosis. Effacement of the CSF. Question mild impress on the   ventral cord. Central AP canal diameter measuring approximately 7 mm. Moderate   bilateral foraminal narrowing.       Examination through the C6-C7 intervertebral level revealing facet arthrosis with   uncovertebral degenerative changes with posterior disc osteophyte. Mild associated   central stenosis. No significant RIGHT foraminal narrowing. Mild LEFT foraminal   narrowing. Examination through the C7-T1 intervertebral level revealing small posterior disc   osteophyte. No significant central stenosis. No significant foraminal narrowing. Impression:      Spondylotic changes and facet arthrosis. No findings of fracture or listhesis. Posterior disc osteophyte at C4-C5 resulting in effacement of the CSF and   associated cord flattening. This is of a chronic appearance. Central AP canal   diameter measuring approximately 6 mm. Moderate to severe RIGHT foraminal   narrowing. Moderate LEFT foraminal narrowing. C5-C6 Effacement of the CSF. Question mild impress on the ventral cord. Central AP   canal diameter measuring approximately 7 mm. Moderate bilateral foraminal   narrowing. C6-C7 Mild central stenosis. No significant RIGHT foraminal narrowing. Mild LEFT   foraminal narrowing.          Signed by Robyn Spaulding MD           Dictated By: Miles Mcneal MD DD/DT: 23 1033               Signed By: Miles Mcneal MD 23 1049          :    XR CHEST PORTABLE    Result Date: 2023                                      [address]                                             [facility], [address] [place] [address]                                              [phone]                                                XRay Report                                                  Signed    [facility]                                                                [facility] Gayathri Hicks          : 1945                                                                [de-identified]            Age/Sex: 78 / F Admit Date: 02/12/23            Loc: ER                                                                                     Attending Dr:     Ordering Physician: Ruddy Mejia MD   Date of Service: 02/12/23  Procedure(s): XR chest 1V portable  Accession Number(s): [id number]    cc: Daniel Demarco MD      INDICATION:  Pain post fall. TECHNIQUE:  Frontal chest was obtained at 1011 hours. COMPARISON:  None available      FINDINGS:     The cardiomediastinal silhouette is normal in size. There is no consolidation or   atelectasis in either lung. There are no pleural effusions. There is no   pneumothorax. No acute osseous process. IMPRESSION:   No acute cardiopulmonary disease. .           Signed by Michael Syed MD           Dictated By: Lisette Mosqueda MD        DD/DT: 02/12/23 1015               Signed By: Lisette Mosqueda MD 02/12/23 1054                 :    CT 3D RECONSTRUCTION    Result Date: 2/12/2023  EXAMINATION: CT OF THE RIGHT ELBOW WITHOUT CONTRAST; 3D RECONSTRUCTIONS 2/12/2023 5:42 pm TECHNIQUE: CT of the right elbow was performed without the administration of intravenous contrast.  Multiplanar reformatted images are provided for review. Automated exposure control, iterative reconstruction, and/or weight based adjustment of the mA/kV was utilized to reduce the radiation dose to as low as reasonably achievable. ; 3D reconstructions were performed on a separate workstation. Automated exposure control, iterative reconstruction, and/or weight based adjustment of the mA/kV was utilized to reduce the radiation dose to as low as reasonably achievable.  COMPARISON: Elbow series from the same day at 07:57 HISTORY ORDERING SYSTEM PROVIDED HISTORY: supracondylar humerus fracture TECHNOLOGIST PROVIDED HISTORY: Reason for exam:->supracondylar humerus fracture What reading provider will be dictating this exam?->CRC FINDINGS: Bones: Transversely oriented supracondylar fracture of the distal right humerus. Slight anterior displacement of the medial aspect of the fracture and slight posterior displacement of the lateral aspect. The distal humeral articulation with the ulna and the radiocapitellar articulations appear maintained. Radial head and radial head/neck region appears intact. The imaged ulna appears intact. Soft Tissue: Soft tissue stranding and edema about the right elbow joint. There also appears to be muscular edema. Joint: Mild degenerative spurring about the right elbow joint. Overall the right elbow appears properly aligned. There is a joint effusion. 3D volumetric reformats were also acquired for subspecialty services. 1.  There is a supracondylar fracture of the distal right humerus. The fracture has a predominantly transverse orientation with slight displacement. 2.  The distal humeral and ulnar articulation and the radiocapitellar articulations appear maintained on these images. 3.  There is soft tissue stranding and edema about the right elbow joint. There is a joint effusion. CT ELBOW RIGHT WO CONTRAST    Result Date: 2/12/2023  EXAMINATION: CT OF THE RIGHT ELBOW WITHOUT CONTRAST; 3D RECONSTRUCTIONS 2/12/2023 5:42 pm TECHNIQUE: CT of the right elbow was performed without the administration of intravenous contrast.  Multiplanar reformatted images are provided for review. Automated exposure control, iterative reconstruction, and/or weight based adjustment of the mA/kV was utilized to reduce the radiation dose to as low as reasonably achievable. ; 3D reconstructions were performed on a separate workstation. Automated exposure control, iterative reconstruction, and/or weight based adjustment of the mA/kV was utilized to reduce the radiation dose to as low as reasonably achievable.  COMPARISON: Elbow series from the same day at 07:57 HISTORY ORDERING SYSTEM PROVIDED HISTORY: supracondylar humerus fracture TECHNOLOGIST PROVIDED HISTORY: Reason for exam:->supracondylar humerus fracture What reading provider will be dictating this exam?->CRC FINDINGS: Bones: Transversely oriented supracondylar fracture of the distal right humerus. Slight anterior displacement of the medial aspect of the fracture and slight posterior displacement of the lateral aspect. The distal humeral articulation with the ulna and the radiocapitellar articulations appear maintained. Radial head and radial head/neck region appears intact. The imaged ulna appears intact. Soft Tissue: Soft tissue stranding and edema about the right elbow joint. There also appears to be muscular edema. Joint: Mild degenerative spurring about the right elbow joint. Overall the right elbow appears properly aligned. There is a joint effusion. 3D volumetric reformats were also acquired for subspecialty services. 1.  There is a supracondylar fracture of the distal right humerus. The fracture has a predominantly transverse orientation with slight displacement. 2.  The distal humeral and ulnar articulation and the radiocapitellar articulations appear maintained on these images. 3.  There is soft tissue stranding and edema about the right elbow joint. There is a joint effusion.      XR HUMERUS LEFT (1 VIEW)    Result Date: 2023                                      [address], [address] [place] [address] [address]                                              [phone]                                                XRay Report                                                  Signed    [facility]                                                                MR#: [id number]          : 1945                                                                [de-identified]            Age/Sex: 78 / F                                                                Admit Date: 23            Loc: ER Attending Dr:     Ordering Physician: Rachel Haywood MD   Date of Service: 02/12/23  Procedure(s): XR humerus RT  Accession Number(s): [id number]    cc: Daniel Demarco MD      INDICATION:  Pain in right upper arm, and elbow, post fall. TECHNIQUE:  Two views of the right humerus. COMPARISON:  None Available. FINDINGS:        2 views of the RIGHT humerus showing supracondylar fracture with mild displacement. No findings of an intracondylar extension. No dislocation identified. Degenerative changes of the RIGHT shoulder. Chronic loss of acromiohumeral joint   space. IMPRESSION:      Supracondylar RIGHT humeral fracture. No dislocation identified. Signed by Migel Sanchez MD           Dictated By: Reji Douglas MD DD/DT: 02/12/23 0816               Signed By: Reji Douglas MD 02/12/23 0924          :      Discharge Exam:  OBJECTIVE:  /76   Pulse 76   Temp 97 °F (36.1 °C) (Temporal)   Resp 15   Wt 134 lb (60.8 kg)   SpO2 97%   BMI 24.51 kg/m²      GENERAL:  NAD. A&Ox3. LUNGS:  No increased work of breathing. CARDIOVASCULAR: RR  ABDOMEN:  Soft, non-distended, non-tender. No guarding, rigidity, rebound. EXTREMITIES: RUE in sling, wiggles fingers  NEURO: GCS 15    Disposition: home    In process/preliminary results:  Outstanding Order Results       No orders found from 1/14/2023 to 2/13/2023. Patient Instructions:   Current Discharge Medication List             Details   levETIRAcetam (KEPPRA) 500 MG tablet Take 1 tablet by mouth 2 times daily for 5 doses  Qty: 5 tablet, Refills: 0      oxyCODONE-acetaminophen (PERCOCET) 5-325 MG per tablet Take 1 tablet by mouth every 6 hours as needed for Pain for up to 7 days. Intended supply: 7 days.  Take lowest dose possible to manage pain Max Daily Amount: 4 tablets  Qty: 28 tablet, Refills: 0    Comments: Reduce doses taken as pain becomes manageable  Associated Diagnoses: Closed supracondylar fracture of right humerus, initial encounter                Details   amLODIPine (NORVASC) 5 MG tablet Take 1 tablet by mouth daily  Qty: 90 tablet, Refills: 1    Associated Diagnoses: Primary hypertension      escitalopram (LEXAPRO) 10 MG tablet Take 1 tablet by mouth daily  Qty: 30 tablet, Refills: 5    Associated Diagnoses: Burnout of caregiver      glipiZIDE (GLUCOTROL) 5 MG tablet Take 2 tablets by mouth 2 times daily      pioglitazone (ACTOS) 45 MG tablet Take 45 mg by mouth daily. [facility] Department of Orthopedic Surgery  [address]    Dr. Delmer Gutierrez, DO         Dr. Beatrice Doss, MD Dr. Sharlyn Sandhoff, MD Bennet Robin, PA-C Marikay Hull PA-C Johnnette Chasten PA-C      Orthopaedics Discharge Instructions   Weight bearing Status - Non-weight bearing - on right upper Extremity  Pain medication Per Prescriptions  Contact Office for Medication Refill- [phone]  Office can refill pain med every 7 days  If patient discharging to facility then pain control will be continued per facility physician  Ice to operative/injured site for 15-30 minutes of each hour for next 5 days    Recommend that you continue to ice the area 2-3 times per day after this   Elevate operative/injured limb on 2 pillows at home  Goal is to have limb above the heart if able   Wound care - leave splint in place until follow up   Follow Up in Office in 2 weeks. Your first post op appointment is often with one of our PAs. Call the office at [phone] or directions or with any questions. Watch for these significant complications. Call physician if they or any other problems occur:  Fever over 101°, redness, swelling or warmth at the operative site  Unrelieved nausea    Foul smelling or cloudy drainage at the operative site   Unrelieved pain    Blood soaked dressing.  (Some oozing may be normal) Numb, pale, blue, cold or tingling extremity    Future Appointments   Date Time Provider Mariam Pricilla   2/27/2023  1:00 PM [facility] [place] ORTHO APC SE [facility]   3/16/2023  2:30 PM CIRA Sánchez [facility]   3/17/2023  8:00 AM Nino Santiago., DPM [place] [facility] [facility]         It is the Department of Orthopaedic Trauma's standard of practice that providers will de-escalate(wean) all patients from narcotic(opioid) medications during the post-operative period. We provide multimodal pain control but opioid medications are tapered in all of our patients. If patient requires referral to pain management for prolonged taper off of opioid pain medication we will facilitate this process. TRAUMA SERVICES DISCHARGE INSTRUCTIONS    Call [phone], option 2, for any questions/concerns and for follow-up appointment in 1 week(s). Please follow the instructions checked below:  Please follow-up with your primary care provider. ACTIVITY INSTRUCTIONS  Increase activity as tolerated  No heavy lifting or strenuous activity  Take your incentive spirometer home and use 4-6 times/day   [x]  No driving until cleared by Orthopedic surgery. WOUND/DRESSING INSTRUCTIONS:  You may shower. No sitting in bath tub, hot tub or swimming until cleared by physician. Ice to areas of pain for first 24 hours. Heat to areas of pain after that. Wash areas of lacerations/abrasions with soap & water. Rinse well. Pat dry with clean towel. Keep wounds clean and dry. MEDICATION INSTRUCTIONS  Take medication as prescribed. When taking pain medications, you may experience dizziness or drowsiness. Do not drink alcohol or drive when taking these medications. You may experience constipation while taking pain medication. You may take over the counter stool softeners such as docusate (Colace), sennosides S (Senokot-S), or Miralax. [x]  You may take acetaminophen (Tylenol) products.   Do NOT take more than 4000mg of Tylenol in 24h. OPIOID MEDICATION INSTRUCTIONS  Read the medication guide that is included with your prescription. Take your medication exactly as prescribed. Store medication away from children and in a safe place. Do NOT share your medication with others. Do NOT take medication unless it is prescribed for you. Do NOT drink alcohol while taking opioids (I.e., Norco, Percocet, Oxycodone, etc). Discuss with the Trauma Clinic staff if the dose of medication you are taking does not control your pain and any side effects that you may be having. CALL 911 OR YOUR LOCAL EMERGENCY SERVICE:  --If you take too much medication  --If you have trouble breathing or shortness of breath  --A child has taken this medication. WORK:  You may not return to work until you receive follow-up with the Trauma Clinic or clearance by all consultants. Call the trauma clinic for any of the following or for questions/concerns;  --fever over 101F  --redness, swelling, hardness or warmth at the wound site(s). --Unrelieved nausea/vomiting  --Foul smelling or cloudy drainage at the wound site(s)  --Unrelieved pain or increase in pain  --Increase in shortness of breath    Follow-up:  Trauma Clinic: [phone] option [address]    SPECIAL CONSIDERATIONS FOR OUR PATIENTS OVER THE AGE OF 65Y    Getting around your home safely can be a challenge if you have injuries or health problems that make it easy for you to fall. Loose rugs and furniture in walkways are among the dangers for many older people who have problems walking or who have poor eyesight. People who have conditions such as arthritis, osteoporosis, or dementia also must be careful not to fall. You can make your home safer with a few simple measures. Follow-up care is a key part of your treatment and safety.  Be sure to make and go to all appointments, and call your doctor or nurse call line if you are having problems. It's also a good idea to know your test results and keep a list of the medicines you take. How can you care for yourself at home? Taking care of yourself  You may get dizzy if you do not drink enough water. To prevent dehydration, drink plenty of fluids, enough so that your urine is light yellow or clear like water. Choose water and other caffeine-free clear liquids. If you have kidney, heart, or liver disease and have to limit fluids, talk with your doctor before you increase the amount of fluids you drink. Exercise regularly to improve your strength, muscle tone, and balance. Walk if you can. Swimming may be a good choice if you cannot walk easily. Have your vision and hearing checked each year or any time you notice a change. If you have trouble seeing and hearing, you might not be able to avoid objects and could lose your balance. Know the side effects of the medicines you take. Ask your doctor or pharmacist whether the medicines you take can affect your balance. Sleeping pills or sedatives can affect your balance. Limit the amount of alcohol you drink. Alcohol can impair your balance and other senses. Ask your doctor whether calluses or corns on your feet need to be removed. If you wear loose-fitting shoes because of calluses or corns, you can lose your balance and fall. Talk to your doctor if you have numbness in your feet. Preventing falls at home  Remove raised doorway thresholds, throw rugs, and clutter. Repair loose carpet or raised areas in the floor. Move furniture and electrical cords to keep them out of walking paths. Use non-skid floor wax, and wipe up spills right away, especially on ceramic tile floors. If you use a walker or cane, put rubber tips on it. If you use crutches, clean the bottoms of them regularly with an abrasive pad, such as steel wool. Keep your house well lit, especially stairways, porches, and outside walkways.  Use night-lights in areas such as hallways and washrooms. Add extra light switches or use remote switches (such as switches that go on or off when you clap your hands) to make it easier to turn lights on if you have to get up during the night. Install sturdy handrails on stairways. Move items in your cabinets so that the things you use a lot are on the lower shelves (about waist level). Keep a cordless phone and a flashlight with new batteries by your bed. If possible, put a phone in each of the main rooms of your house, or carry a cell phone in case you fall and cannot reach a phone. Or, you can wear a device around your neck or wrist. You push a button that sends a signal for help. Wear low-heeled shoes that fit well and give your feet good support. Use footwear with non-skid soles. Check the heels and soles of your shoes for wear. Repair or replace worn heels or soles. Do not wear socks without shoes on wood floors. Walk on the grass when the sidewalks are slippery. If you live in an area that gets snow and ice in the winter, sprinkle salt on slippery steps and sidewalks. Preventing falls in the bath  Install grab bars and non-skid mats inside and outside your shower or tub and near the toilet and sinks. Use shower chairs and bath benches. Use a hand-held shower head that will allow you to sit while showering. Get into a tub or shower by putting the weaker leg in first. Get out of a tub or shower with your strong side first.  Repair loose toilet seats and consider installing a raised toilet seat to make getting on and off the toilet easier. Keep your washroom door unlocked while you are in the shower. Follow-up:  Trauma Clinic: [phone] option 2  [address]    Call [phone], option 2, for any questions/concerns and for follow-up appointment in 2 week(s).     Please follow the instructions checked below:    During the course of your workup, we identified an incidental finding of:  N/A  Please follow-up with your primary care provider. ACTIVITY INSTRUCTIONS  Increase activity as tolerated  No heavy lifting or strenuous activity  Take your incentive spirometer home and use 4-6 times/day   []  No driving until cleared by Ortho/Trauma services    WOUND/DRESSING INSTRUCTIONS:  You may shower. No sitting in bath tub, hot tub or swimming until cleared by physician. Ice to areas of pain for first 24 hours. Heat to areas of pain after that. Wash areas of lacerations/abrasions with soap & water. Rinse well. Pat dry with clean towel. Apply thin layer of Bacitracin, Neosporin, or triple antibiotic cream to affected area 2-3 times per day. Keep wounds clean and dry. Vearl Pippins MEDICATION INSTRUCTIONS  Take medication as prescribed. When taking pain medications, you may experience dizziness or drowsiness. Do not drink alcohol or drive when taking these medications. You may experience constipation while taking pain medication. You may take over the counter stool softeners such as docusate (Colace), sennosides S (Senokot-S), or Miralax.   []  You may take Ibuprofen (over the counter) as directed for mild pain. --You may take up to 800mg every 8 hours for pain, please take with food or milk. []  Do not take any other acetaminophen (Tylenol) products if you are taking Percocet or Norco, as these contain Tylenol. --Do NOT take more than 4000mg of Tylenol in 24h. OPIOID MEDICATION INSTRUCTIONS  Read the medication guide that is included with your prescription. Take your medication exactly as prescribed. Store medication away from children and in a safe place. Do NOT share your medication with others. Do NOT take medication unless it is prescribed for you. Do NOT drink alcohol while taking opioids (I.e., Norco, Percocet, Oxycodone, etc).   Discuss with the Trauma Clinic staff if the dose of medication you are taking does not control your pain and any side effects that you may be having. CALL 911 OR YOUR LOCAL EMERGENCY SERVICE:  --If you take too much medication  --If you have trouble breathing or shortness of breath  --A child has taken this medication. WORK:  You may not return to work until you receive follow-up with the Trauma Clinic or clearance by all consultants. Call the trauma clinic for any of the following or for questions/concerns;  --fever over 101F  --redness, swelling, hardness or warmth at the wound site(s). --Unrelieved nausea/vomiting  --Foul smelling or cloudy drainage at the wound site(s)  --Unrelieved pain or increase in pain  --Increase in shortness of breath    Follow-up:  Trauma Clinic: [phone] option [address]  [place], [address] [place]                      Follow up:   Janice Wynne DO  [facility] [id number] [phone]    Schedule an appointment as soon as possible for a visit in 2 week(s)       [address]  [facility]  [address] [place] [address] Rodri Grady [facility] [phone]  Follow up in 1 week(s)  For follow up    Brenda Gar MD  [address] [place] [address].   \[facility] [place] [facility]\"" [id number] [phone]    Follow up in 3 week(s)  For follow up       Signed:  Carli Akins DO  2/16/2023  1:21 PM

## 2023-02-16 NOTE — TELEPHONE ENCOUNTER
Patient called to say she is in the hospital for a fall. Asked if we would contact the [facility] facility and cancel and appointment regarding an order from Dr. Renaldo Sales the facility and cancelled it. Patient states she is being discharged later today. normal...

## 2023-02-17 ENCOUNTER — CLINICAL DOCUMENTATION ONLY (OUTPATIENT)
Facility: CLINIC | Age: 78
End: 2023-02-17

## 2023-02-17 ENCOUNTER — TELEPHONE (OUTPATIENT)
Dept: FAMILY MEDICINE CLINIC | Age: 78
End: 2023-02-17

## 2023-02-17 DIAGNOSIS — Z73.0 BURNOUT OF CAREGIVER: ICD-10-CM

## 2023-02-17 DIAGNOSIS — I10 PRIMARY HYPERTENSION: ICD-10-CM

## 2023-02-17 NOTE — TELEPHONE ENCOUNTER
Los Snell was admitted to [facility] ([facility]) after breaking her arm in a fall. She was discharged to home today and needs a hospital follow up with in the next 7 days. There is no place to put her over the next 7 days. Any suggestions?

## 2023-02-18 NOTE — TELEPHONE ENCOUNTER
Spoke w/ Neyda White, the visiting nurse took all of her old medications out the home. She has been taking Percocet Prn for pain and 5 days of keppra. States that they were . Patient admits that she was not previously taking her medication as prescribed. She is willing to take the medication that she was on in the hospital. Refills are ready to be sent to [facility].      Hospital follow up scheduled w/ Dr Jorge Rothman 3/7/23

## 2023-02-18 NOTE — TELEPHONE ENCOUNTER
She is scheduled to see me on the 6th of March for her regular checkup and this is an orthopedic injury.  She can come in Monday if she needs to for a focused visit on elbow, or wait for her regular appointment

## 2023-02-20 RX ORDER — ESCITALOPRAM OXALATE 10 MG/1
10 TABLET ORAL DAILY
Qty: 30 TABLET | Refills: 0 | Status: SHIPPED
Start: 2023-02-20 | End: 2023-02-28

## 2023-02-20 RX ORDER — AMLODIPINE BESYLATE 5 MG/1
5 TABLET ORAL DAILY
Qty: 30 TABLET | Refills: 0 | Status: SHIPPED | OUTPATIENT
Start: 2023-02-20

## 2023-02-20 RX ORDER — PIOGLITAZONEHYDROCHLORIDE 45 MG/1
45 TABLET ORAL DAILY
Qty: 30 TABLET | Refills: 0 | Status: SHIPPED | OUTPATIENT
Start: 2023-02-20

## 2023-02-20 RX ORDER — GLIPIZIDE 5 MG/1
10 TABLET ORAL 2 TIMES DAILY
Qty: 60 TABLET | Refills: 0 | Status: SHIPPED | OUTPATIENT
Start: 2023-02-20

## 2023-02-28 ENCOUNTER — OFFICE VISIT (OUTPATIENT)
Dept: SURGERY | Age: 78
End: 2023-02-28
Payer: MEDICARE

## 2023-02-28 VITALS
DIASTOLIC BLOOD PRESSURE: 84 MMHG | WEIGHT: 134 LBS | OXYGEN SATURATION: 98 % | SYSTOLIC BLOOD PRESSURE: 160 MMHG | HEIGHT: 62 IN | BODY MASS INDEX: 24.66 KG/M2 | RESPIRATION RATE: 16 BRPM | HEART RATE: 78 BPM

## 2023-02-28 DIAGNOSIS — S06.5XAA SUBDURAL HEMATOMA: Primary | ICD-10-CM

## 2023-02-28 PROCEDURE — 1036F TOBACCO NON-USER: CPT | Performed by: NURSE PRACTITIONER

## 2023-02-28 PROCEDURE — G8420 CALC BMI NORM PARAMETERS: HCPCS | Performed by: NURSE PRACTITIONER

## 2023-02-28 PROCEDURE — 1090F PRES/ABSN URINE INCON ASSESS: CPT | Performed by: NURSE PRACTITIONER

## 2023-02-28 PROCEDURE — 1123F ACP DISCUSS/DSCN MKR DOCD: CPT | Performed by: NURSE PRACTITIONER

## 2023-02-28 PROCEDURE — 99213 OFFICE O/P EST LOW 20 MIN: CPT | Performed by: NURSE PRACTITIONER

## 2023-02-28 PROCEDURE — 99212 OFFICE O/P EST SF 10 MIN: CPT | Performed by: NURSE PRACTITIONER

## 2023-02-28 PROCEDURE — G8484 FLU IMMUNIZE NO ADMIN: HCPCS | Performed by: NURSE PRACTITIONER

## 2023-02-28 PROCEDURE — 1111F DSCHRG MED/CURRENT MED MERGE: CPT | Performed by: NURSE PRACTITIONER

## 2023-02-28 PROCEDURE — G8399 PT W/DXA RESULTS DOCUMENT: HCPCS | Performed by: NURSE PRACTITIONER

## 2023-02-28 PROCEDURE — 3079F DIAST BP 80-89 MM HG: CPT | Performed by: NURSE PRACTITIONER

## 2023-02-28 PROCEDURE — 3017F COLORECTAL CA SCREEN DOC REV: CPT | Performed by: NURSE PRACTITIONER

## 2023-02-28 PROCEDURE — 3077F SYST BP >= 140 MM HG: CPT | Performed by: NURSE PRACTITIONER

## 2023-02-28 PROCEDURE — G8427 DOCREV CUR MEDS BY ELIG CLIN: HCPCS | Performed by: NURSE PRACTITIONER

## 2023-02-28 RX ORDER — ACETAMINOPHEN 500 MG
500 TABLET ORAL EVERY 6 HOURS PRN
COMMUNITY

## 2023-02-28 ASSESSMENT — ENCOUNTER SYMPTOMS: SHORTNESS OF BREATH: 0

## 2023-02-28 NOTE — PROGRESS NOTES
[facility]  TRAUMA PROGRESS NOTE  TRAUMA ADVANCED NURSE PRACTITIONER    Chief Complaint   Patient presents with    Fall     Fall down steps, SDH, right humerus fx          S:  78 y. o.female clinic today for follow-up down several steps in which she suffered a humerus fracture and subdural hematoma. Since discharge patient states that she has been doing well, arm is currently in a sling. States she is doing well, is up and moving around, appetite is good, sleep is baseline. Get any HA, dizziness, worsening short term memory loss or nausea. She does state that she does get an occasional floater  to her left eye in which she has already scheduled an appointment with her eye doctor. Denies any vision changes       Family member/caregiver attended appointment:  Yes: Comment: Boyfriend      Have you had follow-up with your primary care physician since your trauma? No    Review of Systems   Constitutional:  Positive for activity change. Negative for appetite change. Respiratory:  Negative for shortness of breath. Neurological:  Negative for dizziness. ISAR:  identification of seniors at risk  Before you were injured, did you need someone to help you on a regular basis? No  Since the injury, have you needed more help than usual to take care of yourself? Yes  Have you been hospitalized for one or more nights during the past six months? No  In general, do you have problems seeing well? No  In general, do you have serious problems with your memory? No  Do you take more than 3 medications every day?   Yes    Concussion screen:  Headaches:    No  Balance issues: No  Concentration issues No  Memory issues No  Nausea/vomiting No  Sleeping issues No  Problems with vision No  Feeling like in a fog No        BP (!) 160/84   Pulse 78   Resp 16   Ht 5' 2\" (1.575 m)   Wt 134 lb (60.8 kg)   SpO2 98%   BMI 24.51 kg/m²   Physical Exam  Eyes:      Comments: Healing ecchymosis to right eye   Pulmonary:      Effort: Pulmonary effort is normal. No respiratory distress.      Breath sounds: Normal breath sounds. No stridor. No wheezing, rhonchi or rales.   Chest:      Chest wall: No tenderness.   Musculoskeletal:      Cervical back: Normal range of motion.   Skin:     General: Skin is warm and dry.   Neurological:      Mental Status: She is alert and oriented to person, place, and time.         ASSESSMENT/PLAN:  77 year old female follow up for a fall, no more falls since she has been home.  Doing well without complaints. Scans and injuries reviewed. Not taking anything for pain and she has her follow up visits scheduled.  She is doing very well and will follow up PRN      Total time: 25 minutes    BRITTANY Tineo CNP  2/28/2023  1:19 PM

## 2023-03-06 ENCOUNTER — OFFICE VISIT (OUTPATIENT)
Dept: ORTHOPEDIC SURGERY | Age: 78
End: 2023-03-06
Payer: MEDICARE

## 2023-03-06 ENCOUNTER — HOSPITAL ENCOUNTER (OUTPATIENT)
Dept: GENERAL RADIOLOGY | Age: 78
Discharge: HOME | End: 2023-03-08
Payer: MEDICARE

## 2023-03-06 DIAGNOSIS — T14.8XXA FRACTURE: Primary | ICD-10-CM

## 2023-03-06 DIAGNOSIS — S42.491D OTHER CLOSED DISPLACED FRACTURE OF DISTAL END OF RIGHT HUMERUS WITH ROUTINE HEALING, SUBSEQUENT ENCOUNTER: Primary | ICD-10-CM

## 2023-03-06 DIAGNOSIS — T14.8XXA FRACTURE: ICD-10-CM

## 2023-03-06 PROCEDURE — 99024 POSTOP FOLLOW-UP VISIT: CPT | Performed by: PHYSICIAN ASSISTANT

## 2023-03-06 PROCEDURE — L3702 EO W/O JOINTS CF: HCPCS

## 2023-03-06 PROCEDURE — 73080 X-RAY EXAM OF ELBOW: CPT

## 2023-03-06 PROCEDURE — 99213 OFFICE O/P EST LOW 20 MIN: CPT

## 2023-03-06 NOTE — PROGRESS NOTES
Chief Complaint   Patient presents with    Arm Pain     Right distal humerus orif; dos: 02/15/2023; patient fell at home while taking her puppy out to go potty; 1/10 pain; patient is right handed       OP:SURGEON: Dr. Sanket Plunkett DO  DATE OF PROCEDURE: 2/15/23  PROCEDURE:Open reduction total fixation right transcondylar distal humerus fracture       Subjective:  Susmha Eugene is approximately 3 weeks from the above surgery. She is nonweightbearing to the right upper extremity and has maintained splint and sling without any significant complications. Her pain is mild. She was receiving home health occupational therapy, but this has been paused. Denies calf pain, CP, SOB, fever, chills, malaise. Review of Systems -  all pertinent positives and negatives in HPI. Objective:    General: Alert and oriented X 3, normocephalic atraumatic, external ears and eye normal, sclera clear, no acute distress, respirations easy and unlabored with no audible wheezes, skin warm and dry, speech and dress appropriate for noted age, affect euthymic. Extremity:  Right Upper Extremity  Skin is clean dry and intact  Mild edema noted throughout the elbow  Arc of elbow AROM about   Radial pulse palpable, fingers warm with BCR  Flex/extension intact to wrist, thumb and fingers  Finger opposition intact  Finger adduction/abduction intact  Finger crossover intact  Subjectively states sensation intact to radial/medial/ulnar distribution  Incision well approximated with no redness, drainage or warmth, suture intact      XR:   3 views of R elbow demonstrating s/p distal humerus ORIF. Hardware remains intact without interval displacement, loosening, or failure. No significant change in alignment. No acute fractures or dislocations or any other osseus abnormality identified. Assessment:   Diagnosis Orders   1.  Other closed displaced fracture of distal end of right humerus with routine healing, subsequent encounter  Amb External Referral To Home Health          Plan:  Reviewed x-rays with patient today in office   Removed suture from surgical incision line. Steri strips were placed. Patient tolerated procedure well with minimal pain. No Soaking or submerging incision in water until skin is fully healed. WB:  Non-weight bearing right upper extremity- use assistive devices if needed  Therapy: continue [address] OT. Call for outpatient referral when needed. Multimodal pain control, RICE therapy prn  Transitioned to hinged elbow brace to follow distal humerus rehab protocol    Follow up in 4 weeks with XR of the R elbow    Electronically signed by Sowmya Francis PA-C on 3/6/2023 at 12:19 PM  Note: This report was completed using computerCyvera voiced recognition software. Every effort has been made to ensure accuracy; however, inadvertent computerized transcription errors may be present.

## 2023-03-06 NOTE — PROGRESS NOTES
Suzette Durbin is a 78 y.o. female who presents for follow up of right distal humerus orif    SURGEON: Dr. Delmer Gutierrez,   Date of Injury/Surgery:  02/15/2023      Symptoms: better  New complaints: patient states that she is unable to do anything b/c of the splint but has no pain    Cast/Splint, Brace, or Dressings: Clean, dry and intact, Well fitting, and Taken down for visit    Weightbearing: right upper Non-weight bearing      Assistive device Sling  Participating in therapy (location if yes)?  no    Refills Needed: None  Order/Referral Needed: no

## 2023-03-09 ENCOUNTER — TELEPHONE (OUTPATIENT)
Dept: ORTHOPEDIC SURGERY | Age: 78
End: 2023-03-09

## 2023-03-09 NOTE — TELEPHONE ENCOUNTER
Junie called with questions about patient's OT for her \"elbow ORIF.\" Requests call back.     Future Appointments   Date Time Provider Department Center   3/16/2023 12:00 PM [facility] CT SCAN 3 SEYZ CT [facility] Radiolo   3/16/2023  1:15 PM CIRA Alonso NSURG [facility]   3/17/2023  8:00 AM Deondre Monzon Jr., DPANGIE [place] P[place] [facility]   3/21/2023 11:00 AM MD ROBERTA Stewart [facility]   4/3/2023  8:00 AM SCHEDULE, SE ORTHO APC  Ortho [facility]

## 2023-03-09 NOTE — TELEPHONE ENCOUNTER
Called Junie back, no answer. Left voicemail to call office when available. Await call back.      Future Appointments   Date Time Provider Mariam Pricilla   3/16/2023 12:00 PM [place] SCAN 3 SEYZ CT [place] Radiolo   3/16/2023  1:15 PM CIRA Duarte NSURG [facility]   3/17/2023  8:00 AM JOE Pollack POD [facility]   3/21/2023 11:00 AM MD ROBERTA Dsouza [facility]   4/3/2023  8:00 AM SCHEDULE, SE ORTHO APC SE Ortho [facility]

## 2023-03-16 ENCOUNTER — OFFICE VISIT (OUTPATIENT)
Dept: NEUROSURGERY | Age: 78
End: 2023-03-16
Payer: MEDICARE

## 2023-03-16 ENCOUNTER — HOSPITAL ENCOUNTER (OUTPATIENT)
Dept: CT IMAGING | Age: 78
Discharge: HOME | End: 2023-03-18
Payer: MEDICARE

## 2023-03-16 DIAGNOSIS — S06.5XAA SUBDURAL HEMATOMA: ICD-10-CM

## 2023-03-16 DIAGNOSIS — S06.5XAA SUBDURAL HEMATOMA: Primary | ICD-10-CM

## 2023-03-16 PROCEDURE — 3017F COLORECTAL CA SCREEN DOC REV: CPT | Performed by: STUDENT IN AN ORGANIZED HEALTH CARE EDUCATION/TRAINING PROGRAM

## 2023-03-16 PROCEDURE — 1123F ACP DISCUSS/DSCN MKR DOCD: CPT | Performed by: STUDENT IN AN ORGANIZED HEALTH CARE EDUCATION/TRAINING PROGRAM

## 2023-03-16 PROCEDURE — 99212 OFFICE O/P EST SF 10 MIN: CPT

## 2023-03-16 PROCEDURE — G8420 CALC BMI NORM PARAMETERS: HCPCS | Performed by: STUDENT IN AN ORGANIZED HEALTH CARE EDUCATION/TRAINING PROGRAM

## 2023-03-16 PROCEDURE — G8427 DOCREV CUR MEDS BY ELIG CLIN: HCPCS | Performed by: STUDENT IN AN ORGANIZED HEALTH CARE EDUCATION/TRAINING PROGRAM

## 2023-03-16 PROCEDURE — 70450 CT HEAD/BRAIN W/O DYE: CPT

## 2023-03-16 PROCEDURE — 1036F TOBACCO NON-USER: CPT | Performed by: STUDENT IN AN ORGANIZED HEALTH CARE EDUCATION/TRAINING PROGRAM

## 2023-03-16 PROCEDURE — 1111F DSCHRG MED/CURRENT MED MERGE: CPT | Performed by: STUDENT IN AN ORGANIZED HEALTH CARE EDUCATION/TRAINING PROGRAM

## 2023-03-16 PROCEDURE — 99213 OFFICE O/P EST LOW 20 MIN: CPT | Performed by: STUDENT IN AN ORGANIZED HEALTH CARE EDUCATION/TRAINING PROGRAM

## 2023-03-16 PROCEDURE — 1090F PRES/ABSN URINE INCON ASSESS: CPT | Performed by: STUDENT IN AN ORGANIZED HEALTH CARE EDUCATION/TRAINING PROGRAM

## 2023-03-16 PROCEDURE — G8484 FLU IMMUNIZE NO ADMIN: HCPCS | Performed by: STUDENT IN AN ORGANIZED HEALTH CARE EDUCATION/TRAINING PROGRAM

## 2023-03-16 PROCEDURE — G8399 PT W/DXA RESULTS DOCUMENT: HCPCS | Performed by: STUDENT IN AN ORGANIZED HEALTH CARE EDUCATION/TRAINING PROGRAM

## 2023-03-16 NOTE — PROGRESS NOTES
Hospital Follow-up     This is a 78year old female who presents to the office for a 1 month follow-up s/p right SDH     Subjective: Patient states she is doing well. She denies any headaches, dizziness or visual changes. No new numbness or weakness. CT Head reviewed. Physical Exam:              WDWN, no apparent distress              Non-labored breathing               Vitals Stable              Alert and oriented x3              CN 3-12 intact              PERRL              EOMI              BOLAND well, right arm in splint              Sensation to LT intact bilaterally                 Imaging: 3/16/2023 CT Head   No acute hemorrhage noted-final read pending. Assessment: This is a 78 y.o.  female presenting for a 1 month follow-up s/p right SDH     Plan:  -Pain control and expectations discussed  -CT reviewed and discussed in detail  -No restrictions from a neurosurgical standpoint, continue with restrictions placed by orthopedic team  -OARRS report reviewed   -Follow-up in neurosurgery clinic prn  -Call or return to neurosurgery office sooner if symptoms worsen or if new issues arise in the interim.     Electronically signed by Sahil Martin PA-C on 3/16/2023 at 12:34 PM

## 2023-03-17 ENCOUNTER — PROCEDURE VISIT (OUTPATIENT)
Dept: PODIATRY | Age: 78
End: 2023-03-17
Payer: MEDICARE

## 2023-03-17 VITALS — HEIGHT: 62 IN | WEIGHT: 134 LBS | BODY MASS INDEX: 24.66 KG/M2

## 2023-03-17 DIAGNOSIS — R26.2 DIFFICULTY WALKING: ICD-10-CM

## 2023-03-17 DIAGNOSIS — M79.674 PAIN OF TOE OF RIGHT FOOT: ICD-10-CM

## 2023-03-17 DIAGNOSIS — E11.51 TYPE II DIABETES MELLITUS WITH PERIPHERAL CIRCULATORY DISORDER (HCC): ICD-10-CM

## 2023-03-17 DIAGNOSIS — I73.9 PVD (PERIPHERAL VASCULAR DISEASE) (HCC): ICD-10-CM

## 2023-03-17 DIAGNOSIS — B35.1 ONYCHOMYCOSIS: Primary | ICD-10-CM

## 2023-03-17 DIAGNOSIS — M79.675 PAIN OF TOE OF LEFT FOOT: ICD-10-CM

## 2023-03-17 PROCEDURE — 11721 DEBRIDE NAIL 6 OR MORE: CPT | Performed by: PODIATRIST

## 2023-03-17 NOTE — PROGRESS NOTES
3/17/23     Feliz Izaguirre    : 1945  Sex: female  Age: 78 y.o. Subjective: The patient is seen today for evaluation regarding diabetic foot evaluation and mycotic nail care. No other complaints noted. Chief Complaint   Patient presents with    Nail Problem     Routine nail care       Current Medications:    Current Outpatient Medications:     acetaminophen (TYLENOL) 500 MG tablet, Take 500 mg by mouth every 6 hours as needed for Pain, Disp: , Rfl:     amLODIPine (NORVASC) 5 MG tablet, Take 1 tablet by mouth daily, Disp: 30 tablet, Rfl: 0    pioglitazone (ACTOS) 45 MG tablet, Take 1 tablet by mouth daily, Disp: 30 tablet, Rfl: 0    glipiZIDE (GLUCOTROL) 5 MG tablet, Take 2 tablets by mouth 2 times daily, Disp: 60 tablet, Rfl: 0    Allergies:  No Known Allergies    Past Surgical History:   Procedure Laterality Date    ABDOMEN SURGERY      hysterectomy    BACK SURGERY      BACK SURGERY      3 back surgerys 3-4 year ago    BREAST BIOPSY Right     stereotactic    CARPAL TUNNEL RELEASE  2019    Left    HUMERUS FRACTURE SURGERY Right 2/15/2023    RIGHT HUMERUS OPEN REDUCTION INTERNAL FIXATION performed by Teresa Helton DO at [address] (CERVIX STATUS UNKNOWN)      SHOULDER SURGERY      RTC, B    SPINAL FUSION      lumbar    TONSILLECTOMY       Past Medical History:   Diagnosis Date    Arthritis     Carpal tunnel syndrome     Chronic back pain     Closed fracture of distal end of right humerus with routine healing 2023    Corns and callosities     Diabetes mellitus ([address]     Fibromyositis     Hyperlipidemia     Hypertension     Onychomycosis     Osteoarthritis     Osteoporosis     Peripheral vascular disease ([address]     Sciatica     Type 2 diabetes mellitus without complication ([address]        Vitals:    23 0803   Weight: 134 lb (60.8 kg)   Height: 5' 2\" (1.575 m)       Exam:  Pedal pulses diminished to palpation bilateral foot.   At this time the nail/s 1, 2, 5 right foot and nail/s 1, 2, 5 left foot are noted to be thickened, dystrophic and discolored with subungual debris present. Tenderness noted to palpation. Minimal hair growth is noted to both lower extremities. Edema noted with both varicosities and stasis skin changes present bilaterally. Coolness is noted to the digital regions to palpation. Capillary fill time delayed digital areas bilateral foot. No heel fissuring or macerations of the web spaces. No plantar calluses and/or ulcerative areas are noted. Patient is having difficulty with gait/walking. Plan Per Assessment  Caroline Schlatter was seen today for nail problem. Diagnoses and all orders for this visit:    Onychomycosis    Pain of toe of right foot    Pain of toe of left foot    PVD (peripheral vascular disease) ([address]    Type II diabetes mellitus with peripheral circulatory disorder (HCC)    Difficulty walking        1. Evaluation and Management  2. Manual and electrical debridement of the mycotic nails was performed for thickness and length to prevent injection and/or ulceration. 3. Discussed additional diabetic lower extremity care techniques with patient today. 4. It was discussed in detail with the patient proper caring for the vascular compromised foot. The fact that they have compromised blood flow put the patient at risk for infection/gangrene/amputation. The patient should not walk barefoot. Shoe gear should fit properly and socks should be worn with shoes. If any skin lesions are noted, they are instructed to contact the office immediately. 5. We will see the patient back at a later date for continued podiatric management and care. Patient was advised to call the office with any questions or concerns prior to their next appointment if needed. Seen By:    Deana Cohen DPM    Electronically signed by Deana Cohen DPM on 3/17/2023 at 8:25 AM      This note was created using voice recognition software.   The note was reviewed however may contain grammatical errors.

## 2023-03-17 NOTE — PROGRESS NOTES
Patient in today for nail care. Patient does not have any complaints of pain at this time. Patient's PCP is Hector Skinner MD date of last ov  02/17/2023.     Tara Raymundo LPN

## 2023-03-21 ENCOUNTER — OFFICE VISIT (OUTPATIENT)
Dept: FAMILY MEDICINE CLINIC | Age: 78
End: 2023-03-21

## 2023-03-21 VITALS
HEART RATE: 78 BPM | TEMPERATURE: 97.6 F | BODY MASS INDEX: 24.73 KG/M2 | RESPIRATION RATE: 18 BRPM | DIASTOLIC BLOOD PRESSURE: 74 MMHG | OXYGEN SATURATION: 98 % | HEIGHT: 62 IN | SYSTOLIC BLOOD PRESSURE: 138 MMHG | WEIGHT: 134.4 LBS

## 2023-03-21 DIAGNOSIS — I10 PRIMARY HYPERTENSION: ICD-10-CM

## 2023-03-21 DIAGNOSIS — E11.22 TYPE 2 DIABETES MELLITUS WITH STAGE 3B CHRONIC KIDNEY DISEASE, WITHOUT LONG-TERM CURRENT USE OF INSULIN (HCC): ICD-10-CM

## 2023-03-21 DIAGNOSIS — N18.32 TYPE 2 DIABETES MELLITUS WITH STAGE 3B CHRONIC KIDNEY DISEASE, WITHOUT LONG-TERM CURRENT USE OF INSULIN (HCC): ICD-10-CM

## 2023-03-21 DIAGNOSIS — S06.5XAA SUBDURAL HEMATOMA (HCC): ICD-10-CM

## 2023-03-21 DIAGNOSIS — Z09 HOSPITAL DISCHARGE FOLLOW-UP: Primary | ICD-10-CM

## 2023-03-21 DIAGNOSIS — S42.401D CLOSED FRACTURE OF DISTAL END OF RIGHT HUMERUS WITH ROUTINE HEALING, UNSPECIFIED FRACTURE MORPHOLOGY, SUBSEQUENT ENCOUNTER: ICD-10-CM

## 2023-03-21 RX ORDER — GLIPIZIDE 5 MG/1
10 TABLET ORAL 2 TIMES DAILY
Qty: 180 TABLET | Refills: 3 | Status: SHIPPED | OUTPATIENT
Start: 2023-03-21

## 2023-03-21 RX ORDER — PIOGLITAZONEHYDROCHLORIDE 45 MG/1
45 TABLET ORAL DAILY
Qty: 90 TABLET | Refills: 1 | Status: SHIPPED | OUTPATIENT
Start: 2023-03-21

## 2023-03-21 RX ORDER — AMLODIPINE BESYLATE 5 MG/1
5 TABLET ORAL DAILY
Qty: 90 TABLET | Refills: 1 | Status: SHIPPED | OUTPATIENT
Start: 2023-03-21

## 2023-03-21 SDOH — ECONOMIC STABILITY: FOOD INSECURITY: WITHIN THE PAST 12 MONTHS, THE FOOD YOU BOUGHT JUST DIDN'T LAST AND YOU DIDN'T HAVE MONEY TO GET MORE.: NEVER TRUE

## 2023-03-21 SDOH — ECONOMIC STABILITY: FOOD INSECURITY: WITHIN THE PAST 12 MONTHS, YOU WORRIED THAT YOUR FOOD WOULD RUN OUT BEFORE YOU GOT MONEY TO BUY MORE.: NEVER TRUE

## 2023-03-21 SDOH — ECONOMIC STABILITY: HOUSING INSECURITY
IN THE LAST 12 MONTHS, WAS THERE A TIME WHEN YOU DID NOT HAVE A STEADY PLACE TO SLEEP OR SLEPT IN A SHELTER (INCLUDING NOW)?: NO

## 2023-03-21 SDOH — ECONOMIC STABILITY: INCOME INSECURITY: HOW HARD IS IT FOR YOU TO PAY FOR THE VERY BASICS LIKE FOOD, HOUSING, MEDICAL CARE, AND HEATING?: NOT HARD AT ALL

## 2023-03-21 ASSESSMENT — PATIENT HEALTH QUESTIONNAIRE - PHQ9
2. FEELING DOWN, DEPRESSED OR HOPELESS: 0
SUM OF ALL RESPONSES TO PHQ QUESTIONS 1-9: 0
1. LITTLE INTEREST OR PLEASURE IN DOING THINGS: 0
SUM OF ALL RESPONSES TO PHQ QUESTIONS 1-9: 0
SUM OF ALL RESPONSES TO PHQ QUESTIONS 1-9: 0
SUM OF ALL RESPONSES TO PHQ9 QUESTIONS 1 & 2: 0
SUM OF ALL RESPONSES TO PHQ QUESTIONS 1-9: 0

## 2023-03-21 ASSESSMENT — ENCOUNTER SYMPTOMS
SHORTNESS OF BREATH: 0
ABDOMINAL PAIN: 0
CHEST TIGHTNESS: 0
SINUS PRESSURE: 0
BLOOD IN STOOL: 0
EYE REDNESS: 0
COUGH: 0
WHEEZING: 0
PHOTOPHOBIA: 0
SORE THROAT: 0
CONSTIPATION: 0
VOMITING: 0
DIARRHEA: 0
EYES NEGATIVE: 1

## 2023-03-21 NOTE — PROGRESS NOTES
changes made  Type 2 diabetes mellitus with stage 3b chronic kidney disease, without long-term current use of insulin (HCC)  Most recent A1C mid 7's will watch  Subdural hematoma  Resolved without surgery or significant neurological symptoms  Closed fracture of distal end of right humerus with routine healing, unspecified fracture morphology, subsequent encounter  Required ORIF and hardware (Dr. Steffanie Rogers). Doing very well, takes occasional Tramadol  Other orders  -     pioglitazone (ACTOS) 45 MG tablet; Take 1 tablet by mouth daily  -     glipiZIDE (GLUCOTROL) 5 MG tablet; Take 2 tablets by mouth 2 times daily        Return in about 3 months (around 6/21/2023).     Electronically signed by Fermin Solis MD on 3/21/23 at 11:33 AM EDT

## 2023-03-31 DIAGNOSIS — S42.491D OTHER CLOSED DISPLACED FRACTURE OF DISTAL END OF RIGHT HUMERUS WITH ROUTINE HEALING, SUBSEQUENT ENCOUNTER: Primary | ICD-10-CM

## 2023-04-03 ENCOUNTER — OFFICE VISIT (OUTPATIENT)
Dept: ORTHOPEDIC SURGERY | Age: 78
End: 2023-04-03
Payer: MEDICARE

## 2023-04-03 ENCOUNTER — HOSPITAL ENCOUNTER (OUTPATIENT)
Dept: GENERAL RADIOLOGY | Age: 78
Discharge: HOME | End: 2023-04-05
Payer: MEDICARE

## 2023-04-03 DIAGNOSIS — S42.491D OTHER CLOSED DISPLACED FRACTURE OF DISTAL END OF RIGHT HUMERUS WITH ROUTINE HEALING, SUBSEQUENT ENCOUNTER: ICD-10-CM

## 2023-04-03 DIAGNOSIS — S42.491D OTHER CLOSED DISPLACED FRACTURE OF DISTAL END OF RIGHT HUMERUS WITH ROUTINE HEALING, SUBSEQUENT ENCOUNTER: Primary | ICD-10-CM

## 2023-04-03 PROCEDURE — 99212 OFFICE O/P EST SF 10 MIN: CPT

## 2023-04-03 PROCEDURE — 99024 POSTOP FOLLOW-UP VISIT: CPT | Performed by: PHYSICIAN ASSISTANT

## 2023-04-03 PROCEDURE — 73080 X-RAY EXAM OF ELBOW: CPT

## 2023-04-03 NOTE — PATIENT INSTRUCTIONS
Partial weightbearing right upper extremity. Patient will be set up for PT at [facility] in [address] [place] [address] to stop wearing hinged brace. Follow-up in 6 weeks for reevaluation and x-rays. Call if any questions or concerns.

## 2023-04-03 NOTE — PROGRESS NOTES
Tiffany De La Cruz is a 78 y.o. female who presents for follow up of right distal humerus orif    SURGEON: Dr. Remy Rae,   Date of Injury/Surgery:  02/15/2023  Date last seen in office:  03/06/2023    Symptoms: better  New complaints: Right distal humerus orif; dos: 02/15/2023; 0/10 pain; patient states that her shoulder is sore due to the weight of the brace; patient states she has one home therapy session left, patient would like to be able to drive    Cast/Splint, Brace, or Dressings: Clean, dry and intact, Well fitting, and Taken down for visit    Weightbearing: right upper Non-weight bearing      Assistive device Hinged Elbow brace  Participating in therapy (location if yes)?  yes, in home PT    Refills Needed: None  Order/Referral Needed: no
transcondylar distal humerus fracture with hardware in stable position and alignment. No evidence of hardware loosening or failure. Assessment:   Diagnosis Orders   1. Other closed displaced fracture of distal end of right humerus with routine healing, subsequent encounter          Plan:  Xrays reviewed with patient. Plan of care discussed in detail, all questions sought and answered to patients satisfaction at this time. Partial weightbearing right upper extremity. Patient will be set up for PT at [facility] in [address] [place] [address] to stop wearing hinged brace. Follow-up in 6 weeks for reevaluation and x-rays. Call if any questions or concerns. Electronically signed by COOPER Crabtree on 4/3/2023 at 8:57 AM  Note: This report was completed using computerize voiced recognition software. Every effort has been made to ensure accuracy; however, inadvertent computerized transcription errors may be present.

## 2023-05-11 ENCOUNTER — HOSPITAL ENCOUNTER (OUTPATIENT)
Dept: GENERAL RADIOLOGY | Age: 78
Discharge: HOME | End: 2023-05-13
Payer: MEDICARE

## 2023-05-11 ENCOUNTER — OFFICE VISIT (OUTPATIENT)
Dept: ORTHOPEDIC SURGERY | Age: 78
End: 2023-05-11
Payer: MEDICARE

## 2023-05-11 VITALS — BODY MASS INDEX: 24.66 KG/M2 | HEIGHT: 62 IN | WEIGHT: 134 LBS

## 2023-05-11 DIAGNOSIS — S42.491D OTHER CLOSED DISPLACED FRACTURE OF DISTAL END OF RIGHT HUMERUS WITH ROUTINE HEALING, SUBSEQUENT ENCOUNTER: Primary | ICD-10-CM

## 2023-05-11 DIAGNOSIS — S42.491D OTHER CLOSED DISPLACED FRACTURE OF DISTAL END OF RIGHT HUMERUS WITH ROUTINE HEALING, SUBSEQUENT ENCOUNTER: ICD-10-CM

## 2023-05-11 PROCEDURE — 73080 X-RAY EXAM OF ELBOW: CPT

## 2023-05-11 PROCEDURE — 99213 OFFICE O/P EST LOW 20 MIN: CPT

## 2023-05-11 NOTE — PATIENT INSTRUCTIONS
Activities as tolerated right upper extremity. Continue PT. Follow-up in 3 months for reevaluation and x-rays. Call if any questions or concerns.

## 2023-05-11 NOTE — PROGRESS NOTES
Chief Complaint   Patient presents with    Follow-up     Patient presents from home for post op R humerus ORIF. Patient reports that she is in PT at [facility] and only has mild discomfort that she takes tylenol for. OP:SURGEON: Dr. Magalis Jane DO  DATE OF PROCEDURE: 2-15-23  PROCEDURE: Open reduction total fixation right transcondylar distal humerus fracture     POD: 12 weeks    Subjective:  Merle Zaragoza is following up from the above surgery. She is PWB on right upper extremity. She ambulates with no assistive device. Pain to extremity is none and is not taking prescribed pain medication. They denies numbness or tingling to the right upper extremity. Denies calf pain, chest pain, or shortness of breath. Patient is  participating in therapy, outpatient therapy. She is doing well after surgery. States that she is making good progress in therapy. She has some mild stiffness in the elbow but is able to do all of her daily activities without difficulty. Review of Systems -  All pertinent positives/negatives per HPI     Objective:    General: Alert and oriented X 3, normocephalic atraumatic, external ears and eye normal, sclera clear, no acute distress, respirations easy and unlabored with no audible wheezes, skin warm and dry, speech and dress appropriate for noted age, affect euthymic.     Extremity:  Right Upper Extremity  Skin is clean dry and intact   mild edema noted to the elbow area  2+ Radial pulse, fingers warm with BCR  Flex/extension intact to wrist, thumb and fingers   Finger opposition intact  Finger adduction/abduction intact  Finger crossover intact  able to make concentric fist  Elbow arc of motion 3-140 without pain  Full motion with supination/pronation  Subjectively states sensation is intact to light touch over the Median Nerve, Ulnar Nerve, and Radial Nerve distribution  Incision well-healed    Ht 5' 2\" (1.575 m)   Wt 134 lb (60.8 kg)   BMI 24.51 kg/m²     XR:   3 views right elbow

## 2023-06-05 DIAGNOSIS — S99.922A INJURY OF LEFT FOOT, INITIAL ENCOUNTER: Primary | ICD-10-CM

## 2023-06-05 DIAGNOSIS — M19.072 ARTHRITIS OF LEFT FOOT: ICD-10-CM

## 2023-06-05 DIAGNOSIS — S86.312A PERONEAL TENDON TEAR, LEFT, INITIAL ENCOUNTER: ICD-10-CM

## 2023-06-05 DIAGNOSIS — R26.2 DIFFICULTY WALKING: ICD-10-CM

## 2023-07-03 DIAGNOSIS — I10 PRIMARY HYPERTENSION: ICD-10-CM

## 2023-07-03 RX ORDER — AMLODIPINE BESYLATE 5 MG/1
5 TABLET ORAL DAILY
Qty: 90 TABLET | Refills: 1 | Status: SHIPPED | OUTPATIENT
Start: 2023-07-03

## 2023-07-03 NOTE — TELEPHONE ENCOUNTER
Last Appointment:  6/13/2023  Future Appointments   Date Time Provider [address]   8/15/2023  8:30 AM [address], DO [facility]   9/18/2023  7:00 AM JOE Stephenson P[facility]   9/18/2023  8:45 AM Faith Scott MD [facility]      Sofía Officer requesting refill on norvasc sent to [facility] in [place].

## 2023-07-06 DIAGNOSIS — I73.9 PVD (PERIPHERAL VASCULAR DISEASE) (HCC): ICD-10-CM

## 2023-07-06 DIAGNOSIS — M79.674 PAIN OF TOE OF RIGHT FOOT: Primary | ICD-10-CM

## 2023-07-06 DIAGNOSIS — R26.2 DIFFICULTY WALKING: ICD-10-CM

## 2023-07-06 DIAGNOSIS — M79.675 PAIN OF TOE OF LEFT FOOT: ICD-10-CM

## 2023-07-17 ENCOUNTER — TELEPHONE (OUTPATIENT)
Dept: PODIATRY | Age: 78
End: 2023-07-17

## 2023-07-17 DIAGNOSIS — E11.9 TYPE 2 DIABETES MELLITUS WITHOUT COMPLICATION, WITH LONG-TERM CURRENT USE OF INSULIN (HCC): ICD-10-CM

## 2023-07-17 DIAGNOSIS — Z79.4 TYPE 2 DIABETES MELLITUS WITHOUT COMPLICATION, WITH LONG-TERM CURRENT USE OF INSULIN (HCC): ICD-10-CM

## 2023-07-17 DIAGNOSIS — M79.671 PAIN IN BOTH FEET: Primary | ICD-10-CM

## 2023-07-17 DIAGNOSIS — R26.2 DISABILITY OF WALKING: ICD-10-CM

## 2023-07-17 DIAGNOSIS — M79.672 PAIN IN BOTH FEET: Primary | ICD-10-CM

## 2023-07-17 NOTE — TELEPHONE ENCOUNTER
Patient called to ask if we would re-write her order to [address] [place] [address] to include the word \"diabetic\" as her insurance is denying the claim due to lack of that word on the order. Re-ordered including the word diabetic and faxed to [address] [place] [address] in [address]

## 2023-08-14 DIAGNOSIS — S42.491D OTHER CLOSED DISPLACED FRACTURE OF DISTAL END OF RIGHT HUMERUS WITH ROUTINE HEALING, SUBSEQUENT ENCOUNTER: Primary | ICD-10-CM

## 2023-08-15 ENCOUNTER — OFFICE VISIT (OUTPATIENT)
Dept: ORTHOPEDIC SURGERY | Age: 78
End: 2023-08-15
Payer: MEDICARE

## 2023-08-15 VITALS — BODY MASS INDEX: 25.76 KG/M2 | HEIGHT: 62 IN | WEIGHT: 140 LBS

## 2023-08-15 DIAGNOSIS — S42.491D OTHER CLOSED DISPLACED FRACTURE OF DISTAL END OF RIGHT HUMERUS WITH ROUTINE HEALING, SUBSEQUENT ENCOUNTER: Primary | ICD-10-CM

## 2023-08-15 PROCEDURE — 3017F COLORECTAL CA SCREEN DOC REV: CPT | Performed by: ORTHOPAEDIC SURGERY

## 2023-08-15 PROCEDURE — 1123F ACP DISCUSS/DSCN MKR DOCD: CPT | Performed by: ORTHOPAEDIC SURGERY

## 2023-08-15 PROCEDURE — 1090F PRES/ABSN URINE INCON ASSESS: CPT | Performed by: ORTHOPAEDIC SURGERY

## 2023-08-15 PROCEDURE — 99213 OFFICE O/P EST LOW 20 MIN: CPT | Performed by: ORTHOPAEDIC SURGERY

## 2023-08-15 PROCEDURE — G8399 PT W/DXA RESULTS DOCUMENT: HCPCS | Performed by: ORTHOPAEDIC SURGERY

## 2023-08-15 PROCEDURE — 1036F TOBACCO NON-USER: CPT | Performed by: ORTHOPAEDIC SURGERY

## 2023-08-15 PROCEDURE — G8427 DOCREV CUR MEDS BY ELIG CLIN: HCPCS | Performed by: ORTHOPAEDIC SURGERY

## 2023-08-15 PROCEDURE — G8417 CALC BMI ABV UP PARAM F/U: HCPCS | Performed by: ORTHOPAEDIC SURGERY

## 2023-08-15 NOTE — PROGRESS NOTES
hardware in stable position and alignment. No evidence of hardware loosening or failure. Assessment:   Diagnosis Orders   1. Other closed displaced fracture of distal end of right humerus with routine healing, subsequent encounter            Plan:  Again reviewed x-rays discussed fracture appears to be well-healed clinically and radiographically  Patient continue with activities as tolerated  Discussed if any issues or concerns moving forward she is to contact the office for further follow-up    Electronically signed by Aurea Trevizo DO on 8/15/2023     Note: This report was completed using [employer] voiced recognition software. Every effort has been made to ensure accuracy; however, inadvertent computerized transcription errors may be present.

## 2023-09-18 ENCOUNTER — OFFICE VISIT (OUTPATIENT)
Dept: FAMILY MEDICINE CLINIC | Age: 78
End: 2023-09-18
Payer: MEDICARE

## 2023-09-18 ENCOUNTER — TELEPHONE (OUTPATIENT)
Dept: FAMILY MEDICINE CLINIC | Age: 78
End: 2023-09-18

## 2023-09-18 ENCOUNTER — PROCEDURE VISIT (OUTPATIENT)
Dept: PODIATRY | Age: 78
End: 2023-09-18
Payer: MEDICARE

## 2023-09-18 VITALS
HEIGHT: 62 IN | SYSTOLIC BLOOD PRESSURE: 138 MMHG | WEIGHT: 147.2 LBS | TEMPERATURE: 98.7 F | BODY MASS INDEX: 27.09 KG/M2 | HEART RATE: 76 BPM | OXYGEN SATURATION: 98 % | DIASTOLIC BLOOD PRESSURE: 82 MMHG | RESPIRATION RATE: 18 BRPM

## 2023-09-18 VITALS — WEIGHT: 140 LBS | HEIGHT: 62 IN | BODY MASS INDEX: 25.76 KG/M2

## 2023-09-18 DIAGNOSIS — E11.69 TYPE 2 DIABETES MELLITUS WITH OTHER SPECIFIED COMPLICATION, UNSPECIFIED WHETHER LONG TERM INSULIN USE (HCC): Primary | ICD-10-CM

## 2023-09-18 DIAGNOSIS — M15.9 PRIMARY OSTEOARTHRITIS INVOLVING MULTIPLE JOINTS: ICD-10-CM

## 2023-09-18 DIAGNOSIS — I73.9 PVD (PERIPHERAL VASCULAR DISEASE) (HCC): ICD-10-CM

## 2023-09-18 DIAGNOSIS — B35.1 ONYCHOMYCOSIS: Primary | ICD-10-CM

## 2023-09-18 DIAGNOSIS — E78.49 OTHER HYPERLIPIDEMIA: ICD-10-CM

## 2023-09-18 DIAGNOSIS — I10 PRIMARY HYPERTENSION: ICD-10-CM

## 2023-09-18 DIAGNOSIS — M79.674 PAIN OF TOE OF RIGHT FOOT: ICD-10-CM

## 2023-09-18 DIAGNOSIS — E11.51 TYPE II DIABETES MELLITUS WITH PERIPHERAL CIRCULATORY DISORDER (HCC): ICD-10-CM

## 2023-09-18 DIAGNOSIS — M15.9 PRIMARY OSTEOARTHRITIS INVOLVING MULTIPLE JOINTS: Primary | ICD-10-CM

## 2023-09-18 DIAGNOSIS — R26.2 DIFFICULTY WALKING: ICD-10-CM

## 2023-09-18 DIAGNOSIS — M79.675 PAIN OF TOE OF LEFT FOOT: ICD-10-CM

## 2023-09-18 LAB — HBA1C MFR BLD: 6.7 %

## 2023-09-18 PROCEDURE — 83036 HEMOGLOBIN GLYCOSYLATED A1C: CPT | Performed by: FAMILY MEDICINE

## 2023-09-18 PROCEDURE — G8427 DOCREV CUR MEDS BY ELIG CLIN: HCPCS | Performed by: FAMILY MEDICINE

## 2023-09-18 PROCEDURE — 99999 PR OFFICE/OUTPT VISIT,PROCEDURE ONLY: CPT | Performed by: PODIATRIST

## 2023-09-18 PROCEDURE — 99214 OFFICE O/P EST MOD 30 MIN: CPT | Performed by: FAMILY MEDICINE

## 2023-09-18 PROCEDURE — 1036F TOBACCO NON-USER: CPT | Performed by: FAMILY MEDICINE

## 2023-09-18 PROCEDURE — 2022F DILAT RTA XM EVC RTNOPTHY: CPT | Performed by: FAMILY MEDICINE

## 2023-09-18 PROCEDURE — 3075F SYST BP GE 130 - 139MM HG: CPT | Performed by: FAMILY MEDICINE

## 2023-09-18 PROCEDURE — 11721 DEBRIDE NAIL 6 OR MORE: CPT | Performed by: PODIATRIST

## 2023-09-18 PROCEDURE — G8417 CALC BMI ABV UP PARAM F/U: HCPCS | Performed by: FAMILY MEDICINE

## 2023-09-18 PROCEDURE — 3079F DIAST BP 80-89 MM HG: CPT | Performed by: FAMILY MEDICINE

## 2023-09-18 PROCEDURE — 3017F COLORECTAL CA SCREEN DOC REV: CPT | Performed by: FAMILY MEDICINE

## 2023-09-18 PROCEDURE — 1123F ACP DISCUSS/DSCN MKR DOCD: CPT | Performed by: FAMILY MEDICINE

## 2023-09-18 PROCEDURE — 1090F PRES/ABSN URINE INCON ASSESS: CPT | Performed by: FAMILY MEDICINE

## 2023-09-18 PROCEDURE — G8399 PT W/DXA RESULTS DOCUMENT: HCPCS | Performed by: FAMILY MEDICINE

## 2023-09-18 PROCEDURE — 3044F HG A1C LEVEL LT 7.0%: CPT | Performed by: FAMILY MEDICINE

## 2023-09-18 RX ORDER — PIOGLITAZONEHYDROCHLORIDE 45 MG/1
45 TABLET ORAL DAILY
Qty: 90 TABLET | Refills: 1 | Status: SHIPPED | OUTPATIENT
Start: 2023-09-18

## 2023-09-18 RX ORDER — AMLODIPINE BESYLATE 5 MG/1
5 TABLET ORAL DAILY
Qty: 90 TABLET | Refills: 1 | Status: SHIPPED | OUTPATIENT
Start: 2023-09-18

## 2023-09-18 RX ORDER — CELECOXIB 200 MG/1
200 CAPSULE ORAL DAILY
Qty: 30 CAPSULE | Refills: 5 | Status: SHIPPED | OUTPATIENT
Start: 2023-09-18

## 2023-09-18 ASSESSMENT — ENCOUNTER SYMPTOMS
BLOOD IN STOOL: 0
WHEEZING: 0
PHOTOPHOBIA: 0
EYE REDNESS: 0
COUGH: 0
SORE THROAT: 0
SINUS PRESSURE: 1
ABDOMINAL PAIN: 0

## 2023-09-18 NOTE — TELEPHONE ENCOUNTER
Esther Nuñez is at the pharmacy ([address] [place] [address] in [place]) and she picked up her Actos. I see the norvasc was sent as well but they are saying they did not receive it. She was also wondering if you were sending her celebrex as well.

## 2023-09-18 NOTE — PROGRESS NOTES
23     El Nahomi    : 1945  Sex: female  Age: 78 y.o. Subjective: The patient is seen today for evaluation regarding foot evaluation and mycotic nail care. No other complaints noted. Chief Complaint   Patient presents with    Nail Problem     Nail care    Foot Pain     Left foot        Current Medications:    Current Outpatient Medications:     amLODIPine (NORVASC) 5 MG tablet, Take 1 tablet by mouth daily, Disp: 90 tablet, Rfl: 1    pioglitazone (ACTOS) 45 MG tablet, Take 1 tablet by mouth daily, Disp: 90 tablet, Rfl: 1    glipiZIDE (GLUCOTROL) 5 MG tablet, Take 2 tablets by mouth 2 times daily, Disp: 180 tablet, Rfl: 3    acetaminophen (TYLENOL) 500 MG tablet, Take 1 tablet by mouth every 6 hours as needed for Pain, Disp: , Rfl:     Allergies:  No Known Allergies    Past Surgical History:   Procedure Laterality Date    ABDOMEN SURGERY      hysterectomy    BACK SURGERY      BACK SURGERY      3 back surgerys 3-4 year ago    BREAST BIOPSY Right     stereotactic    CARPAL TUNNEL RELEASE  2019    Left    HUMERUS FRACTURE SURGERY Right 2/15/2023    RIGHT HUMERUS OPEN REDUCTION INTERNAL FIXATION performed by Matthew Stanton DO at [address] [place] [address] ([address] [place] [address])      SHOULDER SURGERY      RTC, B    SPINAL FUSION      lumbar    TONSILLECTOMY       Past Medical History:   Diagnosis Date    Arthritis     Carpal tunnel syndrome     Chronic back pain     Closed fracture of distal end of right humerus with routine healing 2023    Corns and callosities     Diabetes mellitus ([address])     Fibromyositis     Hyperlipidemia     Hypertension     Onychomycosis     Osteoarthritis     Osteoporosis     Peripheral vascular disease ([address])     Sciatica     Type 2 diabetes mellitus without complication ([address])        Vitals:    23 0709   Weight: 140 lb (63.5 kg)   Height: 5' 2\" (1.575 m)       Exam:  Pedal pulses diminished to palpation bilateral foot.   At this time the nail/s 1, 2, 5

## 2023-09-25 ENCOUNTER — HOSPITAL ENCOUNTER (OUTPATIENT)
Age: 78
Discharge: HOME | End: 2023-09-27

## 2023-09-25 PROCEDURE — 88305 TISSUE EXAM BY PATHOLOGIST: CPT

## 2023-09-27 LAB — SURGICAL PATHOLOGY REPORT: NORMAL

## 2023-11-30 ENCOUNTER — OFFICE VISIT (OUTPATIENT)
Dept: FAMILY MEDICINE CLINIC | Age: 78
End: 2023-11-30
Payer: MEDICARE

## 2023-11-30 VITALS
SYSTOLIC BLOOD PRESSURE: 138 MMHG | WEIGHT: 147 LBS | HEART RATE: 67 BPM | BODY MASS INDEX: 27.05 KG/M2 | TEMPERATURE: 97.4 F | OXYGEN SATURATION: 97 % | RESPIRATION RATE: 18 BRPM | DIASTOLIC BLOOD PRESSURE: 84 MMHG | HEIGHT: 62 IN

## 2023-11-30 DIAGNOSIS — N18.32 TYPE 2 DIABETES MELLITUS WITH STAGE 3B CHRONIC KIDNEY DISEASE, WITHOUT LONG-TERM CURRENT USE OF INSULIN (HCC): ICD-10-CM

## 2023-11-30 DIAGNOSIS — Z00.00 MEDICARE ANNUAL WELLNESS VISIT, SUBSEQUENT: Primary | ICD-10-CM

## 2023-11-30 DIAGNOSIS — I10 PRIMARY HYPERTENSION: ICD-10-CM

## 2023-11-30 DIAGNOSIS — E11.22 TYPE 2 DIABETES MELLITUS WITH STAGE 3B CHRONIC KIDNEY DISEASE, WITHOUT LONG-TERM CURRENT USE OF INSULIN (HCC): ICD-10-CM

## 2023-11-30 DIAGNOSIS — Z12.31 ENCOUNTER FOR SCREENING MAMMOGRAM FOR BREAST CANCER: ICD-10-CM

## 2023-11-30 DIAGNOSIS — E78.49 OTHER HYPERLIPIDEMIA: ICD-10-CM

## 2023-11-30 DIAGNOSIS — E11.69 TYPE 2 DIABETES MELLITUS WITH OTHER SPECIFIED COMPLICATION, UNSPECIFIED WHETHER LONG TERM INSULIN USE (HCC): ICD-10-CM

## 2023-11-30 LAB
ABSOLUTE IMMATURE GRANULOCYTE: <0.03 K/UL (ref 0–0.58)
ALBUMIN SERPL-MCNC: 4.2 G/DL (ref 3.5–5.2)
ALP BLD-CCNC: 72 U/L (ref 35–104)
ALT SERPL-CCNC: 16 U/L (ref 0–32)
ANION GAP SERPL CALCULATED.3IONS-SCNC: 17 MMOL/L (ref 7–16)
AST SERPL-CCNC: 25 U/L (ref 0–31)
BACTERIA: ABNORMAL
BASOPHILS ABSOLUTE: 0.06 K/UL (ref 0–0.2)
BASOPHILS RELATIVE PERCENT: 1 % (ref 0–2)
BILIRUB SERPL-MCNC: 0.2 MG/DL (ref 0–1.2)
BILIRUBIN URINE: NEGATIVE
BUN BLDV-MCNC: 30 MG/DL (ref 6–23)
CALCIUM SERPL-MCNC: 9.6 MG/DL (ref 8.6–10.2)
CHLORIDE BLD-SCNC: 104 MMOL/L (ref 98–107)
CHOLESTEROL: 225 MG/DL
CO2: 19 MMOL/L (ref 22–29)
COLOR: ABNORMAL
CREAT SERPL-MCNC: 1.2 MG/DL (ref 0.5–1)
CREATININE URINE: 95.4 MG/DL (ref 29–226)
EOSINOPHILS ABSOLUTE: 0.16 K/UL (ref 0.05–0.5)
EOSINOPHILS RELATIVE PERCENT: 2 % (ref 0–6)
GFR SERPL CREATININE-BSD FRML MDRD: 48 ML/MIN/1.73M2
GLUCOSE BLD-MCNC: 85 MG/DL (ref 74–99)
GLUCOSE URINE: NEGATIVE MG/DL
HCT VFR BLD CALC: 38.9 % (ref 34–48)
HDLC SERPL-MCNC: 59 MG/DL
HEMOGLOBIN: 12.4 G/DL (ref 11.5–15.5)
IMMATURE GRANULOCYTES: 0 % (ref 0–5)
KETONES, URINE: NEGATIVE MG/DL
LDL CHOLESTEROL: 105 MG/DL
LEUKOCYTE ESTERASE, URINE: ABNORMAL
LYMPHOCYTES ABSOLUTE: 2.03 K/UL (ref 1.5–4)
LYMPHOCYTES RELATIVE PERCENT: 26 % (ref 20–42)
MCH RBC QN AUTO: 32.5 PG (ref 26–35)
MCHC RBC AUTO-ENTMCNC: 31.9 G/DL (ref 32–34.5)
MCV RBC AUTO: 101.8 FL (ref 80–99.9)
MICROALBUMIN/CREAT 24H UR: 131 MG/L (ref 0–19)
MICROALBUMIN/CREAT UR-RTO: 138 MCG/MG CREAT (ref 0–30)
MONOCYTES ABSOLUTE: 0.43 K/UL (ref 0.1–0.95)
MONOCYTES RELATIVE PERCENT: 6 % (ref 2–12)
NEUTROPHILS ABSOLUTE: 5.14 K/UL (ref 1.8–7.3)
NEUTROPHILS RELATIVE PERCENT: 66 % (ref 43–80)
NITRITE, URINE: POSITIVE
PDW BLD-RTO: 12.6 % (ref 11.5–15)
PH UA: 6 (ref 5–9)
PLATELET # BLD: 287 K/UL (ref 130–450)
PMV BLD AUTO: 10 FL (ref 7–12)
POTASSIUM SERPL-SCNC: 4.6 MMOL/L (ref 3.5–5)
PROTEIN UA: ABNORMAL MG/DL
RBC # BLD: 3.82 M/UL (ref 3.5–5.5)
RBC UA: ABNORMAL /HPF
SODIUM BLD-SCNC: 140 MMOL/L (ref 132–146)
SPECIFIC GRAVITY UA: 1.02 (ref 1–1.03)
TOTAL PROTEIN: 7.1 G/DL (ref 6.4–8.3)
TRIGL SERPL-MCNC: 304 MG/DL
TSH SERPL DL<=0.05 MIU/L-ACNC: 1.87 UIU/ML (ref 0.27–4.2)
TURBIDITY: ABNORMAL
URINE HGB: NEGATIVE
UROBILINOGEN, URINE: 0.2 EU/DL (ref 0–1)
VLDLC SERPL CALC-MCNC: 61 MG/DL
WBC # BLD: 7.8 K/UL (ref 4.5–11.5)
WBC UA: ABNORMAL /HPF

## 2023-11-30 PROCEDURE — G8484 FLU IMMUNIZE NO ADMIN: HCPCS | Performed by: FAMILY MEDICINE

## 2023-11-30 PROCEDURE — 3075F SYST BP GE 130 - 139MM HG: CPT | Performed by: FAMILY MEDICINE

## 2023-11-30 PROCEDURE — 1123F ACP DISCUSS/DSCN MKR DOCD: CPT | Performed by: FAMILY MEDICINE

## 2023-11-30 PROCEDURE — 3044F HG A1C LEVEL LT 7.0%: CPT | Performed by: FAMILY MEDICINE

## 2023-11-30 PROCEDURE — 3079F DIAST BP 80-89 MM HG: CPT | Performed by: FAMILY MEDICINE

## 2023-11-30 PROCEDURE — 93000 ELECTROCARDIOGRAM COMPLETE: CPT | Performed by: FAMILY MEDICINE

## 2023-11-30 PROCEDURE — G0439 PPPS, SUBSEQ VISIT: HCPCS | Performed by: FAMILY MEDICINE

## 2023-11-30 ASSESSMENT — ENCOUNTER SYMPTOMS
CHEST TIGHTNESS: 0
EYES NEGATIVE: 1
ABDOMINAL PAIN: 0
WHEEZING: 0
COUGH: 0
PHOTOPHOBIA: 0
EYE REDNESS: 0
SHORTNESS OF BREATH: 0
DIARRHEA: 0
CONSTIPATION: 0
VOMITING: 0
BLOOD IN STOOL: 0

## 2023-11-30 ASSESSMENT — PATIENT HEALTH QUESTIONNAIRE - PHQ9
2. FEELING DOWN, DEPRESSED OR HOPELESS: 0
SUM OF ALL RESPONSES TO PHQ QUESTIONS 1-9: 0
SUM OF ALL RESPONSES TO PHQ QUESTIONS 1-9: 0
1. LITTLE INTEREST OR PLEASURE IN DOING THINGS: 0
SUM OF ALL RESPONSES TO PHQ9 QUESTIONS 1 & 2: 0
SUM OF ALL RESPONSES TO PHQ QUESTIONS 1-9: 0
SUM OF ALL RESPONSES TO PHQ QUESTIONS 1-9: 0

## 2023-11-30 NOTE — PROGRESS NOTES
appearance. She is well-developed and normal weight. HENT:      Right Ear: Tympanic membrane, ear canal and external ear normal.      Left Ear: Tympanic membrane, ear canal and external ear normal.      Nose: Congestion and rhinorrhea present. Mouth/Throat:      Pharynx: Oropharynx is clear. No posterior oropharyngeal erythema. Eyes:      General: Scleral icterus present. Conjunctiva/sclera: Conjunctivae normal.      Pupils: Pupils are equal, round, and reactive to light. Comments: May have slight icterus   Neck:      Thyroid: No thyroid mass or thyromegaly. Vascular: No carotid bruit or JVD. Trachea: Trachea normal.   Cardiovascular:      Rate and Rhythm: Normal rate and regular rhythm. Heart sounds: Normal heart sounds. No murmur heard. Pulmonary:      Effort: Pulmonary effort is normal.      Breath sounds: Normal breath sounds. No wheezing, rhonchi or rales. Abdominal:      General: Bowel sounds are normal.      Palpations: Abdomen is soft. There is no mass. Tenderness: There is no abdominal tenderness. There is no guarding. Hernia: No hernia is present. Musculoskeletal:         General: No swelling or tenderness. Normal range of motion. Cervical back: Neck supple. No tenderness. Right lower leg: No edema. Left lower leg: No edema. Lymphadenopathy:      Cervical: No cervical adenopathy. Skin:     General: Skin is warm and dry. Capillary Refill: Capillary refill takes less than 2 seconds. Coloration: Skin is not pale. Findings: No bruising or rash. Neurological:      General: No focal deficit present. Mental Status: She is alert and oriented to person, place, and time. Sensory: No sensory deficit. Motor: No weakness or abnormal muscle tone.       Coordination: Coordination normal.      Gait: Gait normal.   Psychiatric:         Mood and Affect: Mood normal.         Behavior: Behavior normal.           Basil Esteban was seen

## 2023-12-05 DIAGNOSIS — I10 PRIMARY HYPERTENSION: Primary | ICD-10-CM

## 2023-12-05 DIAGNOSIS — E78.49 OTHER HYPERLIPIDEMIA: ICD-10-CM

## 2023-12-05 RX ORDER — LOSARTAN POTASSIUM 50 MG/1
50 TABLET ORAL DAILY
Qty: 90 TABLET | Refills: 1 | Status: SHIPPED | OUTPATIENT
Start: 2023-12-05

## 2023-12-05 RX ORDER — ROSUVASTATIN CALCIUM 10 MG/1
10 TABLET, COATED ORAL NIGHTLY
Qty: 90 TABLET | Refills: 1 | Status: SHIPPED | OUTPATIENT
Start: 2023-12-05

## 2024-02-19 ENCOUNTER — PROCEDURE VISIT (OUTPATIENT)
Dept: PODIATRY | Age: 79
End: 2024-02-19
Payer: MEDICARE

## 2024-02-19 VITALS — WEIGHT: 147 LBS | HEIGHT: 62 IN | BODY MASS INDEX: 27.05 KG/M2

## 2024-02-19 DIAGNOSIS — R26.2 DIFFICULTY WALKING: ICD-10-CM

## 2024-02-19 DIAGNOSIS — B35.1 ONYCHOMYCOSIS: Primary | ICD-10-CM

## 2024-02-19 DIAGNOSIS — E11.51 TYPE II DIABETES MELLITUS WITH PERIPHERAL CIRCULATORY DISORDER (HCC): ICD-10-CM

## 2024-02-19 DIAGNOSIS — M79.674 PAIN OF TOE OF RIGHT FOOT: ICD-10-CM

## 2024-02-19 DIAGNOSIS — I73.9 PVD (PERIPHERAL VASCULAR DISEASE) (HCC): ICD-10-CM

## 2024-02-19 DIAGNOSIS — M79.675 PAIN OF TOE OF LEFT FOOT: ICD-10-CM

## 2024-02-19 PROCEDURE — 11721 DEBRIDE NAIL 6 OR MORE: CPT | Performed by: PODIATRIST

## 2024-02-19 PROCEDURE — 99999 PR OFFICE/OUTPT VISIT,PROCEDURE ONLY: CPT | Performed by: PODIATRIST

## 2024-02-19 NOTE — PROGRESS NOTES
Patient here for nail care, arthritis in left foot. Ambrocio Cleaning MD  Last visit 11/30/2023. Electronically signed by Hailey Dunn 8:54 AM

## 2024-02-19 NOTE — PROGRESS NOTES
24     Katie Jiménez    : 1945  Sex: female  Age: 78 y.o.    Subjective:  The patient is seen today for evaluation regarding diabetic foot evaluation and mycotic nail care.  No other complaints noted.    Chief Complaint   Patient presents with    Nail Problem    Foot Pain     Nail care, arthritis in foot       Current Medications:    Current Outpatient Medications:     losartan (COZAAR) 50 MG tablet, Take 1 tablet by mouth daily, Disp: 90 tablet, Rfl: 1    rosuvastatin (CRESTOR) 10 MG tablet, Take 1 tablet by mouth nightly, Disp: 90 tablet, Rfl: 1    Multiple Vitamin (MULTIVITAMIN ADULT PO), Take by mouth, Disp: , Rfl:     pioglitazone (ACTOS) 45 MG tablet, Take 1 tablet by mouth daily, Disp: 90 tablet, Rfl: 1    amLODIPine (NORVASC) 5 MG tablet, Take 1 tablet by mouth daily, Disp: 90 tablet, Rfl: 1    celecoxib (CELEBREX) 200 MG capsule, Take 1 capsule by mouth daily (Patient taking differently: Take 1 capsule by mouth as needed), Disp: 30 capsule, Rfl: 5    glipiZIDE (GLUCOTROL) 5 MG tablet, Take 2 tablets by mouth 2 times daily, Disp: 180 tablet, Rfl: 3    acetaminophen (TYLENOL) 500 MG tablet, Take 1 tablet by mouth every 6 hours as needed for Pain, Disp: , Rfl:     Allergies:  No Known Allergies    Past Surgical History:   Procedure Laterality Date    ABDOMEN SURGERY      hysterectomy    BACK SURGERY      BACK SURGERY      3 back surgerys 3-4 year ago    BREAST BIOPSY Right     stereotactic    CARPAL TUNNEL RELEASE  2019    Left    HUMERUS FRACTURE SURGERY Right 2/15/2023    RIGHT HUMERUS OPEN REDUCTION INTERNAL FIXATION performed by Luisito Munguia DO at [facility]    HYSTERECTOMY (CERVIX STATUS UNKNOWN)      SHOULDER SURGERY      RTC, B    SPINAL FUSION      lumbar    TONSILLECTOMY       Past Medical History:   Diagnosis Date    Arthritis     Carpal tunnel syndrome     Chronic back pain     Closed fracture of distal end of right humerus with routine healing 2023    Corns and callosities

## 2024-02-25 NOTE — PROGRESS NOTES
English Patient in today for nail care. Patient does not have any complaints of pain at this time.  Patient's PCP is Elise Aviles MD date of last ov  9/7/2021       Ashleigh Malik LPN

## 2024-03-18 DIAGNOSIS — I10 PRIMARY HYPERTENSION: ICD-10-CM

## 2024-03-18 DIAGNOSIS — M15.9 PRIMARY OSTEOARTHRITIS INVOLVING MULTIPLE JOINTS: ICD-10-CM

## 2024-03-18 DIAGNOSIS — E11.69 TYPE 2 DIABETES MELLITUS WITH OTHER SPECIFIED COMPLICATION, UNSPECIFIED WHETHER LONG TERM INSULIN USE (HCC): ICD-10-CM

## 2024-03-18 RX ORDER — GLIPIZIDE 5 MG/1
10 TABLET ORAL 2 TIMES DAILY
Qty: 180 TABLET | Refills: 3 | Status: SHIPPED | OUTPATIENT
Start: 2024-03-18

## 2024-03-18 RX ORDER — CELECOXIB 200 MG/1
200 CAPSULE ORAL PRN
Qty: 90 CAPSULE | Refills: 1 | Status: SHIPPED | OUTPATIENT
Start: 2024-03-18

## 2024-03-18 RX ORDER — PIOGLITAZONEHYDROCHLORIDE 45 MG/1
45 TABLET ORAL DAILY
Qty: 90 TABLET | Refills: 1 | Status: SHIPPED | OUTPATIENT
Start: 2024-03-18

## 2024-03-18 RX ORDER — AMLODIPINE BESYLATE 5 MG/1
5 TABLET ORAL DAILY
Qty: 90 TABLET | Refills: 1 | Status: SHIPPED | OUTPATIENT
Start: 2024-03-18

## 2024-03-18 NOTE — TELEPHONE ENCOUNTER
Patient came into the office requesting refills on her medication. Patient is also requesting a handicap placement card.       Last Appointment:  11/30/2023  Future Appointments   Date Time Provider Department Center   3/25/2024  8:15 AM CONSTANCE FERNANDO RM 1 [facility]   4/22/2024  8:00 AM Deondre Monzon Jr., DPM [place] [facility]   5/30/2024  8:00 AM Ambrocio Cleaning MD [facility]

## 2024-03-25 ENCOUNTER — HOSPITAL ENCOUNTER (OUTPATIENT)
Dept: GENERAL RADIOLOGY | Age: 79
Discharge: HOME | End: 2024-03-27
Payer: MEDICARE

## 2024-03-25 VITALS — BODY MASS INDEX: 27.23 KG/M2 | WEIGHT: 148 LBS | HEIGHT: 62 IN

## 2024-03-25 DIAGNOSIS — Z12.31 VISIT FOR SCREENING MAMMOGRAM: ICD-10-CM

## 2024-03-25 PROCEDURE — 77063 BREAST TOMOSYNTHESIS BI: CPT

## 2024-03-28 ENCOUNTER — TELEPHONE (OUTPATIENT)
Dept: FAMILY MEDICINE CLINIC | Age: 79
End: 2024-03-28

## 2024-03-28 DIAGNOSIS — E11.22 TYPE 2 DIABETES MELLITUS WITH STAGE 3B CHRONIC KIDNEY DISEASE, WITHOUT LONG-TERM CURRENT USE OF INSULIN (HCC): Primary | ICD-10-CM

## 2024-03-28 DIAGNOSIS — N18.32 TYPE 2 DIABETES MELLITUS WITH STAGE 3B CHRONIC KIDNEY DISEASE, WITHOUT LONG-TERM CURRENT USE OF INSULIN (HCC): Primary | ICD-10-CM

## 2024-03-28 RX ORDER — GLUCOSAMINE HCL/CHONDROITIN SU 500-400 MG
1 CAPSULE ORAL DAILY
Qty: 100 STRIP | Refills: 3 | Status: SHIPPED | OUTPATIENT
Start: 2024-03-28

## 2024-03-28 RX ORDER — LANCETS 30 GAUGE
1 EACH MISCELLANEOUS DAILY
Qty: 100 EACH | Refills: 3 | Status: SHIPPED | OUTPATIENT
Start: 2024-03-28

## 2024-03-28 NOTE — TELEPHONE ENCOUNTER
Katie called to request new scripts for a glucometer and testing supplies. She believes she left hers on the cruise ship.     Prescriptions are ready to be signed.

## 2024-04-22 ENCOUNTER — PROCEDURE VISIT (OUTPATIENT)
Dept: PODIATRY | Age: 79
End: 2024-04-22
Payer: MEDICARE

## 2024-04-22 VITALS — HEIGHT: 62 IN | WEIGHT: 150 LBS | BODY MASS INDEX: 27.6 KG/M2

## 2024-04-22 DIAGNOSIS — I87.2 VENOUS INSUFFICIENCY (CHRONIC) (PERIPHERAL): ICD-10-CM

## 2024-04-22 DIAGNOSIS — M79.674 PAIN OF TOE OF RIGHT FOOT: ICD-10-CM

## 2024-04-22 DIAGNOSIS — M79.675 PAIN OF TOE OF LEFT FOOT: ICD-10-CM

## 2024-04-22 DIAGNOSIS — E11.51 TYPE II DIABETES MELLITUS WITH PERIPHERAL CIRCULATORY DISORDER (HCC): ICD-10-CM

## 2024-04-22 DIAGNOSIS — B35.1 ONYCHOMYCOSIS: Primary | ICD-10-CM

## 2024-04-22 DIAGNOSIS — I73.9 PVD (PERIPHERAL VASCULAR DISEASE) (HCC): ICD-10-CM

## 2024-04-22 DIAGNOSIS — R26.2 DIFFICULTY WALKING: ICD-10-CM

## 2024-04-22 PROCEDURE — 11721 DEBRIDE NAIL 6 OR MORE: CPT | Performed by: PODIATRIST

## 2024-04-22 PROCEDURE — 99999 PR OFFICE/OUTPT VISIT,PROCEDURE ONLY: CPT | Performed by: PODIATRIST

## 2024-04-22 NOTE — PROGRESS NOTES
24     Katie Jiménez    : 1945  Sex: female  Age: 78 y.o.    Subjective:  The patient is seen today for evaluation regarding diabetic foot evaluation and mycotic nail care.  No other complaints noted.    Chief Complaint   Patient presents with    Nail Problem     Nail care       Current Medications:    Current Outpatient Medications:     blood glucose monitor kit and supplies, Dispense sufficient amount for once a day testing. Dispense all needed supplies to include: monitor, strips, lancing device, lancets, control solutions, alcohol swabs., Disp: 1 kit, Rfl: 0    blood glucose monitor strips, 1 strip by Other route daily, Disp: 100 strip, Rfl: 3    Lancets MISC, 1 each by Does not apply route daily, Disp: 100 each, Rfl: 3    amLODIPine (NORVASC) 5 MG tablet, Take 1 tablet by mouth daily, Disp: 90 tablet, Rfl: 1    glipiZIDE (GLUCOTROL) 5 MG tablet, Take 2 tablets by mouth 2 times daily, Disp: 180 tablet, Rfl: 3    pioglitazone (ACTOS) 45 MG tablet, Take 1 tablet by mouth daily, Disp: 90 tablet, Rfl: 1    celecoxib (CELEBREX) 200 MG capsule, Take 1 capsule by mouth as needed for Pain, Disp: 90 capsule, Rfl: 1    Handicap Placard MISC, by Does not apply route 5 years, Disp: 1 each, Rfl: 0    losartan (COZAAR) 50 MG tablet, Take 1 tablet by mouth daily, Disp: 90 tablet, Rfl: 1    rosuvastatin (CRESTOR) 10 MG tablet, Take 1 tablet by mouth nightly, Disp: 90 tablet, Rfl: 1    Multiple Vitamin (MULTIVITAMIN ADULT PO), Take by mouth, Disp: , Rfl:     acetaminophen (TYLENOL) 500 MG tablet, Take 1 tablet by mouth every 6 hours as needed for Pain, Disp: , Rfl:     Allergies:  No Known Allergies    Past Surgical History:   Procedure Laterality Date    ABDOMEN SURGERY      hysterectomy    BACK SURGERY      BACK SURGERY      3 back surgerys 3-4 year ago    BREAST BIOPSY Right     stereotactic    CARPAL TUNNEL RELEASE  2019    Left    HUMERUS FRACTURE SURGERY Right 2/15/2023    RIGHT HUMERUS OPEN REDUCTION

## 2024-04-22 NOTE — PROGRESS NOTES
Patient here for nail care. Patient complain of left foot arthritis. Ambrocio Cleaning MD  Last visit 11/30/2023.  Electronically signed by Lizeth Stanton LPN on 4/22/2024 at 8:04 AM

## 2024-05-30 ENCOUNTER — OFFICE VISIT (OUTPATIENT)
Dept: FAMILY MEDICINE CLINIC | Age: 79
End: 2024-05-30

## 2024-05-30 VITALS
HEART RATE: 76 BPM | OXYGEN SATURATION: 97 % | TEMPERATURE: 97.3 F | HEIGHT: 62 IN | RESPIRATION RATE: 18 BRPM | SYSTOLIC BLOOD PRESSURE: 150 MMHG | DIASTOLIC BLOOD PRESSURE: 82 MMHG | WEIGHT: 157.2 LBS | BODY MASS INDEX: 28.93 KG/M2

## 2024-05-30 DIAGNOSIS — E11.69 TYPE 2 DIABETES MELLITUS WITH OTHER SPECIFIED COMPLICATION, UNSPECIFIED WHETHER LONG TERM INSULIN USE (HCC): Primary | ICD-10-CM

## 2024-05-30 DIAGNOSIS — N18.32 TYPE 2 DIABETES MELLITUS WITH STAGE 3B CHRONIC KIDNEY DISEASE, WITHOUT LONG-TERM CURRENT USE OF INSULIN (HCC): ICD-10-CM

## 2024-05-30 DIAGNOSIS — E11.22 TYPE 2 DIABETES MELLITUS WITH STAGE 3B CHRONIC KIDNEY DISEASE, WITHOUT LONG-TERM CURRENT USE OF INSULIN (HCC): ICD-10-CM

## 2024-05-30 DIAGNOSIS — S06.5XAA SUBDURAL HEMATOMA (HCC): ICD-10-CM

## 2024-05-30 DIAGNOSIS — I10 PRIMARY HYPERTENSION: ICD-10-CM

## 2024-05-30 DIAGNOSIS — E78.49 OTHER HYPERLIPIDEMIA: ICD-10-CM

## 2024-05-30 LAB — HBA1C MFR BLD: 6.4 %

## 2024-05-30 RX ORDER — LANCETS 30 GAUGE
1 EACH MISCELLANEOUS DAILY
Qty: 100 EACH | Refills: 3 | Status: SHIPPED | OUTPATIENT
Start: 2024-05-30

## 2024-05-30 RX ORDER — AMLODIPINE BESYLATE 5 MG/1
5 TABLET ORAL DAILY
Qty: 90 TABLET | Refills: 1 | Status: SHIPPED | OUTPATIENT
Start: 2024-05-30

## 2024-05-30 RX ORDER — PIOGLITAZONEHYDROCHLORIDE 45 MG/1
45 TABLET ORAL DAILY
Qty: 90 TABLET | Refills: 1 | Status: SHIPPED | OUTPATIENT
Start: 2024-05-30

## 2024-05-30 RX ORDER — ROSUVASTATIN CALCIUM 10 MG/1
10 TABLET, COATED ORAL NIGHTLY
Qty: 90 TABLET | Refills: 1 | Status: SHIPPED | OUTPATIENT
Start: 2024-05-30

## 2024-05-30 RX ORDER — LOSARTAN POTASSIUM AND HYDROCHLOROTHIAZIDE 12.5; 5 MG/1; MG/1
1 TABLET ORAL DAILY
Qty: 90 TABLET | Refills: 1 | Status: SHIPPED | OUTPATIENT
Start: 2024-05-30

## 2024-05-30 RX ORDER — LOSARTAN POTASSIUM 50 MG/1
50 TABLET ORAL DAILY
Qty: 90 TABLET | Refills: 1 | Status: CANCELLED | OUTPATIENT
Start: 2024-05-30

## 2024-05-30 SDOH — ECONOMIC STABILITY: FOOD INSECURITY: WITHIN THE PAST 12 MONTHS, THE FOOD YOU BOUGHT JUST DIDN'T LAST AND YOU DIDN'T HAVE MONEY TO GET MORE.: NEVER TRUE

## 2024-05-30 SDOH — ECONOMIC STABILITY: FOOD INSECURITY: WITHIN THE PAST 12 MONTHS, YOU WORRIED THAT YOUR FOOD WOULD RUN OUT BEFORE YOU GOT MONEY TO BUY MORE.: NEVER TRUE

## 2024-05-30 SDOH — ECONOMIC STABILITY: INCOME INSECURITY: HOW HARD IS IT FOR YOU TO PAY FOR THE VERY BASICS LIKE FOOD, HOUSING, MEDICAL CARE, AND HEATING?: NOT HARD AT ALL

## 2024-05-30 ASSESSMENT — ENCOUNTER SYMPTOMS
VOMITING: 0
SHORTNESS OF BREATH: 0
CHEST TIGHTNESS: 0
PHOTOPHOBIA: 0
DIARRHEA: 0
BLOOD IN STOOL: 0
COUGH: 0
WHEEZING: 0
EYE REDNESS: 0
ABDOMINAL PAIN: 0
CONSTIPATION: 0
SORE THROAT: 0
EYES NEGATIVE: 1

## 2024-05-30 ASSESSMENT — PATIENT HEALTH QUESTIONNAIRE - PHQ9
SUM OF ALL RESPONSES TO PHQ QUESTIONS 1-9: 0
1. LITTLE INTEREST OR PLEASURE IN DOING THINGS: NOT AT ALL
SUM OF ALL RESPONSES TO PHQ QUESTIONS 1-9: 0
SUM OF ALL RESPONSES TO PHQ9 QUESTIONS 1 & 2: 0
2. FEELING DOWN, DEPRESSED OR HOPELESS: NOT AT ALL

## 2024-05-30 NOTE — PROGRESS NOTES
RELEASE  2019    Left    HUMERUS FRACTURE SURGERY Right 2/15/2023    RIGHT HUMERUS OPEN REDUCTION INTERNAL FIXATION performed by Luisito Munguia DO at [facility]    HYSTERECTOMY (CERVIX STATUS UNKNOWN)      SHOULDER SURGERY      RTC, B    SPINAL FUSION      lumbar    TONSILLECTOMY       No family history on file.  Social History       Tobacco History       Smoking Status  Never      Smokeless Tobacco Use  Never              Alcohol History       Alcohol Use Status  No              Drug Use       Drug Use Status  No              Sexual Activity       Sexually Active  Not Currently Partners  Male Birth Control/Protection  Post-menopausal                    OBJECTIVE  Vitals:    05/30/24 0801   BP: (!) 150/82   Pulse: 76   Resp: 18   Temp: 97.3 °F (36.3 °C)   TempSrc: Temporal   SpO2: 97%   Weight: 71.3 kg (157 lb 3.2 oz)   Height: 1.575 m (5' 2\")        Body mass index is 28.75 kg/m².    Orders Placed This Encounter   Procedures    POCT glycosylated hemoglobin (Hb A1C)       EXAM   Physical Exam  Vitals and nursing note reviewed.   Constitutional:       Appearance: Normal appearance. She is well-developed and normal weight.   HENT:      Right Ear: Tympanic membrane, ear canal and external ear normal.      Left Ear: Tympanic membrane, ear canal and external ear normal.      Nose: Congestion and rhinorrhea present.      Mouth/Throat:      Pharynx: Oropharynx is clear. No posterior oropharyngeal erythema.   Eyes:      General: No scleral icterus.     Conjunctiva/sclera: Conjunctivae normal.      Pupils: Pupils are equal, round, and reactive to light.      Comments: May have slight icterus   Neck:      Thyroid: No thyroid mass or thyromegaly.      Vascular: No carotid bruit or JVD.      Trachea: Trachea normal.   Cardiovascular:      Rate and Rhythm: Normal rate and regular rhythm.      Heart sounds: Normal heart sounds. No murmur heard.     No gallop.   Pulmonary:      Effort: Pulmonary effort is normal.      Breath

## 2024-07-26 ENCOUNTER — PROCEDURE VISIT (OUTPATIENT)
Dept: PODIATRY | Age: 79
End: 2024-07-26
Payer: MEDICARE

## 2024-07-26 VITALS — HEIGHT: 62 IN | BODY MASS INDEX: 28.89 KG/M2 | WEIGHT: 157 LBS

## 2024-07-26 DIAGNOSIS — M79.674 PAIN OF TOE OF RIGHT FOOT: ICD-10-CM

## 2024-07-26 DIAGNOSIS — I73.9 PVD (PERIPHERAL VASCULAR DISEASE) (HCC): ICD-10-CM

## 2024-07-26 DIAGNOSIS — B35.1 ONYCHOMYCOSIS: Primary | ICD-10-CM

## 2024-07-26 DIAGNOSIS — I87.2 VENOUS INSUFFICIENCY (CHRONIC) (PERIPHERAL): ICD-10-CM

## 2024-07-26 DIAGNOSIS — R26.2 DIFFICULTY WALKING: ICD-10-CM

## 2024-07-26 DIAGNOSIS — E11.51 TYPE II DIABETES MELLITUS WITH PERIPHERAL CIRCULATORY DISORDER (HCC): ICD-10-CM

## 2024-07-26 DIAGNOSIS — M79.675 PAIN OF TOE OF LEFT FOOT: ICD-10-CM

## 2024-07-26 PROCEDURE — 11721 DEBRIDE NAIL 6 OR MORE: CPT | Performed by: PODIATRIST

## 2024-07-26 PROCEDURE — 99999 PR OFFICE/OUTPT VISIT,PROCEDURE ONLY: CPT | Performed by: PODIATRIST

## 2024-07-26 NOTE — PROGRESS NOTES
Patient in today for nail care. Patient does not have any complaints of pain at this time. Patient's PCP is Ambrocio Cleaning MD date of last ov 5/30/24          Maria Fernanda Holliday LPN     
Manual and electrical debridement of the mycotic nails was performed for thickness and length to prevent injection and/or ulceration.  3. Discussed additional diabetic lower extremity care techniques with patient today.  4. We did discuss importance of shoe gear and foot inspection to prevent potential issues.  5. We will see the patient back at a later date for continued podiatric management and care.  Patient was advised to call the office with any questions or concerns prior to their next appointment if needed.        Seen By:    Deondre Monzon Jr, DPM    Electronically signed by Deondre Monzon Jr, DPM on 7/26/2024 at 9:38 AM      This note was created using voice recognition software.  The note was reviewed however may contain grammatical errors.

## 2024-09-19 ENCOUNTER — TELEPHONE (OUTPATIENT)
Dept: FAMILY MEDICINE CLINIC | Age: 79
End: 2024-09-19

## 2024-09-24 ENCOUNTER — OFFICE VISIT (OUTPATIENT)
Dept: FAMILY MEDICINE CLINIC | Age: 79
End: 2024-09-24

## 2024-09-24 VITALS
HEART RATE: 76 BPM | WEIGHT: 156 LBS | RESPIRATION RATE: 17 BRPM | HEIGHT: 62 IN | BODY MASS INDEX: 28.71 KG/M2 | TEMPERATURE: 97 F | OXYGEN SATURATION: 98 % | SYSTOLIC BLOOD PRESSURE: 120 MMHG | DIASTOLIC BLOOD PRESSURE: 80 MMHG

## 2024-09-24 DIAGNOSIS — E11.69 TYPE 2 DIABETES MELLITUS WITH OTHER SPECIFIED COMPLICATION, UNSPECIFIED WHETHER LONG TERM INSULIN USE (HCC): ICD-10-CM

## 2024-09-24 DIAGNOSIS — E11.22 TYPE 2 DIABETES MELLITUS WITH STAGE 3B CHRONIC KIDNEY DISEASE, WITHOUT LONG-TERM CURRENT USE OF INSULIN (HCC): ICD-10-CM

## 2024-09-24 DIAGNOSIS — I10 PRIMARY HYPERTENSION: ICD-10-CM

## 2024-09-24 DIAGNOSIS — Z01.818 PRE-OP TESTING: Primary | ICD-10-CM

## 2024-09-24 DIAGNOSIS — N18.32 TYPE 2 DIABETES MELLITUS WITH STAGE 3B CHRONIC KIDNEY DISEASE, WITHOUT LONG-TERM CURRENT USE OF INSULIN (HCC): ICD-10-CM

## 2024-09-24 DIAGNOSIS — E78.49 OTHER HYPERLIPIDEMIA: ICD-10-CM

## 2024-09-24 LAB
ALBUMIN: 4.2 G/DL (ref 3.5–5.2)
ALP BLD-CCNC: 59 U/L (ref 35–104)
ALT SERPL-CCNC: 13 U/L (ref 0–32)
ANION GAP SERPL CALCULATED.3IONS-SCNC: 12 MMOL/L (ref 7–16)
AST SERPL-CCNC: 16 U/L (ref 0–31)
BASOPHILS ABSOLUTE: 0.03 K/UL (ref 0–0.2)
BASOPHILS RELATIVE PERCENT: 1 % (ref 0–2)
BILIRUB SERPL-MCNC: 0.2 MG/DL (ref 0–1.2)
BUN BLDV-MCNC: 31 MG/DL (ref 6–23)
CALCIUM SERPL-MCNC: 9 MG/DL (ref 8.6–10.2)
CHLORIDE BLD-SCNC: 109 MMOL/L (ref 98–107)
CO2: 21 MMOL/L (ref 22–29)
CREAT SERPL-MCNC: 1.1 MG/DL (ref 0.5–1)
EOSINOPHILS ABSOLUTE: 0.14 K/UL (ref 0.05–0.5)
EOSINOPHILS RELATIVE PERCENT: 3 % (ref 0–6)
GFR, ESTIMATED: 54 ML/MIN/1.73M2
GLUCOSE BLD-MCNC: 96 MG/DL (ref 74–99)
HBA1C MFR BLD: 7.4 % (ref 4–5.6)
HCT VFR BLD CALC: 35.8 % (ref 34–48)
HEMOGLOBIN: 11.2 G/DL (ref 11.5–15.5)
IMMATURE GRANULOCYTES %: 0 % (ref 0–5)
IMMATURE GRANULOCYTES ABSOLUTE: <0.03 K/UL (ref 0–0.58)
LYMPHOCYTES ABSOLUTE: 1.25 K/UL (ref 1.5–4)
LYMPHOCYTES RELATIVE PERCENT: 26 % (ref 20–42)
MAGNESIUM: 2 MG/DL (ref 1.6–2.6)
MCH RBC QN AUTO: 32.6 PG (ref 26–35)
MCHC RBC AUTO-ENTMCNC: 31.3 G/DL (ref 32–34.5)
MCV RBC AUTO: 104.1 FL (ref 80–99.9)
MONOCYTES ABSOLUTE: 0.39 K/UL (ref 0.1–0.95)
MONOCYTES RELATIVE PERCENT: 8 % (ref 2–12)
NEUTROPHILS ABSOLUTE: 2.94 K/UL (ref 1.8–7.3)
NEUTROPHILS RELATIVE PERCENT: 62 % (ref 43–80)
PDW BLD-RTO: 13.2 % (ref 11.5–15)
PLATELET # BLD: 202 K/UL (ref 130–450)
PMV BLD AUTO: 10 FL (ref 7–12)
POTASSIUM SERPL-SCNC: 4.6 MMOL/L (ref 3.5–5)
RBC # BLD: 3.44 M/UL (ref 3.5–5.5)
SODIUM BLD-SCNC: 142 MMOL/L (ref 132–146)
TOTAL PROTEIN: 6.7 G/DL (ref 6.4–8.3)
URIC ACID: 4.8 MG/DL (ref 2.4–5.7)
WBC # BLD: 4.8 K/UL (ref 4.5–11.5)

## 2024-09-24 RX ORDER — GLUCOSAMINE SULFATE 500 MG
CAPSULE ORAL
COMMUNITY

## 2024-09-24 RX ORDER — GLUCOSAMINE HCL/CHONDROITIN SU 500-400 MG
1 CAPSULE ORAL DAILY
Qty: 100 STRIP | Refills: 3 | Status: SHIPPED | OUTPATIENT
Start: 2024-09-24

## 2024-09-24 RX ORDER — GLIPIZIDE 5 MG/1
10 TABLET ORAL 2 TIMES DAILY
Qty: 180 TABLET | Refills: 3 | Status: SHIPPED | OUTPATIENT
Start: 2024-09-24

## 2024-09-24 RX ORDER — LANCETS 30 GAUGE
1 EACH MISCELLANEOUS DAILY
Qty: 100 EACH | Refills: 3 | Status: SHIPPED | OUTPATIENT
Start: 2024-09-24

## 2024-09-24 RX ORDER — MULTIVIT-MIN/IRON/FOLIC ACID/K 18-600-40
CAPSULE ORAL
COMMUNITY

## 2024-09-24 RX ORDER — LOSARTAN POTASSIUM AND HYDROCHLOROTHIAZIDE 12.5; 5 MG/1; MG/1
1 TABLET ORAL DAILY
Qty: 90 TABLET | Refills: 1 | Status: SHIPPED | OUTPATIENT
Start: 2024-09-24

## 2024-09-24 RX ORDER — AMLODIPINE BESYLATE 5 MG/1
5 TABLET ORAL DAILY
Qty: 90 TABLET | Refills: 1 | Status: SHIPPED | OUTPATIENT
Start: 2024-09-24

## 2024-09-24 RX ORDER — PIOGLITAZONEHYDROCHLORIDE 45 MG/1
45 TABLET ORAL DAILY
Qty: 90 TABLET | Refills: 1 | Status: CANCELLED | OUTPATIENT
Start: 2024-09-24

## 2024-09-24 RX ORDER — ROSUVASTATIN CALCIUM 10 MG/1
10 TABLET, COATED ORAL NIGHTLY
Qty: 90 TABLET | Refills: 1 | Status: SHIPPED | OUTPATIENT
Start: 2024-09-24

## 2024-09-27 DIAGNOSIS — E11.69 TYPE 2 DIABETES MELLITUS WITH OTHER SPECIFIED COMPLICATION, UNSPECIFIED WHETHER LONG TERM INSULIN USE (HCC): ICD-10-CM

## 2024-09-30 RX ORDER — PIOGLITAZONE 45 MG/1
45 TABLET ORAL DAILY
Qty: 90 TABLET | Refills: 1 | OUTPATIENT
Start: 2024-09-30

## 2024-11-27 DIAGNOSIS — E11.69 TYPE 2 DIABETES MELLITUS WITH OTHER SPECIFIED COMPLICATION, UNSPECIFIED WHETHER LONG TERM INSULIN USE (HCC): ICD-10-CM

## 2024-11-27 RX ORDER — PIOGLITAZONE 45 MG/1
45 TABLET ORAL DAILY
Qty: 90 TABLET | Refills: 1 | Status: SHIPPED | OUTPATIENT
Start: 2024-11-27

## 2024-11-27 NOTE — TELEPHONE ENCOUNTER
Last Appointment:  9/24/2024  Future Appointments   Date Time Provider Department Center   12/2/2024  8:15 AM Ambrocio Cleaning MD [facility]      Patient will be out of this before seeing you.

## 2024-12-02 ENCOUNTER — OFFICE VISIT (OUTPATIENT)
Dept: FAMILY MEDICINE CLINIC | Age: 79
End: 2024-12-02

## 2024-12-02 VITALS
BODY MASS INDEX: 27.97 KG/M2 | HEART RATE: 73 BPM | TEMPERATURE: 97.8 F | SYSTOLIC BLOOD PRESSURE: 130 MMHG | OXYGEN SATURATION: 98 % | WEIGHT: 152 LBS | HEIGHT: 62 IN | DIASTOLIC BLOOD PRESSURE: 64 MMHG

## 2024-12-02 DIAGNOSIS — N18.31 STAGE 3A CHRONIC KIDNEY DISEASE (HCC): ICD-10-CM

## 2024-12-02 DIAGNOSIS — I10 PRIMARY HYPERTENSION: ICD-10-CM

## 2024-12-02 DIAGNOSIS — R25.2 LEG CRAMPS: ICD-10-CM

## 2024-12-02 DIAGNOSIS — M51.16 LUMBAR DISC DISEASE WITH RADICULOPATHY: ICD-10-CM

## 2024-12-02 DIAGNOSIS — E78.49 OTHER HYPERLIPIDEMIA: ICD-10-CM

## 2024-12-02 DIAGNOSIS — Z00.00 MEDICARE ANNUAL WELLNESS VISIT, SUBSEQUENT: Primary | ICD-10-CM

## 2024-12-02 DIAGNOSIS — N18.32 TYPE 2 DIABETES MELLITUS WITH STAGE 3B CHRONIC KIDNEY DISEASE, WITHOUT LONG-TERM CURRENT USE OF INSULIN (HCC): ICD-10-CM

## 2024-12-02 DIAGNOSIS — E11.22 TYPE 2 DIABETES MELLITUS WITH STAGE 3B CHRONIC KIDNEY DISEASE, WITHOUT LONG-TERM CURRENT USE OF INSULIN (HCC): ICD-10-CM

## 2024-12-02 DIAGNOSIS — Z12.31 ENCOUNTER FOR SCREENING MAMMOGRAM FOR MALIGNANT NEOPLASM OF BREAST: ICD-10-CM

## 2024-12-02 LAB
ALBUMIN: 4.4 G/DL (ref 3.5–5.2)
ALP BLD-CCNC: 63 U/L (ref 35–104)
ALT SERPL-CCNC: 14 U/L (ref 0–32)
ANION GAP SERPL CALCULATED.3IONS-SCNC: 10 MMOL/L (ref 7–16)
AST SERPL-CCNC: 20 U/L (ref 0–31)
BILIRUB SERPL-MCNC: 0.3 MG/DL (ref 0–1.2)
BUN BLDV-MCNC: 34 MG/DL (ref 6–23)
CALCIUM SERPL-MCNC: 9.9 MG/DL (ref 8.6–10.2)
CHLORIDE BLD-SCNC: 109 MMOL/L (ref 98–107)
CHOLESTEROL, TOTAL: 194 MG/DL
CO2: 24 MMOL/L (ref 22–29)
CREAT SERPL-MCNC: 1.3 MG/DL (ref 0.5–1)
CREATININE URINE: 73.6 MG/DL (ref 29–226)
GFR, ESTIMATED: 44 ML/MIN/1.73M2
GLUCOSE BLD-MCNC: 109 MG/DL (ref 74–99)
HDLC SERPL-MCNC: 78 MG/DL
LDL CHOLESTEROL: 96 MG/DL
MICROALBUMIN/CREAT 24H UR: 108 MG/L (ref 0–19)
MICROALBUMIN/CREAT UR-RTO: 147 MCG/MG CREAT (ref 0–30)
POTASSIUM SERPL-SCNC: 4.8 MMOL/L (ref 3.5–5)
SODIUM BLD-SCNC: 143 MMOL/L (ref 132–146)
TOTAL CK: 197 U/L (ref 20–180)
TOTAL PROTEIN: 7.1 G/DL (ref 6.4–8.3)
TRIGL SERPL-MCNC: 102 MG/DL
VLDLC SERPL CALC-MCNC: 20 MG/DL

## 2024-12-02 ASSESSMENT — ENCOUNTER SYMPTOMS
WHEEZING: 0
PHOTOPHOBIA: 0
ABDOMINAL PAIN: 0
CHEST TIGHTNESS: 0
BLOOD IN STOOL: 0
CONSTIPATION: 0
TROUBLE SWALLOWING: 0
VOMITING: 0
SORE THROAT: 0
COLOR CHANGE: 0
EYES NEGATIVE: 1
SHORTNESS OF BREATH: 0
BACK PAIN: 1
SINUS PRESSURE: 1
DIARRHEA: 0
EYE REDNESS: 0
COUGH: 0

## 2024-12-02 ASSESSMENT — PATIENT HEALTH QUESTIONNAIRE - PHQ9
SUM OF ALL RESPONSES TO PHQ QUESTIONS 1-9: 0
SUM OF ALL RESPONSES TO PHQ QUESTIONS 1-9: 0
DEPRESSION UNABLE TO ASSESS: PT REFUSES
SUM OF ALL RESPONSES TO PHQ QUESTIONS 1-9: 0
SUM OF ALL RESPONSES TO PHQ QUESTIONS 1-9: 0
SUM OF ALL RESPONSES TO PHQ9 QUESTIONS 1 & 2: 0
1. LITTLE INTEREST OR PLEASURE IN DOING THINGS: NOT AT ALL
2. FEELING DOWN, DEPRESSED OR HOPELESS: NOT AT ALL

## 2024-12-02 ASSESSMENT — LIFESTYLE VARIABLES
HOW OFTEN DO YOU HAVE A DRINK CONTAINING ALCOHOL: MONTHLY OR LESS
HOW MANY STANDARD DRINKS CONTAINING ALCOHOL DO YOU HAVE ON A TYPICAL DAY: 1 OR 2

## 2024-12-02 NOTE — PATIENT INSTRUCTIONS
The [employer] [place] [employer] online.  You need 8845-7925 mg of calcium and 9640-6368 IU of vitamin D per day. It is possible to meet your calcium requirement with diet alone, but a vitamin D supplement is usually necessary to meet this goal.  When exposed to the sun, use a sunscreen that protects against both UVA and UVB radiation with an SPF of 30 or greater. Reapply every 2 to 3 hours or after sweating, drying off with a towel, or swimming.  Always wear a seat belt when traveling in a car. Always wear a helmet when riding a bicycle or motorcycle.

## 2024-12-02 NOTE — PROGRESS NOTES
Peripheral vascular disease (HCC)     Sciatica     Type 2 diabetes mellitus without complication (HCC)      Past Surgical History:   Procedure Laterality Date    ABDOMEN SURGERY      hysterectomy    BACK SURGERY      BACK SURGERY      3 back surgerys 3-4 year ago    BREAST BIOPSY Right     stereotactic    CARPAL TUNNEL RELEASE  2019    Left    HUMERUS FRACTURE SURGERY Right 2/15/2023    RIGHT HUMERUS OPEN REDUCTION INTERNAL FIXATION performed by Luisito Munguia DO at [facility]    HYSTERECTOMY (CERVIX STATUS UNKNOWN)      SHOULDER SURGERY      RTC, B    SPINAL FUSION      lumbar    TONSILLECTOMY       No family history on file.  Social History       Tobacco History       Smoking Status  Never      Smokeless Tobacco Use  Never              Alcohol History       Alcohol Use Status  No              Drug Use       Drug Use Status  No              Sexual Activity       Sexually Active  Not Currently Partners  Male Birth Control/Protection  Post-menopausal                    OBJECTIVE  Vitals:    12/02/24 0816   BP: 130/64   Pulse: 73   Temp: 97.8 °F (36.6 °C)   SpO2: 98%   Weight: 68.9 kg (152 lb)   Height: 1.575 m (5' 2\")        Body mass index is 27.8 kg/m².    Orders Placed This Encounter   Procedures    KASSIE DIGITAL SCREEN BILATERAL PER PROTOCOL     Further imaging can be completed per [facility] protocol     Standing Status:   Future     Standing Expiration Date:   2/2/2026     Scheduling Instructions:      To schedule at [facility], call [phone].      To schedule at other [facility] locations, call central scheduling at [phone].     Order Specific Question:   Where will the exam be performed?     Answer:   [facility]    Microalbumin, Ur     Standing Status:   Future     Number of Occurrences:   1     Standing Expiration Date:   12/2/2025    Lipid Panel     Standing Status:   Future     Number of Occurrences:   1     Standing Expiration Date:   12/2/2025    [facility]     Standing

## 2024-12-04 RX ORDER — LOSARTAN POTASSIUM AND HYDROCHLOROTHIAZIDE 25; 100 MG/1; MG/1
1 TABLET ORAL DAILY
Qty: 90 TABLET | Refills: 1 | Status: SHIPPED | OUTPATIENT
Start: 2024-12-04

## 2024-12-04 NOTE — RESULT ENCOUNTER NOTE
Will start higher dose losartan/HCTZ, can take 2 of what she has and hold amlodipine. The one she picks up at pharmacy will be double strength and can take one per day

## 2025-04-14 LAB — DIABETIC RETINOPATHY: NEGATIVE

## 2025-04-15 ENCOUNTER — HOSPITAL ENCOUNTER (OUTPATIENT)
Dept: GENERAL RADIOLOGY | Age: 80
Discharge: HOME | End: 2025-04-17
Payer: MEDICARE

## 2025-04-15 ENCOUNTER — RESULTS FOLLOW-UP (OUTPATIENT)
Dept: FAMILY MEDICINE CLINIC | Age: 80
End: 2025-04-15

## 2025-04-15 VITALS — HEIGHT: 62 IN | WEIGHT: 152 LBS | BODY MASS INDEX: 27.97 KG/M2

## 2025-04-15 DIAGNOSIS — Z12.31 ENCOUNTER FOR SCREENING MAMMOGRAM FOR MALIGNANT NEOPLASM OF BREAST: ICD-10-CM

## 2025-04-15 PROCEDURE — 77067 SCR MAMMO BI INCL CAD: CPT

## 2025-06-03 ENCOUNTER — OFFICE VISIT (OUTPATIENT)
Dept: FAMILY MEDICINE CLINIC | Age: 80
End: 2025-06-03
Payer: MEDICARE

## 2025-06-03 VITALS
SYSTOLIC BLOOD PRESSURE: 134 MMHG | DIASTOLIC BLOOD PRESSURE: 82 MMHG | OXYGEN SATURATION: 96 % | TEMPERATURE: 97.4 F | BODY MASS INDEX: 28.52 KG/M2 | RESPIRATION RATE: 17 BRPM | WEIGHT: 155 LBS | HEIGHT: 62 IN | HEART RATE: 85 BPM

## 2025-06-03 DIAGNOSIS — E78.49 OTHER HYPERLIPIDEMIA: ICD-10-CM

## 2025-06-03 DIAGNOSIS — I10 PRIMARY HYPERTENSION: ICD-10-CM

## 2025-06-03 DIAGNOSIS — E11.22 TYPE 2 DIABETES MELLITUS WITH STAGE 3B CHRONIC KIDNEY DISEASE, WITHOUT LONG-TERM CURRENT USE OF INSULIN (HCC): ICD-10-CM

## 2025-06-03 DIAGNOSIS — E11.69 TYPE 2 DIABETES MELLITUS WITH OTHER SPECIFIED COMPLICATION, UNSPECIFIED WHETHER LONG TERM INSULIN USE (HCC): Primary | ICD-10-CM

## 2025-06-03 DIAGNOSIS — N18.32 TYPE 2 DIABETES MELLITUS WITH STAGE 3B CHRONIC KIDNEY DISEASE, WITHOUT LONG-TERM CURRENT USE OF INSULIN (HCC): ICD-10-CM

## 2025-06-03 DIAGNOSIS — S06.5XAA SUBDURAL HEMATOMA (HCC): ICD-10-CM

## 2025-06-03 LAB — HBA1C MFR BLD: 7.1 %

## 2025-06-03 PROCEDURE — 3051F HG A1C>EQUAL 7.0%<8.0%: CPT | Performed by: FAMILY MEDICINE

## 2025-06-03 PROCEDURE — 1090F PRES/ABSN URINE INCON ASSESS: CPT | Performed by: FAMILY MEDICINE

## 2025-06-03 PROCEDURE — 99214 OFFICE O/P EST MOD 30 MIN: CPT | Performed by: FAMILY MEDICINE

## 2025-06-03 PROCEDURE — 1036F TOBACCO NON-USER: CPT | Performed by: FAMILY MEDICINE

## 2025-06-03 PROCEDURE — G8427 DOCREV CUR MEDS BY ELIG CLIN: HCPCS | Performed by: FAMILY MEDICINE

## 2025-06-03 PROCEDURE — 83036 HEMOGLOBIN GLYCOSYLATED A1C: CPT | Performed by: FAMILY MEDICINE

## 2025-06-03 PROCEDURE — 1123F ACP DISCUSS/DSCN MKR DOCD: CPT | Performed by: FAMILY MEDICINE

## 2025-06-03 PROCEDURE — 1159F MED LIST DOCD IN RCRD: CPT | Performed by: FAMILY MEDICINE

## 2025-06-03 PROCEDURE — G8399 PT W/DXA RESULTS DOCUMENT: HCPCS | Performed by: FAMILY MEDICINE

## 2025-06-03 PROCEDURE — 3075F SYST BP GE 130 - 139MM HG: CPT | Performed by: FAMILY MEDICINE

## 2025-06-03 PROCEDURE — G8417 CALC BMI ABV UP PARAM F/U: HCPCS | Performed by: FAMILY MEDICINE

## 2025-06-03 PROCEDURE — 3079F DIAST BP 80-89 MM HG: CPT | Performed by: FAMILY MEDICINE

## 2025-06-03 RX ORDER — PIOGLITAZONE 45 MG/1
45 TABLET ORAL DAILY
Qty: 90 TABLET | Refills: 1 | Status: SHIPPED | OUTPATIENT
Start: 2025-06-03

## 2025-06-03 RX ORDER — AVOBENZONE, HOMOSALATE, OCTISALATE, OCTOCRYLENE 30; 40; 45; 26 MG/ML; MG/ML; MG/ML; MG/ML
1 CREAM TOPICAL DAILY
Qty: 100 EACH | Refills: 3 | Status: SHIPPED | OUTPATIENT
Start: 2025-06-03

## 2025-06-03 RX ORDER — LOSARTAN POTASSIUM AND HYDROCHLOROTHIAZIDE 25; 100 MG/1; MG/1
1 TABLET ORAL DAILY
Qty: 90 TABLET | Refills: 1 | Status: SHIPPED | OUTPATIENT
Start: 2025-06-03

## 2025-06-03 RX ORDER — ROSUVASTATIN CALCIUM 10 MG/1
10 TABLET, COATED ORAL NIGHTLY
Qty: 90 TABLET | Refills: 1 | Status: SHIPPED | OUTPATIENT
Start: 2025-06-03

## 2025-06-03 SDOH — ECONOMIC STABILITY: FOOD INSECURITY: WITHIN THE PAST 12 MONTHS, YOU WORRIED THAT YOUR FOOD WOULD RUN OUT BEFORE YOU GOT MONEY TO BUY MORE.: NEVER TRUE

## 2025-06-03 SDOH — ECONOMIC STABILITY: FOOD INSECURITY: WITHIN THE PAST 12 MONTHS, THE FOOD YOU BOUGHT JUST DIDN'T LAST AND YOU DIDN'T HAVE MONEY TO GET MORE.: NEVER TRUE

## 2025-06-03 ASSESSMENT — PATIENT HEALTH QUESTIONNAIRE - PHQ9
1. LITTLE INTEREST OR PLEASURE IN DOING THINGS: NOT AT ALL
2. FEELING DOWN, DEPRESSED OR HOPELESS: NOT AT ALL
SUM OF ALL RESPONSES TO PHQ QUESTIONS 1-9: 0

## 2025-06-03 ASSESSMENT — ENCOUNTER SYMPTOMS
BACK PAIN: 1
ABDOMINAL PAIN: 0
TROUBLE SWALLOWING: 0
RHINORRHEA: 1
BLOOD IN STOOL: 0
SORE THROAT: 0
PHOTOPHOBIA: 0
WHEEZING: 0
CHEST TIGHTNESS: 0
DIARRHEA: 0
EYES NEGATIVE: 1
SHORTNESS OF BREATH: 0
EYE REDNESS: 0
CONSTIPATION: 0
COUGH: 0
COLOR CHANGE: 0
VOMITING: 0

## 2025-06-03 ASSESSMENT — LIFESTYLE VARIABLES
HOW MANY STANDARD DRINKS CONTAINING ALCOHOL DO YOU HAVE ON A TYPICAL DAY: 1 OR 2
HOW OFTEN DO YOU HAVE A DRINK CONTAINING ALCOHOL: 2-4 TIMES A MONTH

## 2025-06-03 NOTE — PROGRESS NOTES
OFFICE NOTE    6/3/25  Name: Katie Jiménez  :1945   Sex:female   Age:79 y.o.      SUBJECTIVE  Chief Complaint   Patient presents with    Medication Refill    Sleep Problem     Having a hard time staying asleep        History of Present Illness  The patient is a 79-year-old female who presents for evaluation of leg cramps, plantar fasciitis, diabetes, and subdural hematoma.    She reports a slight improvement in her leg cramps, which she has been managing with quinine tonic water. She has been using orthotics in her left shoe.    She has been under the care of Dr. Cortez, a podiatrist, who administered injections in her toe and heel to alleviate the pain from plantar fasciitis. These interventions provided temporary relief. She has been advised to wear [place] shoes instead of Skechers. She has been using Voltaren gel as needed, which provides some relief.    She has not undergone an ultrasound of her thyroid. She has a sebaceous cyst.    She has a history of lower back pain, which she attributes to previous surgeries involving screws and rods. She has undergone 3 back surgeries. She has been advised against taking aspirin due to a past brain bleed. She was previously on Advil, which was effective in managing her pain, but she has since switched to Tylenol. She is scheduled to see a rheumatologist tomorrow for an initial consultation.    She has been monitoring her blood sugar levels intermittently and reports that they have not been significantly elevated. She is currently on glipizide and pioglitazone for diabetes management.    She has been using hearing aids, which have been beneficial.    She experienced a fall that resulted in a broken elbow and a head injury.    PAST SURGICAL HISTORY:  She has undergone 3 back surgeries involving screws and rods.       Review of Systems   Constitutional:  Positive for activity change and fatigue. Negative for appetite change, fever and unexpected weight change.   HENT:

## 2025-06-04 LAB
ALBUMIN: 4.3 G/DL
ALP BLD-CCNC: 45 U/L
ALT SERPL-CCNC: 14 U/L
ANION GAP SERPL CALCULATED.3IONS-SCNC: NORMAL MMOL/L
AST SERPL-CCNC: 18 U/L
BASOPHILS ABSOLUTE: 41 /ΜL
BASOPHILS RELATIVE PERCENT: 1 %
BILIRUB SERPL-MCNC: 0.4 MG/DL (ref 0.1–1.4)
BUN BLDV-MCNC: 32 MG/DL
C-REACTIVE PROTEIN: <3
CALCIUM SERPL-MCNC: 9.6 MG/DL
CHLORIDE BLD-SCNC: 111 MMOL/L
CO2: 22 MMOL/L
CREAT SERPL-MCNC: 1.29 MG/DL
EOSINOPHILS ABSOLUTE: 127 /ΜL
EOSINOPHILS RELATIVE PERCENT: 3.1 %
GFR, ESTIMATED: 43
GLUCOSE BLD-MCNC: 92 MG/DL
HCT VFR BLD CALC: 33.6 % (ref 36–46)
HEMOGLOBIN: 10.7 G/DL (ref 12–16)
LYMPHOCYTES ABSOLUTE: 1054 /ΜL
LYMPHOCYTES RELATIVE PERCENT: 25.7 %
MCH RBC QN AUTO: 32.4 PG
MCHC RBC AUTO-ENTMCNC: 31.8 G/DL
MCV RBC AUTO: 101.8 FL
MONOCYTES ABSOLUTE: 349 /ΜL
MONOCYTES RELATIVE PERCENT: 8.5 %
NEUTROPHILS ABSOLUTE: 2530 /ΜL
NEUTROPHILS RELATIVE PERCENT: 61.7 %
PLATELET # BLD: 222 K/ΜL
PMV BLD AUTO: 9.3 FL
POTASSIUM SERPL-SCNC: 4.8 MMOL/L
RBC # BLD: 3.3 10^6/ΜL
SED RATE, AUTOMATED: 17
SODIUM BLD-SCNC: 141 MMOL/L
TOTAL PROTEIN: 6.5 G/DL (ref 6.4–8.2)
VITAMIN B-12: 689
WBC # BLD: 4.1 10^3/ML

## 2025-06-05 ENCOUNTER — RESULTS FOLLOW-UP (OUTPATIENT)
Dept: FAMILY MEDICINE CLINIC | Age: 80
End: 2025-06-05

## 2025-07-10 ENCOUNTER — OFFICE VISIT (OUTPATIENT)
Dept: FAMILY MEDICINE CLINIC | Age: 80
End: 2025-07-10
Payer: MEDICARE

## 2025-07-10 VITALS
RESPIRATION RATE: 14 BRPM | HEIGHT: 62 IN | WEIGHT: 155 LBS | BODY MASS INDEX: 28.52 KG/M2 | HEART RATE: 70 BPM | OXYGEN SATURATION: 97 % | SYSTOLIC BLOOD PRESSURE: 124 MMHG | TEMPERATURE: 97.1 F | DIASTOLIC BLOOD PRESSURE: 80 MMHG

## 2025-07-10 DIAGNOSIS — R01.1 MURMUR, CARDIAC: ICD-10-CM

## 2025-07-10 DIAGNOSIS — E11.22 TYPE 2 DIABETES MELLITUS WITH STAGE 3B CHRONIC KIDNEY DISEASE, WITHOUT LONG-TERM CURRENT USE OF INSULIN (HCC): ICD-10-CM

## 2025-07-10 DIAGNOSIS — Z01.818 PREOP EXAMINATION: Primary | ICD-10-CM

## 2025-07-10 DIAGNOSIS — M77.52 TENDINITIS OF LEFT FOOT: ICD-10-CM

## 2025-07-10 DIAGNOSIS — N18.32 TYPE 2 DIABETES MELLITUS WITH STAGE 3B CHRONIC KIDNEY DISEASE, WITHOUT LONG-TERM CURRENT USE OF INSULIN (HCC): ICD-10-CM

## 2025-07-10 PROCEDURE — 1090F PRES/ABSN URINE INCON ASSESS: CPT | Performed by: FAMILY MEDICINE

## 2025-07-10 PROCEDURE — 99213 OFFICE O/P EST LOW 20 MIN: CPT | Performed by: FAMILY MEDICINE

## 2025-07-10 PROCEDURE — 3051F HG A1C>EQUAL 7.0%<8.0%: CPT | Performed by: FAMILY MEDICINE

## 2025-07-10 PROCEDURE — 1036F TOBACCO NON-USER: CPT | Performed by: FAMILY MEDICINE

## 2025-07-10 PROCEDURE — G8399 PT W/DXA RESULTS DOCUMENT: HCPCS | Performed by: FAMILY MEDICINE

## 2025-07-10 PROCEDURE — 3079F DIAST BP 80-89 MM HG: CPT | Performed by: FAMILY MEDICINE

## 2025-07-10 PROCEDURE — 93000 ELECTROCARDIOGRAM COMPLETE: CPT | Performed by: FAMILY MEDICINE

## 2025-07-10 PROCEDURE — G8417 CALC BMI ABV UP PARAM F/U: HCPCS | Performed by: FAMILY MEDICINE

## 2025-07-10 PROCEDURE — 1159F MED LIST DOCD IN RCRD: CPT | Performed by: FAMILY MEDICINE

## 2025-07-10 PROCEDURE — G8427 DOCREV CUR MEDS BY ELIG CLIN: HCPCS | Performed by: FAMILY MEDICINE

## 2025-07-10 PROCEDURE — 3074F SYST BP LT 130 MM HG: CPT | Performed by: FAMILY MEDICINE

## 2025-07-10 PROCEDURE — 1123F ACP DISCUSS/DSCN MKR DOCD: CPT | Performed by: FAMILY MEDICINE

## 2025-07-10 ASSESSMENT — ENCOUNTER SYMPTOMS
TROUBLE SWALLOWING: 0
WHEEZING: 0
SORE THROAT: 0
DIARRHEA: 0
BLOOD IN STOOL: 0
CONSTIPATION: 0
ABDOMINAL PAIN: 0
COUGH: 0
COLOR CHANGE: 0
EYE REDNESS: 0
PHOTOPHOBIA: 0
SHORTNESS OF BREATH: 0

## 2025-07-10 NOTE — PROGRESS NOTES
procedure.  - Results of blood work from 06/2025 will be forwarded to the anesthesiologist.    2. Plantar fasciitis.  - Receiving injections from podiatrist to alleviate pain.  - Gabapentin was not effective.  - If current treatment does not provide sufficient relief, referral to orthopedic foot surgeon at [facility] will be considered.  - Advised to continue using orthotics and wearing [place] shoes.    3. Heart murmur.  - Faint murmur detected during examination, likely due to a slight leak in one of the valves causing turbulent blood flow.  - No immediate action required.  - Echocardiogram may be considered if symptoms worsen or further evaluation is needed.         No follow-ups on file.    Electronically signed by Ambrocio Cleannig MD on 7/10/25 at 1:34 PM EDT    Verbal consent obtained from patient to use [employer] software to assist in note creation..

## 2025-07-15 ENCOUNTER — TELEPHONE (OUTPATIENT)
Dept: FAMILY MEDICINE CLINIC | Age: 80
End: 2025-07-15

## 2025-07-15 NOTE — TELEPHONE ENCOUNTER
Received fax from Dr Lux's office for Katie that Dr Cleaning needs to fill out and send back, for her Pre Op from the other day.    She said she talked to their office multiples times and they said they have faxed us the form at least 3 times.    I did find one in Dr Cleaning mailbox that was printed earlier, so patient is asking that we please fill it out and then call her once we've faxed it back so she knows it has been done. She is wanted to get these shots before she goes out of town in the next 2 weeks.    She can be reached at: [phone], & the clearance form is in 's mailbox up front.

## 2025-08-15 ENCOUNTER — OFFICE VISIT (OUTPATIENT)
Dept: FAMILY MEDICINE CLINIC | Age: 80
End: 2025-08-15
Payer: MEDICARE

## 2025-08-15 VITALS
TEMPERATURE: 97.6 F | WEIGHT: 152 LBS | BODY MASS INDEX: 27.97 KG/M2 | HEIGHT: 62 IN | OXYGEN SATURATION: 97 % | HEART RATE: 58 BPM | SYSTOLIC BLOOD PRESSURE: 136 MMHG | DIASTOLIC BLOOD PRESSURE: 62 MMHG

## 2025-08-15 DIAGNOSIS — E11.22 TYPE 2 DIABETES MELLITUS WITH STAGE 3B CHRONIC KIDNEY DISEASE, WITHOUT LONG-TERM CURRENT USE OF INSULIN (HCC): ICD-10-CM

## 2025-08-15 DIAGNOSIS — R94.4 DECREASED CREATININE CLEARANCE: ICD-10-CM

## 2025-08-15 DIAGNOSIS — B02.9 HERPES ZOSTER WITHOUT COMPLICATION: Primary | ICD-10-CM

## 2025-08-15 DIAGNOSIS — N18.32 TYPE 2 DIABETES MELLITUS WITH STAGE 3B CHRONIC KIDNEY DISEASE, WITHOUT LONG-TERM CURRENT USE OF INSULIN (HCC): ICD-10-CM

## 2025-08-15 PROCEDURE — 3051F HG A1C>EQUAL 7.0%<8.0%: CPT | Performed by: PHYSICIAN ASSISTANT

## 2025-08-15 PROCEDURE — 3078F DIAST BP <80 MM HG: CPT | Performed by: PHYSICIAN ASSISTANT

## 2025-08-15 PROCEDURE — 3075F SYST BP GE 130 - 139MM HG: CPT | Performed by: PHYSICIAN ASSISTANT

## 2025-08-15 PROCEDURE — G8417 CALC BMI ABV UP PARAM F/U: HCPCS | Performed by: PHYSICIAN ASSISTANT

## 2025-08-15 PROCEDURE — 1123F ACP DISCUSS/DSCN MKR DOCD: CPT | Performed by: PHYSICIAN ASSISTANT

## 2025-08-15 PROCEDURE — G8399 PT W/DXA RESULTS DOCUMENT: HCPCS | Performed by: PHYSICIAN ASSISTANT

## 2025-08-15 PROCEDURE — 99204 OFFICE O/P NEW MOD 45 MIN: CPT | Performed by: PHYSICIAN ASSISTANT

## 2025-08-15 PROCEDURE — 1159F MED LIST DOCD IN RCRD: CPT | Performed by: PHYSICIAN ASSISTANT

## 2025-08-15 PROCEDURE — G8427 DOCREV CUR MEDS BY ELIG CLIN: HCPCS | Performed by: PHYSICIAN ASSISTANT

## 2025-08-15 PROCEDURE — 1160F RVW MEDS BY RX/DR IN RCRD: CPT | Performed by: PHYSICIAN ASSISTANT

## 2025-08-15 PROCEDURE — 1036F TOBACCO NON-USER: CPT | Performed by: PHYSICIAN ASSISTANT

## 2025-08-15 PROCEDURE — 1090F PRES/ABSN URINE INCON ASSESS: CPT | Performed by: PHYSICIAN ASSISTANT

## 2025-08-15 RX ORDER — METHYLPREDNISOLONE 4 MG/1
TABLET ORAL
Qty: 1 KIT | Refills: 0 | Status: SHIPPED | OUTPATIENT
Start: 2025-08-15 | End: 2025-08-21

## 2025-08-15 RX ORDER — VALACYCLOVIR HYDROCHLORIDE 1 G/1
1000 TABLET, FILM COATED ORAL 3 TIMES DAILY
Qty: 21 TABLET | Refills: 0 | Status: CANCELLED | OUTPATIENT
Start: 2025-08-15 | End: 2025-08-22

## 2025-08-15 RX ORDER — VALACYCLOVIR HYDROCHLORIDE 1 G/1
1000 TABLET, FILM COATED ORAL 2 TIMES DAILY
Qty: 14 TABLET | Refills: 0 | Status: SHIPPED | OUTPATIENT
Start: 2025-08-15 | End: 2025-08-22

## (undated) DEVICE — GLOVE ORTHO 8   MSG9480

## (undated) DEVICE — BNDG,ELSTC,MATRIX,STRL,4"X5YD,LF,HOOK&LP: Brand: MEDLINE

## (undated) DEVICE — PACK,SHOULDER II,SIRUS: Brand: MEDLINE

## (undated) DEVICE — PADDING,UNDERCAST,COTTON, 4"X4YD STERILE: Brand: MEDLINE

## (undated) DEVICE — ELECTRODE PT RET AD L9FT HI MOIST COND ADH HYDRGEL CORDED

## (undated) DEVICE — BIT DRL L140MM DIA2MM QUIK CPL 3 FLUT CALIB DEPTH MRK W/O

## (undated) DEVICE — 4-PORT MANIFOLD: Brand: NEPTUNE 2

## (undated) DEVICE — GLOVE ORANGE PI 8   MSG9080

## (undated) DEVICE — BIT DRL L110MM DIA2.5MM ST G QUIK CPL NONRADIOPAQUE W/O STP

## (undated) DEVICE — PADDING,UNDERCAST,COTTON, 3X4YD STERILE: Brand: MEDLINE

## (undated) DEVICE — SLING ARM L L165IN D75IN WHT POLY MESH ENVELOP MTL SIDE

## (undated) DEVICE — 3M™ IOBAN™ 2 ANTIMICROBIAL INCISE DRAPE 6640EZ: Brand: IOBAN™ 2

## (undated) DEVICE — BNDG,ELSTC,MATRIX,STRL,3"X5YD,LF,HOOK&LP: Brand: MEDLINE

## (undated) DEVICE — UPPER EXTREMITY: Brand: MEDLINE INDUSTRIES, INC.